# Patient Record
Sex: MALE | Race: WHITE | Employment: OTHER | URBAN - METROPOLITAN AREA
[De-identification: names, ages, dates, MRNs, and addresses within clinical notes are randomized per-mention and may not be internally consistent; named-entity substitution may affect disease eponyms.]

---

## 2017-07-31 ENCOUNTER — HOSPITAL ENCOUNTER (OUTPATIENT)
Dept: ULTRASOUND IMAGING | Age: 47
Discharge: HOME | End: 2017-07-31
Attending: NURSE PRACTITIONER | Admitting: NURSE PRACTITIONER

## 2017-07-31 DIAGNOSIS — R74.01 NONSPECIFIC ELEVATION OF LEVELS OF TRANSAMINASE AND LACTIC ACID DEHYDROGENASE (LDH): ICD-10-CM

## 2017-07-31 DIAGNOSIS — R74.02 NONSPECIFIC ELEVATION OF LEVELS OF TRANSAMINASE AND LACTIC ACID DEHYDROGENASE (LDH): ICD-10-CM

## 2017-07-31 DIAGNOSIS — R74.01 ELEVATED ALT MEASUREMENT: ICD-10-CM

## 2018-03-20 PROBLEM — R07.9 CHEST PAIN: Status: ACTIVE | Noted: 2018-03-20

## 2018-03-23 RX ORDER — CLOPIDOGREL BISULFATE 75 MG/1
75 TABLET ORAL DAILY
Qty: 30 TABLET | Refills: 3 | Status: ON HOLD | OUTPATIENT
Start: 2018-03-23 | End: 2018-08-15 | Stop reason: HOSPADM

## 2018-03-23 RX ORDER — ATORVASTATIN CALCIUM 80 MG/1
80 TABLET, FILM COATED ORAL NIGHTLY
Qty: 30 TABLET | Refills: 3 | Status: SHIPPED | OUTPATIENT
Start: 2018-03-23 | End: 2018-07-18 | Stop reason: SDUPTHER

## 2018-03-23 RX ORDER — ASPIRIN 81 MG/1
81 TABLET ORAL DAILY
Qty: 30 TABLET | Refills: 3 | Status: ON HOLD | OUTPATIENT
Start: 2018-03-23 | End: 2018-10-08

## 2018-03-23 RX ORDER — NITROGLYCERIN 0.4 MG/1
TABLET SUBLINGUAL
Qty: 25 TABLET | Refills: 3 | Status: ON HOLD | OUTPATIENT
Start: 2018-03-23 | End: 2018-08-24 | Stop reason: HOSPADM

## 2018-03-23 RX ORDER — METOPROLOL SUCCINATE 25 MG/1
12.5 TABLET, EXTENDED RELEASE ORAL DAILY
Qty: 30 TABLET | Refills: 3 | Status: SHIPPED | OUTPATIENT
Start: 2018-03-23 | End: 2018-06-18

## 2018-04-02 ENCOUNTER — TELEPHONE (OUTPATIENT)
Dept: CARDIOLOGY CLINIC | Age: 48
End: 2018-04-02

## 2018-04-13 ENCOUNTER — OFFICE VISIT (OUTPATIENT)
Dept: CARDIOLOGY CLINIC | Age: 48
End: 2018-04-13

## 2018-04-13 VITALS
WEIGHT: 224 LBS | OXYGEN SATURATION: 96 % | HEART RATE: 72 BPM | BODY MASS INDEX: 30.38 KG/M2 | DIASTOLIC BLOOD PRESSURE: 76 MMHG | SYSTOLIC BLOOD PRESSURE: 120 MMHG

## 2018-04-13 DIAGNOSIS — I10 ESSENTIAL HYPERTENSION: ICD-10-CM

## 2018-04-13 DIAGNOSIS — I25.118 CORONARY ARTERY DISEASE OF NATIVE ARTERY OF NATIVE HEART WITH STABLE ANGINA PECTORIS (HCC): ICD-10-CM

## 2018-04-13 DIAGNOSIS — E78.2 MIXED HYPERLIPIDEMIA: ICD-10-CM

## 2018-04-13 DIAGNOSIS — I25.10 CHRONIC TOTAL OCCLUSION OF NATIVE CORONARY ARTERY: ICD-10-CM

## 2018-04-13 DIAGNOSIS — I25.82 CHRONIC TOTAL OCCLUSION OF NATIVE CORONARY ARTERY: ICD-10-CM

## 2018-04-13 DIAGNOSIS — R93.1 ABNORMAL NUCLEAR CARDIAC IMAGING TEST: ICD-10-CM

## 2018-04-13 PROCEDURE — G8417 CALC BMI ABV UP PARAM F/U: HCPCS | Performed by: INTERNAL MEDICINE

## 2018-04-13 PROCEDURE — G8427 DOCREV CUR MEDS BY ELIG CLIN: HCPCS | Performed by: INTERNAL MEDICINE

## 2018-04-13 PROCEDURE — 99215 OFFICE O/P EST HI 40 MIN: CPT | Performed by: INTERNAL MEDICINE

## 2018-04-13 RX ORDER — ISOSORBIDE MONONITRATE 30 MG/1
30 TABLET, EXTENDED RELEASE ORAL DAILY
Qty: 30 TABLET | Refills: 3 | Status: ON HOLD | OUTPATIENT
Start: 2018-04-13 | End: 2018-06-11 | Stop reason: HOSPADM

## 2018-04-16 ENCOUNTER — TELEPHONE (OUTPATIENT)
Dept: CARDIOLOGY CLINIC | Age: 48
End: 2018-04-16

## 2018-04-26 ENCOUNTER — TELEPHONE (OUTPATIENT)
Dept: CARDIOLOGY CLINIC | Age: 48
End: 2018-04-26

## 2018-05-02 ENCOUNTER — TELEPHONE (OUTPATIENT)
Dept: CARDIOLOGY CLINIC | Age: 48
End: 2018-05-02

## 2018-05-30 ENCOUNTER — TELEPHONE (OUTPATIENT)
Dept: CARDIOLOGY CLINIC | Age: 48
End: 2018-05-30

## 2018-06-28 ENCOUNTER — TELEPHONE (OUTPATIENT)
Dept: CARDIOLOGY CLINIC | Age: 48
End: 2018-06-28

## 2018-06-28 ENCOUNTER — OFFICE VISIT (OUTPATIENT)
Dept: CARDIOLOGY CLINIC | Age: 48
End: 2018-06-28

## 2018-06-28 ENCOUNTER — HOSPITAL ENCOUNTER (OUTPATIENT)
Dept: OTHER | Age: 48
Discharge: HOME | End: 2018-06-28
Attending: INTERNAL MEDICINE | Admitting: INTERNAL MEDICINE

## 2018-06-28 VITALS
HEIGHT: 72 IN | HEART RATE: 84 BPM | SYSTOLIC BLOOD PRESSURE: 108 MMHG | DIASTOLIC BLOOD PRESSURE: 60 MMHG | OXYGEN SATURATION: 96 % | WEIGHT: 206 LBS | BODY MASS INDEX: 27.9 KG/M2

## 2018-06-28 DIAGNOSIS — I25.82 CHRONIC TOTAL OCCLUSION OF NATIVE CORONARY ARTERY: ICD-10-CM

## 2018-06-28 DIAGNOSIS — R06.09 DOE (DYSPNEA ON EXERTION): ICD-10-CM

## 2018-06-28 DIAGNOSIS — E78.2 MIXED HYPERLIPIDEMIA: ICD-10-CM

## 2018-06-28 DIAGNOSIS — I10 ESSENTIAL HYPERTENSION: ICD-10-CM

## 2018-06-28 DIAGNOSIS — I25.10 CHRONIC TOTAL OCCLUSION OF NATIVE CORONARY ARTERY: ICD-10-CM

## 2018-06-28 DIAGNOSIS — I20.9 ANGINA, CLASS IV (HCC): Primary | ICD-10-CM

## 2018-06-28 DIAGNOSIS — I25.118 CORONARY ARTERY DISEASE OF NATIVE ARTERY OF NATIVE HEART WITH STABLE ANGINA PECTORIS (HCC): ICD-10-CM

## 2018-06-28 LAB — PRO-BNP: 52 PG/ML (ref 0–124)

## 2018-06-28 PROCEDURE — 4004F PT TOBACCO SCREEN RCVD TLK: CPT | Performed by: INTERNAL MEDICINE

## 2018-06-28 PROCEDURE — G8598 ASA/ANTIPLAT THER USED: HCPCS | Performed by: INTERNAL MEDICINE

## 2018-06-28 PROCEDURE — G8417 CALC BMI ABV UP PARAM F/U: HCPCS | Performed by: INTERNAL MEDICINE

## 2018-06-28 PROCEDURE — G8427 DOCREV CUR MEDS BY ELIG CLIN: HCPCS | Performed by: INTERNAL MEDICINE

## 2018-06-28 PROCEDURE — 1111F DSCHRG MED/CURRENT MED MERGE: CPT | Performed by: INTERNAL MEDICINE

## 2018-06-28 PROCEDURE — 99214 OFFICE O/P EST MOD 30 MIN: CPT | Performed by: INTERNAL MEDICINE

## 2018-06-28 RX ORDER — RANOLAZINE 1000 MG/1
1000 TABLET, EXTENDED RELEASE ORAL 2 TIMES DAILY
Qty: 60 TABLET | Refills: 6 | Status: ON HOLD | OUTPATIENT
Start: 2018-06-28 | End: 2018-08-24 | Stop reason: HOSPADM

## 2018-07-03 ENCOUNTER — TELEPHONE (OUTPATIENT)
Dept: CARDIOLOGY CLINIC | Age: 48
End: 2018-07-03

## 2018-07-18 RX ORDER — ATORVASTATIN CALCIUM 80 MG/1
80 TABLET, FILM COATED ORAL NIGHTLY
Qty: 90 TABLET | Refills: 3 | Status: ON HOLD | OUTPATIENT
Start: 2018-07-18 | End: 2018-08-24 | Stop reason: HOSPADM

## 2018-07-23 ENCOUNTER — APPOINTMENT (OUTPATIENT)
Dept: CT IMAGING | Age: 48
DRG: 682 | End: 2018-07-23
Payer: MEDICARE

## 2018-07-23 ENCOUNTER — APPOINTMENT (OUTPATIENT)
Dept: GENERAL RADIOLOGY | Age: 48
DRG: 682 | End: 2018-07-23
Payer: MEDICARE

## 2018-07-23 ENCOUNTER — HOSPITAL ENCOUNTER (INPATIENT)
Age: 48
LOS: 5 days | Discharge: NURSING, REHAB OR LONG-TERM CARE | DRG: 682 | End: 2018-07-28
Attending: EMERGENCY MEDICINE | Admitting: INTERNAL MEDICINE
Payer: MEDICARE

## 2018-07-23 DIAGNOSIS — R11.2 NAUSEA AND VOMITING, INTRACTABILITY OF VOMITING NOT SPECIFIED, UNSPECIFIED VOMITING TYPE: ICD-10-CM

## 2018-07-23 DIAGNOSIS — N17.9 ACUTE RENAL FAILURE, UNSPECIFIED ACUTE RENAL FAILURE TYPE (HCC): Primary | ICD-10-CM

## 2018-07-23 DIAGNOSIS — R80.9 PROTEINURIA, UNSPECIFIED TYPE: ICD-10-CM

## 2018-07-23 DIAGNOSIS — D72.829 LEUKOCYTOSIS, UNSPECIFIED TYPE: ICD-10-CM

## 2018-07-23 PROBLEM — E44.0 MODERATE PROTEIN-CALORIE MALNUTRITION (HCC): Status: ACTIVE | Noted: 2018-07-23

## 2018-07-23 LAB
A/G RATIO: 1.4 (ref 1.1–2.2)
A/G RATIO: 1.6 (ref 1.1–2.2)
ACETAMINOPHEN LEVEL: <5 UG/ML (ref 10–30)
ALBUMIN SERPL-MCNC: 3.6 G/DL (ref 3.4–5)
ALBUMIN SERPL-MCNC: 3.6 G/DL (ref 3.4–5)
ALBUMIN SERPL-MCNC: 3.7 G/DL (ref 3.4–5)
ALBUMIN SERPL-MCNC: 4.4 G/DL (ref 3.4–5)
ALP BLD-CCNC: 82 U/L (ref 40–129)
ALP BLD-CCNC: 90 U/L (ref 40–129)
ALT SERPL-CCNC: 13 U/L (ref 10–40)
ALT SERPL-CCNC: 15 U/L (ref 10–40)
AMORPHOUS: ABNORMAL /HPF
ANION GAP SERPL CALCULATED.3IONS-SCNC: 11 MMOL/L (ref 3–16)
ANION GAP SERPL CALCULATED.3IONS-SCNC: 13 MMOL/L (ref 3–16)
ANION GAP SERPL CALCULATED.3IONS-SCNC: 13 MMOL/L (ref 3–16)
ANION GAP SERPL CALCULATED.3IONS-SCNC: 14 MMOL/L (ref 3–16)
AST SERPL-CCNC: 21 U/L (ref 15–37)
AST SERPL-CCNC: 24 U/L (ref 15–37)
BACTERIA: ABNORMAL /HPF
BASOPHILS ABSOLUTE: 0 K/UL (ref 0–0.2)
BASOPHILS ABSOLUTE: 0 K/UL (ref 0–0.2)
BASOPHILS RELATIVE PERCENT: 0.2 %
BASOPHILS RELATIVE PERCENT: 0.3 %
BILIRUB SERPL-MCNC: 0.6 MG/DL (ref 0–1)
BILIRUB SERPL-MCNC: 0.7 MG/DL (ref 0–1)
BILIRUBIN URINE: ABNORMAL
BLOOD, URINE: NEGATIVE
BUN BLDV-MCNC: 13 MG/DL (ref 7–20)
BUN BLDV-MCNC: 50 MG/DL (ref 7–20)
BUN BLDV-MCNC: 51 MG/DL (ref 7–20)
BUN BLDV-MCNC: 52 MG/DL (ref 7–20)
CALCIUM SERPL-MCNC: 10.6 MG/DL (ref 8.3–10.6)
CALCIUM SERPL-MCNC: 8.3 MG/DL (ref 8.3–10.6)
CALCIUM SERPL-MCNC: 9.2 MG/DL (ref 8.3–10.6)
CALCIUM SERPL-MCNC: 9.9 MG/DL (ref 8.3–10.6)
CASTS: ABNORMAL /LPF
CHLORIDE BLD-SCNC: 101 MMOL/L (ref 99–110)
CHLORIDE BLD-SCNC: 104 MMOL/L (ref 99–110)
CHLORIDE BLD-SCNC: 97 MMOL/L (ref 99–110)
CHLORIDE BLD-SCNC: 99 MMOL/L (ref 99–110)
CLARITY: CLEAR
CO2: 19 MMOL/L (ref 21–32)
CO2: 21 MMOL/L (ref 21–32)
CO2: 22 MMOL/L (ref 21–32)
CO2: 23 MMOL/L (ref 21–32)
COLOR: ABNORMAL
CREAT SERPL-MCNC: 1.2 MG/DL (ref 0.9–1.3)
CREAT SERPL-MCNC: 3.8 MG/DL (ref 0.9–1.3)
CREAT SERPL-MCNC: 3.8 MG/DL (ref 0.9–1.3)
CREAT SERPL-MCNC: 3.9 MG/DL (ref 0.9–1.3)
EKG ATRIAL RATE: 62 BPM
EKG ATRIAL RATE: 70 BPM
EKG DIAGNOSIS: NORMAL
EKG DIAGNOSIS: NORMAL
EKG P AXIS: 58 DEGREES
EKG P AXIS: 61 DEGREES
EKG P-R INTERVAL: 164 MS
EKG P-R INTERVAL: 170 MS
EKG Q-T INTERVAL: 434 MS
EKG Q-T INTERVAL: 448 MS
EKG QRS DURATION: 108 MS
EKG QRS DURATION: 112 MS
EKG QTC CALCULATION (BAZETT): 440 MS
EKG QTC CALCULATION (BAZETT): 483 MS
EKG R AXIS: 44 DEGREES
EKG R AXIS: 58 DEGREES
EKG T AXIS: 62 DEGREES
EKG T AXIS: 67 DEGREES
EKG VENTRICULAR RATE: 62 BPM
EKG VENTRICULAR RATE: 70 BPM
EOSINOPHILS ABSOLUTE: 0.3 K/UL (ref 0–0.6)
EOSINOPHILS ABSOLUTE: 0.3 K/UL (ref 0–0.6)
EOSINOPHILS RELATIVE PERCENT: 1.9 %
EOSINOPHILS RELATIVE PERCENT: 2.1 %
EPITHELIAL CELLS, UA: ABNORMAL /HPF
ESTIMATED AVERAGE GLUCOSE: 99.7 MG/DL
GFR AFRICAN AMERICAN: 20
GFR AFRICAN AMERICAN: 21
GFR AFRICAN AMERICAN: 21
GFR AFRICAN AMERICAN: >60
GFR NON-AFRICAN AMERICAN: 17
GFR NON-AFRICAN AMERICAN: >60
GLOBULIN: 2.6 G/DL
GLOBULIN: 2.8 G/DL
GLUCOSE BLD-MCNC: 101 MG/DL (ref 70–99)
GLUCOSE BLD-MCNC: 103 MG/DL (ref 70–99)
GLUCOSE BLD-MCNC: 106 MG/DL (ref 70–99)
GLUCOSE BLD-MCNC: 75 MG/DL (ref 70–99)
GLUCOSE BLD-MCNC: 82 MG/DL (ref 70–99)
GLUCOSE BLD-MCNC: 84 MG/DL (ref 70–99)
GLUCOSE BLD-MCNC: 88 MG/DL (ref 70–99)
GLUCOSE BLD-MCNC: 93 MG/DL (ref 70–99)
GLUCOSE BLD-MCNC: 93 MG/DL (ref 70–99)
GLUCOSE BLD-MCNC: 95 MG/DL (ref 70–99)
GLUCOSE URINE: NEGATIVE MG/DL
HBA1C MFR BLD: 5.1 %
HBV SURFACE AB TITR SER: <3.5 MIU/ML
HCT VFR BLD CALC: 36.9 % (ref 40.5–52.5)
HCT VFR BLD CALC: 39.9 % (ref 40.5–52.5)
HEMOGLOBIN: 12.6 G/DL (ref 13.5–17.5)
HEMOGLOBIN: 13.7 G/DL (ref 13.5–17.5)
HEPATITIS B SURFACE ANTIGEN INTERPRETATION: NORMAL
INR BLD: 1.16 (ref 0.86–1.14)
KETONES, URINE: NEGATIVE MG/DL
LEUKOCYTE ESTERASE, URINE: NEGATIVE
LIPASE: 65 U/L (ref 13–60)
LITHIUM DOSE AMOUNT: ABNORMAL
LITHIUM DOSE AMOUNT: NORMAL
LITHIUM DOSE AMOUNT: NORMAL
LITHIUM LEVEL: 0.9 MMOL/L (ref 0.6–1.2)
LITHIUM LEVEL: 1.1 MMOL/L (ref 0.6–1.2)
LITHIUM LEVEL: 2.4 MMOL/L (ref 0.6–1.2)
LITHIUM LEVEL: 2.5 MMOL/L (ref 0.6–1.2)
LITHIUM LEVEL: 2.8 MMOL/L (ref 0.6–1.2)
LITHIUM LEVEL: 3 MMOL/L (ref 0.6–1.2)
LYMPHOCYTES ABSOLUTE: 2 K/UL (ref 1–5.1)
LYMPHOCYTES ABSOLUTE: 2.2 K/UL (ref 1–5.1)
LYMPHOCYTES RELATIVE PERCENT: 14.2 %
LYMPHOCYTES RELATIVE PERCENT: 15.8 %
MCH RBC QN AUTO: 28.9 PG (ref 26–34)
MCH RBC QN AUTO: 29.2 PG (ref 26–34)
MCHC RBC AUTO-ENTMCNC: 34.1 G/DL (ref 31–36)
MCHC RBC AUTO-ENTMCNC: 34.4 G/DL (ref 31–36)
MCV RBC AUTO: 84.9 FL (ref 80–100)
MCV RBC AUTO: 85 FL (ref 80–100)
MICROSCOPIC EXAMINATION: YES
MONOCYTES ABSOLUTE: 0.9 K/UL (ref 0–1.3)
MONOCYTES ABSOLUTE: 1 K/UL (ref 0–1.3)
MONOCYTES RELATIVE PERCENT: 6.4 %
MONOCYTES RELATIVE PERCENT: 6.6 %
NEUTROPHILS ABSOLUTE: 11.8 K/UL (ref 1.7–7.7)
NEUTROPHILS ABSOLUTE: 9.7 K/UL (ref 1.7–7.7)
NEUTROPHILS RELATIVE PERCENT: 75.2 %
NEUTROPHILS RELATIVE PERCENT: 77.3 %
NITRITE, URINE: NEGATIVE
PDW BLD-RTO: 13.4 % (ref 12.4–15.4)
PDW BLD-RTO: 13.7 % (ref 12.4–15.4)
PERFORMED ON: ABNORMAL
PERFORMED ON: NORMAL
PH UA: 5.5
PHOSPHORUS: 2 MG/DL (ref 2.5–4.9)
PHOSPHORUS: 4.7 MG/DL (ref 2.5–4.9)
PLATELET # BLD: 226 K/UL (ref 135–450)
PLATELET # BLD: 251 K/UL (ref 135–450)
PMV BLD AUTO: 8.9 FL (ref 5–10.5)
PMV BLD AUTO: 9.6 FL (ref 5–10.5)
POTASSIUM REFLEX MAGNESIUM: 4.9 MMOL/L (ref 3.5–5.1)
POTASSIUM REFLEX MAGNESIUM: 5 MMOL/L (ref 3.5–5.1)
POTASSIUM SERPL-SCNC: 3.6 MMOL/L (ref 3.5–5.1)
POTASSIUM SERPL-SCNC: 4.4 MMOL/L (ref 3.5–5.1)
PROTEIN UA: 30 MG/DL
PROTHROMBIN TIME: 13.2 SEC (ref 9.8–13)
RBC # BLD: 4.35 M/UL (ref 4.2–5.9)
RBC # BLD: 4.7 M/UL (ref 4.2–5.9)
RBC UA: ABNORMAL /HPF (ref 0–2)
SALICYLATE, SERUM: 0.9 MG/DL (ref 15–30)
SODIUM BLD-SCNC: 131 MMOL/L (ref 136–145)
SODIUM BLD-SCNC: 134 MMOL/L (ref 136–145)
SODIUM BLD-SCNC: 135 MMOL/L (ref 136–145)
SODIUM BLD-SCNC: 137 MMOL/L (ref 136–145)
SPECIFIC GRAVITY UA: >=1.03
TOTAL PROTEIN: 6.3 G/DL (ref 6.4–8.2)
TOTAL PROTEIN: 7.2 G/DL (ref 6.4–8.2)
TROPONIN: <0.01 NG/ML
TSH REFLEX: 0.33 UIU/ML (ref 0.27–4.2)
URINE TYPE: ABNORMAL
UROBILINOGEN, URINE: 0.2 E.U./DL
WBC # BLD: 12.9 K/UL (ref 4–11)
WBC # BLD: 15.3 K/UL (ref 4–11)
WBC UA: ABNORMAL /HPF (ref 0–5)

## 2018-07-23 PROCEDURE — S0028 INJECTION, FAMOTIDINE, 20 MG: HCPCS | Performed by: EMERGENCY MEDICINE

## 2018-07-23 PROCEDURE — 85610 PROTHROMBIN TIME: CPT

## 2018-07-23 PROCEDURE — 86704 HEP B CORE ANTIBODY TOTAL: CPT

## 2018-07-23 PROCEDURE — 2580000003 HC RX 258: Performed by: INTERNAL MEDICINE

## 2018-07-23 PROCEDURE — 6370000000 HC RX 637 (ALT 250 FOR IP): Performed by: INTERNAL MEDICINE

## 2018-07-23 PROCEDURE — 2500000003 HC RX 250 WO HCPCS: Performed by: EMERGENCY MEDICINE

## 2018-07-23 PROCEDURE — 93010 ELECTROCARDIOGRAM REPORT: CPT | Performed by: INTERNAL MEDICINE

## 2018-07-23 PROCEDURE — 51702 INSERT TEMP BLADDER CATH: CPT

## 2018-07-23 PROCEDURE — 36415 COLL VENOUS BLD VENIPUNCTURE: CPT

## 2018-07-23 PROCEDURE — 2580000003 HC RX 258: Performed by: EMERGENCY MEDICINE

## 2018-07-23 PROCEDURE — 51798 US URINE CAPACITY MEASURE: CPT

## 2018-07-23 PROCEDURE — 2000000000 HC ICU R&B

## 2018-07-23 PROCEDURE — 36556 INSERT NON-TUNNEL CV CATH: CPT

## 2018-07-23 PROCEDURE — 05HM33Z INSERTION OF INFUSION DEVICE INTO RIGHT INTERNAL JUGULAR VEIN, PERCUTANEOUS APPROACH: ICD-10-PCS | Performed by: RADIOLOGY

## 2018-07-23 PROCEDURE — 99285 EMERGENCY DEPT VISIT HI MDM: CPT

## 2018-07-23 PROCEDURE — G0480 DRUG TEST DEF 1-7 CLASSES: HCPCS

## 2018-07-23 PROCEDURE — 80053 COMPREHEN METABOLIC PANEL: CPT

## 2018-07-23 PROCEDURE — 93005 ELECTROCARDIOGRAM TRACING: CPT | Performed by: EMERGENCY MEDICINE

## 2018-07-23 PROCEDURE — 76937 US GUIDE VASCULAR ACCESS: CPT

## 2018-07-23 PROCEDURE — 96374 THER/PROPH/DIAG INJ IV PUSH: CPT

## 2018-07-23 PROCEDURE — 80178 ASSAY OF LITHIUM: CPT

## 2018-07-23 PROCEDURE — C9113 INJ PANTOPRAZOLE SODIUM, VIA: HCPCS | Performed by: INTERNAL MEDICINE

## 2018-07-23 PROCEDURE — 5A1D70Z PERFORMANCE OF URINARY FILTRATION, INTERMITTENT, LESS THAN 6 HOURS PER DAY: ICD-10-PCS | Performed by: INTERNAL MEDICINE

## 2018-07-23 PROCEDURE — 71045 X-RAY EXAM CHEST 1 VIEW: CPT

## 2018-07-23 PROCEDURE — 99222 1ST HOSP IP/OBS MODERATE 55: CPT | Performed by: INTERNAL MEDICINE

## 2018-07-23 PROCEDURE — C1752 CATH,HEMODIALYSIS,SHORT-TERM: HCPCS

## 2018-07-23 PROCEDURE — 96361 HYDRATE IV INFUSION ADD-ON: CPT

## 2018-07-23 PROCEDURE — 87340 HEPATITIS B SURFACE AG IA: CPT

## 2018-07-23 PROCEDURE — 85025 COMPLETE CBC W/AUTO DIFF WBC: CPT

## 2018-07-23 PROCEDURE — 83690 ASSAY OF LIPASE: CPT

## 2018-07-23 PROCEDURE — 2500000003 HC RX 250 WO HCPCS: Performed by: INTERNAL MEDICINE

## 2018-07-23 PROCEDURE — 6360000002 HC RX W HCPCS: Performed by: INTERNAL MEDICINE

## 2018-07-23 PROCEDURE — 81001 URINALYSIS AUTO W/SCOPE: CPT

## 2018-07-23 PROCEDURE — 74176 CT ABD & PELVIS W/O CONTRAST: CPT

## 2018-07-23 PROCEDURE — 86706 HEP B SURFACE ANTIBODY: CPT

## 2018-07-23 PROCEDURE — 83036 HEMOGLOBIN GLYCOSYLATED A1C: CPT

## 2018-07-23 PROCEDURE — 77001 FLUOROGUIDE FOR VEIN DEVICE: CPT

## 2018-07-23 PROCEDURE — 84443 ASSAY THYROID STIM HORMONE: CPT

## 2018-07-23 PROCEDURE — 96375 TX/PRO/DX INJ NEW DRUG ADDON: CPT

## 2018-07-23 PROCEDURE — 6370000000 HC RX 637 (ALT 250 FOR IP): Performed by: NURSE PRACTITIONER

## 2018-07-23 PROCEDURE — 99223 1ST HOSP IP/OBS HIGH 75: CPT | Performed by: NURSE PRACTITIONER

## 2018-07-23 PROCEDURE — 84484 ASSAY OF TROPONIN QUANT: CPT

## 2018-07-23 PROCEDURE — 90937 HEMODIALYSIS REPEATED EVAL: CPT

## 2018-07-23 PROCEDURE — 93005 ELECTROCARDIOGRAM TRACING: CPT | Performed by: INTERNAL MEDICINE

## 2018-07-23 PROCEDURE — 6360000002 HC RX W HCPCS: Performed by: EMERGENCY MEDICINE

## 2018-07-23 RX ORDER — HEPARIN SODIUM 5000 [USP'U]/ML
5000 INJECTION, SOLUTION INTRAVENOUS; SUBCUTANEOUS EVERY 8 HOURS SCHEDULED
Status: DISCONTINUED | OUTPATIENT
Start: 2018-07-23 | End: 2018-07-24

## 2018-07-23 RX ORDER — NITROGLYCERIN 0.4 MG/1
0.4 TABLET SUBLINGUAL EVERY 5 MIN PRN
Status: DISCONTINUED | OUTPATIENT
Start: 2018-07-23 | End: 2018-07-28 | Stop reason: HOSPADM

## 2018-07-23 RX ORDER — NICOTINE POLACRILEX 4 MG
15 LOZENGE BUCCAL PRN
Status: DISCONTINUED | OUTPATIENT
Start: 2018-07-23 | End: 2018-07-28 | Stop reason: HOSPADM

## 2018-07-23 RX ORDER — CLOPIDOGREL BISULFATE 75 MG/1
75 TABLET ORAL DAILY
Status: DISCONTINUED | OUTPATIENT
Start: 2018-07-23 | End: 2018-07-24

## 2018-07-23 RX ORDER — ONDANSETRON 2 MG/ML
4 INJECTION INTRAMUSCULAR; INTRAVENOUS EVERY 6 HOURS PRN
Status: DISCONTINUED | OUTPATIENT
Start: 2018-07-23 | End: 2018-07-23

## 2018-07-23 RX ORDER — LEVOTHYROXINE SODIUM ANHYDROUS 100 UG/5ML
75 INJECTION, POWDER, LYOPHILIZED, FOR SOLUTION INTRAVENOUS
Status: COMPLETED | OUTPATIENT
Start: 2018-07-23 | End: 2018-07-23

## 2018-07-23 RX ORDER — ONDANSETRON 2 MG/ML
4 INJECTION INTRAMUSCULAR; INTRAVENOUS EVERY 30 MIN PRN
Status: DISCONTINUED | OUTPATIENT
Start: 2018-07-23 | End: 2018-07-23

## 2018-07-23 RX ORDER — SODIUM CHLORIDE 0.9 % (FLUSH) 0.9 %
10 SYRINGE (ML) INJECTION EVERY 12 HOURS SCHEDULED
Status: DISCONTINUED | OUTPATIENT
Start: 2018-07-23 | End: 2018-07-28 | Stop reason: HOSPADM

## 2018-07-23 RX ORDER — HYDRALAZINE HYDROCHLORIDE 20 MG/ML
10 INJECTION INTRAMUSCULAR; INTRAVENOUS EVERY 6 HOURS PRN
Status: DISCONTINUED | OUTPATIENT
Start: 2018-07-23 | End: 2018-07-28 | Stop reason: HOSPADM

## 2018-07-23 RX ORDER — VITAMIN B COMPLEX
1 CAPSULE ORAL 2 TIMES DAILY
COMMUNITY

## 2018-07-23 RX ORDER — METOPROLOL SUCCINATE 25 MG/1
25 TABLET, EXTENDED RELEASE ORAL DAILY
Status: DISCONTINUED | OUTPATIENT
Start: 2018-07-23 | End: 2018-07-28 | Stop reason: HOSPADM

## 2018-07-23 RX ORDER — LEVOTHYROXINE SODIUM 0.15 MG/1
150 TABLET ORAL DAILY
Status: DISCONTINUED | OUTPATIENT
Start: 2018-07-24 | End: 2018-07-28 | Stop reason: HOSPADM

## 2018-07-23 RX ORDER — PANTOPRAZOLE SODIUM 40 MG/10ML
40 INJECTION, POWDER, LYOPHILIZED, FOR SOLUTION INTRAVENOUS DAILY
Status: DISCONTINUED | OUTPATIENT
Start: 2018-07-23 | End: 2018-07-28 | Stop reason: HOSPADM

## 2018-07-23 RX ORDER — GLUCAGON 1 MG/ML
1 KIT INJECTION PRN
Status: DISCONTINUED | OUTPATIENT
Start: 2018-07-23 | End: 2018-07-28 | Stop reason: HOSPADM

## 2018-07-23 RX ORDER — ALOGLIPTIN AND METFORMIN HYDROCHLORIDE 12.5; 1 MG/1; MG/1
1 TABLET, FILM COATED ORAL 2 TIMES DAILY
COMMUNITY

## 2018-07-23 RX ORDER — 0.9 % SODIUM CHLORIDE 0.9 %
500 INTRAVENOUS SOLUTION INTRAVENOUS ONCE
Status: COMPLETED | OUTPATIENT
Start: 2018-07-23 | End: 2018-07-23

## 2018-07-23 RX ORDER — SODIUM CHLORIDE 0.9 % (FLUSH) 0.9 %
10 SYRINGE (ML) INJECTION PRN
Status: DISCONTINUED | OUTPATIENT
Start: 2018-07-23 | End: 2018-07-28 | Stop reason: HOSPADM

## 2018-07-23 RX ORDER — LAMOTRIGINE 25 MG/1
25 TABLET ORAL DAILY
Status: DISCONTINUED | OUTPATIENT
Start: 2018-07-23 | End: 2018-07-28 | Stop reason: HOSPADM

## 2018-07-23 RX ORDER — ATORVASTATIN CALCIUM 40 MG/1
80 TABLET, FILM COATED ORAL NIGHTLY
Status: DISCONTINUED | OUTPATIENT
Start: 2018-07-23 | End: 2018-07-28 | Stop reason: HOSPADM

## 2018-07-23 RX ORDER — DEXTROSE MONOHYDRATE 50 MG/ML
100 INJECTION, SOLUTION INTRAVENOUS PRN
Status: DISCONTINUED | OUTPATIENT
Start: 2018-07-23 | End: 2018-07-28 | Stop reason: HOSPADM

## 2018-07-23 RX ORDER — ASPIRIN 81 MG/1
81 TABLET ORAL DAILY
Status: DISCONTINUED | OUTPATIENT
Start: 2018-07-23 | End: 2018-07-24

## 2018-07-23 RX ORDER — LEVOTHYROXINE SODIUM 0.15 MG/1
150 TABLET ORAL NIGHTLY
COMMUNITY

## 2018-07-23 RX ORDER — INSULIN GLARGINE 100 [IU]/ML
14 INJECTION, SOLUTION SUBCUTANEOUS NIGHTLY
Status: DISCONTINUED | OUTPATIENT
Start: 2018-07-23 | End: 2018-07-27

## 2018-07-23 RX ORDER — FOLIC ACID 1 MG/1
1 TABLET ORAL DAILY
Status: DISCONTINUED | OUTPATIENT
Start: 2018-07-23 | End: 2018-07-28 | Stop reason: HOSPADM

## 2018-07-23 RX ORDER — HEPARIN SODIUM 1000 [USP'U]/ML
2600 INJECTION, SOLUTION INTRAVENOUS; SUBCUTANEOUS PRN
Status: DISCONTINUED | OUTPATIENT
Start: 2018-07-23 | End: 2018-07-28 | Stop reason: HOSPADM

## 2018-07-23 RX ORDER — 0.9 % SODIUM CHLORIDE 0.9 %
1000 INTRAVENOUS SOLUTION INTRAVENOUS ONCE
Status: COMPLETED | OUTPATIENT
Start: 2018-07-23 | End: 2018-07-23

## 2018-07-23 RX ORDER — DEXTROSE MONOHYDRATE 25 G/50ML
12.5 INJECTION, SOLUTION INTRAVENOUS PRN
Status: DISCONTINUED | OUTPATIENT
Start: 2018-07-23 | End: 2018-07-28 | Stop reason: HOSPADM

## 2018-07-23 RX ORDER — SODIUM CHLORIDE 9 MG/ML
INJECTION, SOLUTION INTRAVENOUS CONTINUOUS
Status: DISCONTINUED | OUTPATIENT
Start: 2018-07-23 | End: 2018-07-26

## 2018-07-23 RX ORDER — RANOLAZINE 500 MG/1
1000 TABLET, EXTENDED RELEASE ORAL 2 TIMES DAILY
Status: DISCONTINUED | OUTPATIENT
Start: 2018-07-23 | End: 2018-07-23

## 2018-07-23 RX ADMIN — FOLIC ACID 1 MG: 1 TABLET ORAL at 09:20

## 2018-07-23 RX ADMIN — HEPARIN SODIUM 2600 UNITS: 1000 INJECTION, SOLUTION INTRAVENOUS; SUBCUTANEOUS at 21:45

## 2018-07-23 RX ADMIN — FAMOTIDINE 20 MG: 10 INJECTION INTRAVENOUS at 01:20

## 2018-07-23 RX ADMIN — CLOPIDOGREL BISULFATE 75 MG: 75 TABLET ORAL at 09:20

## 2018-07-23 RX ADMIN — LEVOTHYROXINE SODIUM ANHYDROUS 75 MCG: 100 INJECTION, POWDER, LYOPHILIZED, FOR SOLUTION INTRAVENOUS at 06:15

## 2018-07-23 RX ADMIN — SODIUM CHLORIDE: 9 INJECTION, SOLUTION INTRAVENOUS at 14:53

## 2018-07-23 RX ADMIN — SODIUM CHLORIDE 1000 ML: 9 INJECTION, SOLUTION INTRAVENOUS at 05:08

## 2018-07-23 RX ADMIN — MUPIROCIN: 20 OINTMENT TOPICAL at 09:20

## 2018-07-23 RX ADMIN — SODIUM CHLORIDE: 9 INJECTION, SOLUTION INTRAVENOUS at 22:22

## 2018-07-23 RX ADMIN — SODIUM CHLORIDE 500 ML: 9 INJECTION, SOLUTION INTRAVENOUS at 01:20

## 2018-07-23 RX ADMIN — MUPIROCIN: 20 OINTMENT TOPICAL at 22:13

## 2018-07-23 RX ADMIN — SODIUM CHLORIDE: 9 INJECTION, SOLUTION INTRAVENOUS at 01:21

## 2018-07-23 RX ADMIN — SODIUM CHLORIDE: 9 INJECTION, SOLUTION INTRAVENOUS at 06:15

## 2018-07-23 RX ADMIN — ATORVASTATIN CALCIUM 80 MG: 40 TABLET, FILM COATED ORAL at 22:13

## 2018-07-23 RX ADMIN — METOPROLOL SUCCINATE 25 MG: 25 TABLET, FILM COATED, EXTENDED RELEASE ORAL at 09:20

## 2018-07-23 RX ADMIN — ONDANSETRON 4 MG: 2 INJECTION INTRAMUSCULAR; INTRAVENOUS at 01:20

## 2018-07-23 RX ADMIN — Medication 10 ML: at 09:20

## 2018-07-23 RX ADMIN — ASPIRIN 81 MG: 81 TABLET, COATED ORAL at 09:20

## 2018-07-23 RX ADMIN — LAMOTRIGINE 25 MG: 25 TABLET ORAL at 15:51

## 2018-07-23 RX ADMIN — PANTOPRAZOLE SODIUM 40 MG: 40 INJECTION, POWDER, FOR SOLUTION INTRAVENOUS at 09:20

## 2018-07-23 RX ADMIN — Medication 10 ML: at 22:13

## 2018-07-23 RX ADMIN — SODIUM CHLORIDE 500 ML: 9 INJECTION, SOLUTION INTRAVENOUS at 02:20

## 2018-07-23 ASSESSMENT — PAIN SCALES - GENERAL
PAINLEVEL_OUTOF10: 0

## 2018-07-23 NOTE — PROGRESS NOTES
Consent for non tunneled vascath placement obtained from pt girlfriend Chelsey Montenegro. Pt and pt girlfriend agreeable to line placement.

## 2018-07-23 NOTE — CONSULTS
[facility]    Consult Note           Reason for Consult: VOLODYMYR  Requesting Physician:  Dr. Ford Alba    Chief Complaint:  n/v    History of Present Illness:    53 y.o. yo male with PMH of CAD, HTN Bipolar disorder, Hashimoto thyroiditis who is admitted for volodymyr and lithium toxicity  Pt had li level of 3 which decreased to 2.8. He is drowsy and can tell me the name and his  but cannot tell me today;s date or current president  His qtc is prolonged. His uop seems to have picked up but cr is sluggish to improve; no seizure     Past Medical History:        Diagnosis Date    B[facility] affective [facility] [address]     CAD (coronary artery disease) 2018     of mLAD    Depression     Diabetes mellitus ([facility] [address]     Hashimoto disease     Hyperlipidemia     Hypertension     Schizophrenia ([facility] [address]     Sleep apnea        Past Surgical History:        Procedure Laterality Date    CARDIAC CATHETERIZATION  2018    100% occlusion of the mLAD    CORONARY ANGIOPLASTY  2018    POBA of LAD       Home Medications:    No current facility-administered medications on file prior to encounter.       Current Outpatient Prescriptions on File Prior to Encounter   Medication Sig Dispense Refill    atorvastatin (LIPITOR) 80 MG tablet Take 1 tablet by mouth nightly 90 tablet 3    ranolazine (RANEXA) 1000 MG extended release tablet Take 1 tablet by mouth 2 times daily 60 tablet 6    omeprazole (PRILOSEC) 20 MG delayed release capsule Take 1 capsule by mouth daily 30 capsule 0    famotidine (PEPCID) 20 MG tablet Take 1 tablet by mouth 2 times daily 60 tablet 0    metoclopramide (REGLAN) 10 MG tablet Take 1 tablet by mouth 4 times daily for 10 days 40 tablet 0    Cinnamon 500 MG CAPS Take 1 capsule by mouth daily      Omega-3 Fatty Acids (FISH OIL) 1360 MG CAPS Take 1 capsule by mouth daily      insulin glargine (BASAGLAR KWIKPEN) 100 UNIT/ML injection pen Inject 10 Units into the skin every morning       metoprolol succinate (TOPROL XL) 25 MG extended release tablet Take 1 tablet by mouth daily 30 tablet 3    isosorbide mononitrate (IMDUR) 60 MG extended release tablet Take 1 tablet by mouth daily 30 tablet 3    aspirin 81 MG EC tablet Take 1 tablet by mouth daily 30 tablet 3    clopidogrel (PLAVIX) 75 MG tablet Take 1 tablet by mouth daily 30 tablet 3    lithium (ESKALITH) 450 MG extended release tablet Take 450 mg by mouth 2 times daily      folic acid (FOLVITE) 1 MG tablet Take 1 mg by mouth daily      lisinopril (PRINIVIL;ZESTRIL) 20 MG tablet Take 10 mg by mouth daily       nitroGLYCERIN (NITROSTAT) 0.4 MG SL tablet up to max of 3 total doses. If no relief after 1 dose, call 911. 25 tablet 3       Allergies:  Dye [iodides]    Social History:    Social History     Social History    Marital status: Single     Spouse name: N/A    Number of children: N/A    Years of education: N/A     Occupational History    Not on file. Social History Main Topics    Smoking status: Current Some Day Smoker     Types: Cigars    Smokeless tobacco: Never Used      Comment: once a month    Alcohol use No    Drug use: No    Sexual activity: Yes     Partners: Female     Other Topics Concern    Not on file     Social History Narrative    No narrative on file       Family History:   History reviewed. No pertinent family history.     Review of Systems:   UTO     Physical exam:   Constitutional:  VITALS:  /73   Pulse 66   Temp 97.4 °F (36.3 °C) (Oral)   Resp 15   Ht 6' (1.829 m)   Wt 193 lb 9 oz (87.8 kg)   SpO2 100%   BMI 26.25 kg/m²   Gen: alert, awake, nad  Skin: no rash, turgor wnl  Heent:  eomi, mmm  Neck: no bruits or jvd noted, thyroid normal  Cardiovascular:  S1, S2 without m/r/g  Respiratory: CTA B without w/r/r; respiratory effort normal  Abdomen:  +bs, soft, nt, nd, no hepatosplenomegaly  Ext: no lower extremity edema  Neuro/Psy: AAoriented times 1; moves all 4 ext    Data/  Recent Labs

## 2018-07-23 NOTE — CONSULTS
allergic to dye [iodides]. REVIEW OF SYSTEMS:  Constitutional: Negative for fever  HENT: Negative for sore throat  Eyes: Negative for redness   Respiratory: Negative for dyspnea, cough  Cardiovascular: Negative for chest pain  Gastrointestinal: + for vomiting, diarrhea   Genitourinary: Negative for hematuria   Musculoskeletal: Negative for arthralgias   Skin: Negative for rash  Neurological: Negative for syncope  Hematological: Negative for adenopathy  Psychiatric/Behavorial: + depression    PHYSICAL EXAM:  Blood pressure (!) 142/69, pulse 69, temperature 97.8 °F (36.6 °C), temperature source Oral, resp. rate 14, height 6' (1.829 m), weight 193 lb 9 oz (87.8 kg), SpO2 99 %.' on RA  General: ill appearing. Eyes: PERRL. No sclera icterus. No conjunctival injection. ENT: No discharge. Pharynx clear. Neck: Trachea midline. Normal thyroid. Resp: No accessory muscle use. No crackles. No wheezing. No rhonchi. No dullness on percussion. CV: Regular rate. Regular rhythm. No mumur or rub. No edema. Peripheral pulses are 2+. Capillary refill is less than 3 seconds. GI:  tender. Non-distended. No masses. No organomegaly. Normal bowel sounds. No hernia. Skin: Warm and dry. No nodule on exposed extremities. No rash on exposed extremities. Lymph: No cervical LAD. No supraclavicular LAD. M/S: No cyanosis. No joint deformity. No clubbing. Neuro: oriented to person and place but mentation is slowed. Patellar reflexes are symmetric. Psych: No agitation, no anxiety, affect diminished.      LABS:  CBC:   Recent Labs      07/23/18 0022  07/23/18   0503   WBC  15.3*  12.9*   HGB  13.7  12.6*   HCT  39.9*  36.9*   MCV  84.9  85.0   PLT  251  226     BMP:   Recent Labs      07/23/18 0022 07/23/18   0503   NA  134*  131*   K  5.0  4.9   CL  97*  99   CO2  23  19*   BUN  50*  51*   CREATININE  3.8*  3.9*     LIVER PROFILE:   Recent Labs      07/23/18 0022 07/23/18   0503   AST  24  21   ALT  15  13   LIPASE  65.0*

## 2018-07-23 NOTE — PROGRESS NOTES
RIJ vas cath placed per Dr. Mauricio Kern. Primary RN updated. Pt tolerated procedure well and left the unit in stable condition.

## 2018-07-23 NOTE — ED PROVIDER NOTES
Emergency [facility] ED    Patient: Eloise Villagran  MRN: [id number]  : 1970  Date of Evaluation: 2018  ED Provider: Patti Herrera MD    Chief Complaint       Chief Complaint   Patient presents with    Emesis     Per pt's partner pt has had nausea since mon. Was prescribed reglan from PCP but has been vomiting since fri and unable to keep anything down since. Janeece Primrose is a 53 y.o. male who presents to the emergency department With reported he's had nausea since Tuesday. He started vomiting Friday and has been vomiting since that time. The patient's primary care provider prescribed him metoclopramide 10 mg 4 times a day Friday with diarrhea. He is not having abdominal pain. History of Hashimoto's disease, coronary artery disease, diabetes, hypertension, high cholesterol, bipolar schizoaffective disorder. The patient denies shortness of breath. He reports immediate sternal chest pain. There's no diaphoresis or dizziness. ROS:     At least 10 systems reviewed and otherwise acutely negative except as in the [address]     Past History     Past Medical History:   Diagnosis Date    Bipolar affective ([address]     CAD (coronary artery disease) 2018     of mLAD    Depression     Diabetes mellitus ([facility] [address]     Hashimoto disease     Hyperlipidemia     Hypertension     Schizophrenia ([facility] [address]     Sleep apnea      Past Surgical History:   Procedure Laterality Date    CARDIAC CATHETERIZATION  2018    100% occlusion of the mLAD    CORONARY ANGIOPLASTY  2018    POBA of LAD     Social History     Social History    Marital status: Single     Spouse name: N/A    Number of children: N/A    Years of education: N/A     Social History Main Topics    Smoking status: Current Some Day Smoker     Types: Cigars    Smokeless tobacco: Never Used      Comment: once a month    Alcohol use No    Drug use: No    Sexual activity: Yes

## 2018-07-23 NOTE — PROGRESS NOTES
4 Eyes Skin Assessment     The patient is being assess for   Admission    I agree that 2 RN's have performed a thorough Head to Toe Skin Assessment on the patient. ALL assessment sites listed below have been assessed. Areas assessed by both nurses:   [x]   Head, Face, and Ears   [x]   Shoulders, Back, and Chest, Abdomen  [x]   Arms, Elbows, and Hands   [x]   Coccyx, Sacrum, and Ischium  [x]   Legs, Feet, and Heels        Show no breakdown. **SHARE this note so that the co-signing nurse is able to place an eSignature**    Co-signer eSignature: Electronically signed by Guicho Dasilva RN on 7/23/18 at 7:29 AM    Does the Patient have Skin Breakdown?   No          Marty Prevention initiated:  No   Wound Care Orders initiated:  No      [facility] nurse consulted for Pressure Injury (Stage 3,4, Unstageable, DTI, NWPT, Complex wounds)and New or Established Ostomies:  No      Primary Nurse eSignature: Electronically signed by Caro Rodriguez RN on 7/23/18 at 7:29 AM

## 2018-07-23 NOTE — FLOWSHEET NOTE
07/23/18 0815   Vital Signs   Pulse 75   Resp 15   BP (!) 140/69   MAP (mmHg) 87   Oxygen Therapy   SpO2 100 %    NSR 76  100%  Ns @ 125 ML/HR  Pt resting in bed, vitals per doc flow. Assessment complete see doc flow. NSR 76 on ICU bedside monitor. SPO2 100% on RA with CSPO2 monitoring. Pt oriented x3 disoriented to time. Pt in bed with eyes closed will respond to questions, flat affect noted. PIV x2 WDL MS @ 125 ml/hr. Vela draining clear surekha urine. Pt repositioned for comfort. Pt significant other and grandmother @ bedside. ICU monitoring in place. Will continue to closely monitor.

## 2018-07-23 NOTE — CONSULTS
Psychiatric Consult Evaluation      Saran Kapoor  [id number]      Active Problem List:  Active Hospital Problems    Diagnosis Date Noted    Severe nausea and vomiting [R11.2] 07/23/2018    Moderate protein-calorie malnutrition ([facility] [address] [E44.0] 07/23/2018    Acute renal failure ([facility] [address] [N17.9]     Accidental lithium poisoning [T56.891A]        Reason for Consult:  Consult ordered by Dr. Teresa Trejo, * for evaluation of medications in the context of lithium toxicity. Elizabeth Celestin is a 54 yo male who was hospitalized for VOLODYMYR. His initial Lithium Level was 3.0 on 7/23/18 0022 and Significant other, Alyssa Mendesk, reports his last dose was 7/19/18 in the evening. She reports that she manages his medications and dispenses them. She is actually quite knowledgeable about his medications and came prepared with a folder and lists of his medications. Luke Mitchell is a poor historian given confusion and level of sedation. She reports good symptom control on Lithium for over 7 years and is also on [place] (q 3 month LAIA). She reports more depression than any other symptom and recently some confusion but she feels this is 2/2 his medical issues. He is currently being treated by [facility] with Loretta Astudillo CNP. Alyssa Fan reports following medication changes but these would not significantly affect Lithium level - added 3 months ago Plavix, Pepcid, Imdur, 6 weeks ago Reglan, Prevacid, 1 month ago Renxa      Initial Presentation:  Taken from H&P   The patient is a 53 y.o. male with PMH below, presents with N/V/D, fatigue and generalized weakness. He reports that he has been nauseated since Tues and he began having emesis on Fri. He reports that he has been unable to keep anything down. He was placed on Reglan by his PCP. This has not helped much. He was also to have an VOLODYMYR. He has had some chest pain and upper abd pain w/ emesis and dry heaves.   He has hx of chronic CP as needed for High Blood Sugar  levothyroxine (SYNTHROID) 150 MCG tablet, Take 150 mcg by mouth nightly  b complex vitamins capsule, Take 1 capsule by mouth 2 times daily  atorvastatin (LIPITOR) 80 MG tablet, Take 1 tablet by mouth nightly  ranolazine (RANEXA) 1000 MG extended release tablet, Take 1 tablet by mouth 2 times daily  omeprazole (PRILOSEC) 20 MG delayed release capsule, Take 1 capsule by mouth daily  famotidine (PEPCID) 20 MG tablet, Take 1 tablet by mouth 2 times daily  metoclopramide (REGLAN) 10 MG tablet, Take 1 tablet by mouth 4 times daily for 10 days  Cinnamon 500 MG CAPS, Take 1 capsule by mouth daily  Omega-3 Fatty Acids (FISH OIL) 1360 MG CAPS, Take 1 capsule by mouth daily  insulin glargine (BASAGLAR KWIKPEN) 100 UNIT/ML injection pen, Inject 10 Units into the skin every morning   metoprolol succinate (TOPROL XL) 25 MG extended release tablet, Take 1 tablet by mouth daily  isosorbide mononitrate (IMDUR) 60 MG extended release tablet, Take 1 tablet by mouth daily  aspirin 81 MG EC tablet, Take 1 tablet by mouth daily  clopidogrel (PLAVIX) 75 MG tablet, Take 1 tablet by mouth daily  lithium (ESKALITH) 450 MG extended release tablet, Take 450 mg by mouth 2 times daily  folic acid (FOLVITE) 1 MG tablet, Take 1 mg by mouth daily  lisinopril (PRINIVIL;ZESTRIL) 20 MG tablet, Take 10 mg by mouth daily   [DISCONTINUED] Chromium 1 MG CAPS, Take 1 mg by mouth  nitroGLYCERIN (NITROSTAT) 0.4 MG SL tablet, up to max of 3 total doses. If no relief after 1 dose, call 911.   Allergies   Allergen Reactions    Dye [Iodides]         Review of Systems  Review of Systems   Unable to perform ROS: Medical condition       Scheduled Meds   aspirin  81 mg Oral Daily    atorvastatin  80 mg Oral Nightly    clopidogrel  75 mg Oral Daily    folic acid  1 mg Oral Daily    insulin glargine  14 Units Subcutaneous Nightly    metoprolol succinate  25 mg Oral Daily    pantoprazole  40 mg Intravenous Daily    sodium Albumin/Globulin Ratio 1.6 1.1 - 2.2    Total Bilirubin 0.7 0.0 - 1.0 mg/dL    Alkaline Phosphatase 90 40 - 129 U/L    ALT 15 10 - 40 U/L    AST 24 15 - 37 U/L    Globulin 2.8 g/dL   Lipase    Collection Time: 07/23/18 12:22 AM   Result Value Ref Range    Lipase 65.0 (H) 13.0 - 60.0 U/L   Troponin    Collection Time: 07/23/18 12:22 AM   Result Value Ref Range    Troponin <0.01 <0.01 ng/mL   Lithium level    Collection Time: 07/23/18 12:22 AM   Result Value Ref Range    Lithium Lvl 3.0 (HH) 0.6 - 1.2 mmol/L    Lithium Dose Amount Unknown    Hemoglobin A1c    Collection Time: 07/23/18 12:22 AM   Result Value Ref Range    Hemoglobin A1C 5.1 See comment %    eAG 99.7 mg/dL   TSH with Reflex    Collection Time: 07/23/18 12:22 AM   Result Value Ref Range    TSH 0.33 0.27 - 4.20 uIU/mL   Acetaminophen level    Collection Time: 07/23/18 12:22 AM   Result Value Ref Range    Acetaminophen Level <5 (L) 10 - 30 ug/mL   EKG 12 Lead    Collection Time: 07/23/18 12:34 AM   Result Value Ref Range    Ventricular Rate 62 BPM    Atrial Rate 62 BPM    P-R Interval 164 ms    QRS Duration 112 ms    Q-T Interval 434 ms    QTc Calculation (Bazett) 440 ms    P Axis 58 degrees    R Axis 44 degrees    T Axis 62 degrees    Diagnosis       Normal sinus rhythmNonspecific ST abnormalityNon-specific intra-ventricular conduction delayAbnormal ECGWhen compared with ECG of 04-FEB-2017 11:19, (unconfirmed)No significant change was foundConfirmed by VITO Osorio MD ([id number]) on 7/23/2018 8:00:05 AM   POCT Glucose    Collection Time: 07/23/18  1:15 AM   Result Value Ref Range    POC Glucose 95 70 - 99 mg/dl    Performed on ACCU-CHEK    Urinalysis    Collection Time: 07/23/18  2:45 AM   Result Value Ref Range    Color, UA Straw Straw/Yellow    Clarity, UA Clear Clear    Glucose, Ur Negative Negative mg/dL    Bilirubin Urine SMALL (A) Negative    Ketones, Urine Negative Negative mg/dL    Specific Gravity, UA >=1.030 1.005 - 1.030    Blood, Urine Negative Negative    pH, UA 5.5 5.0 - 8.0    Protein, UA 30 (A) Negative mg/dL    Urobilinogen, Urine 0.2 <2.0 E.U./dL    Nitrite, Urine Negative Negative    Leukocyte Esterase, Urine Negative Negative    Microscopic Examination YES     Urine Type Not Specified    Microscopic Urinalysis    Collection Time: 07/23/18  2:45 AM   Result Value Ref Range    Casts 0-1 Hyaline (A) /LPF    WBC, UA 3-5 0 - 5 /HPF    RBC, UA None seen 0 - 2 /HPF    Epi Cells 20-50 /HPF    Bacteria, UA Rare (A) /HPF    Amorphous, UA Rare (A) /HPF   POCT Glucose    Collection Time: 07/23/18  4:21 AM   Result Value Ref Range    POC Glucose 82 70 - 99 mg/dl    Performed on ACCU-CHEK    CBC auto differential    Collection Time: 07/23/18  5:03 AM   Result Value Ref Range    WBC 12.9 (H) 4.0 - 11.0 K/uL    RBC 4.35 4.20 - 5.90 M/uL    Hemoglobin 12.6 (L) 13.5 - 17.5 g/dL    Hematocrit 36.9 (L) 40.5 - 52.5 %    MCV 85.0 80.0 - 100.0 fL    MCH 28.9 26.0 - 34.0 pg    MCHC 34.1 31.0 - 36.0 g/dL    RDW 13.4 12.4 - 15.4 %    Platelets 722 869 - 052 K/uL    MPV 8.9 5.0 - 10.5 fL    Neutrophils % 75.2 %    Lymphocytes % 15.8 %    Monocytes % 6.6 %    Eosinophils % 2.1 %    Basophils % 0.3 %    Neutrophils # 9.7 (H) 1.7 - 7.7 K/uL    Lymphocytes # 2.0 1.0 - 5.1 K/uL    Monocytes # 0.9 0.0 - 1.3 K/uL    Eosinophils # 0.3 0.0 - 0.6 K/uL    Basophils # 0.0 0.0 - 0.2 K/uL   Comprehensive Metabolic Panel w/ Reflex to MG    Collection Time: 07/23/18  5:03 AM   Result Value Ref Range    Sodium 131 (L) 136 - 145 mmol/L    Potassium reflex Magnesium 4.9 3.5 - 5.1 mmol/L    Chloride 99 99 - 110 mmol/L    CO2 19 (L) 21 - 32 mmol/L    Anion Gap 13 3 - 16    Glucose 75 70 - 99 mg/dL    BUN 51 (H) 7 - 20 mg/dL    CREATININE 3.9 (H) 0.9 - 1.3 mg/dL    GFR Non-African American 17 (A) >60    GFR  20 (A) >60    Calcium 9.9 8.3 - 10.6 mg/dL    Total Protein 6.3 (L) 6.4 - 8.2 g/dL    Alb 3.7 3.4 - 5.0 g/dL    Albumin/Globulin Ratio 1.4 1.1 - 2.2    Total Bilirubin 0.6 0.0 4.9 mg/dL    Alb 3.6 3.4 - 5.0 g/dL   Protime-INR    Collection Time: 07/23/18 11:17 AM   Result Value Ref Range    Protime 13.2 (H) 9.8 - 13.0 sec    INR 1.16 (H) 0.86 - 1.14   Lithium level    Collection Time: 07/23/18 12:08 PM   Result Value Ref Range    Lithium Dose Amount Unknown        Physical Exam: Per Dr. Yanci Franco MD 7/23/2018    PSYCHIATRIC ASSESSMENT   PSYCHIATRIC EXAMINATION / MENTAL STATUS EXAM:   CONSTITUTIONAL:    General appearance:  [x]  appears age, []  appears older than stated age,     []  adequately dressed and groomed, [x] disheveled,                [] Thin  [] Obese        PSYCHIATRIC:    Relatedness:  [] cooperative, [] guarded, [] indifferent, [] hostile,      [x] sedated  Speech:  [] normal prosody, [] pressured, [x] decreased volume,    [] increased volume [] slurred [] slowed, [x] delayed     [] echolalia, [] incoherent, [] stuttering   Mood:   [] stable, [] depressed, [] anxious, [] irritable,     [] labile  [x] decreased range   Affect:   [] normal range, [x] flat, [] labile, [] anxious,  [] intense     [] mood incongruent, [] blunted    Thought Process: [] logical and coherent, [] circumstantial, [] tangential, [] RANJIT,     [] simplistic, [] disorganized  [] FOI  [] concrete  [x] minimal responses    Thought Content: [] future oriented [] delusions  [] self-harm, [] guilt,     [] hopelessness  [] obsessive  [] superficial [x] lacks content  Hallucinations Reported: [x] None [] auditory,  [] visual,  [] olfactory, [] tactile  Observed RTIS:         [x] None [] auditory  [] visual [] tactile  Delusions  [x] none [] grandiose [] paranoid  [] persecutory  [] somatic     [] bizarre  [] Rastafarian/spiritual    Suicidal ideation  [x] denies, [] endorses  Homicidal ideation [x] denies, [] endorses  Insight:   [] good, [] fair, [x] poor  [] none  Judgment:  [] good, [] fair, [x] poor  [] impulsive   Attention and concentration:     [] intact [] limited [x] impaired  Orientation:  [] person, place,

## 2018-07-23 NOTE — FLOWSHEET NOTE
07/23/18 1104   Vital Signs   Temp 97.4 °F (36.3 °C)   Temp Source Oral   Pulse 66   Resp 15   /73   MAP (mmHg) 85   Level of Consciousness 1   MEWS Score 2   Oxygen Therapy   SpO2 100 %     Pt reassessed see doc flow. NSR 64,  on ICU bedside monitor. SPO2 100% on RA with CSPO2 monitoring. Pt oriented x3 disoriented to year states \"1917\". Pt in bed with eyes closed will respond to questions, flat affect noted. PIV x2 WDL MS @ 125 ml/hr. Vela draining clear surekha urine. Pt repositioned for comfort. Pt significant other and grandmother @ bedside. ICU monitoring in place.  Will continue to closely monitor

## 2018-07-23 NOTE — PLAN OF CARE
Problem: Tissue Perfusion - Renal, Altered:  Goal: Electrolytes within specified parameters  Electrolytes within specified parameters   Outcome: Ongoing      Problem: Risk for Impaired Skin Integrity  Goal: Tissue integrity - skin and mucous membranes  Structural intactness and normal physiological function of skin and  mucous membranes.    Outcome: Ongoing

## 2018-07-23 NOTE — PROGRESS NOTES
Pt received into ICU at 5:20 am. Pt given Chlorhexidine bath tolerated well. Family in room. Dr Fidencio Stallings in to see pt. 2nd liter of fluids infusing. Dr Fidencio Stallings spoke with Nephology. 2nd IV inserted. Bladder scan showed over 300 cc of urine. Pt unable to void. Vela catheter inserted clear surekha urine returned. Pt tolerated well. Securing device attached to leg. Urine specimen sent to lab.

## 2018-07-23 NOTE — PROGRESS NOTES
Brief note    Patient seen by Dr. Vini Spence early this morning. Please see his H&P  44-year-old male admitted with lithium toxicity, acute renal failure. Intensivist and nephrology consulted. Plan Vas-Cath placement and start dialysis.

## 2018-07-23 NOTE — H&P
Hospital Medicine History & Physical      PCP: Funmilayo Lucas, APRN - CNP    Date of Service: Pt seen/examined on 7/23/18 and admitted on 7/23/18 to Inpatient    Chief Complaint   Patient presents with    Emesis     Per pt's partner pt has had nausea since mon. Was prescribed reglan from PCP but has been vomiting since fri and unable to keep anything down since. History Of Present Illness: The patient is a 53 y.o. male with PMH below, presents with N/V/D, fatigue and generalized weakness. He reports that he has been nauseated since Tues and he began having emesis on Fri. He reports that he has been unable to keep anything down. He was placed on Reglan by his PCP. This has not helped much. He was also to have an VOLODYMYR. He has had some chest pain and upper abd pain w/ emesis and dry heaves. He has hx of chronic CP and has known hx of chronic occlusion of his LAD. He denies abd pain. Of note, he has hx of bipolar and schizophrenia and is on lithium. He also has hx of hashimoto's disease. Past Medical History:        Diagnosis Date    Bipolar affective ([facility] [address]     CAD (coronary artery disease) 04/2018     of mLAD    Depression     Diabetes mellitus ([facility] [address]     Hashimoto disease     Hyperlipidemia     Hypertension     Schizophrenia ([facility] [address]     Sleep apnea        Past Surgical History:        Procedure Laterality Date    CARDIAC CATHETERIZATION  03/22/2018    100% occlusion of the mLAD    CORONARY ANGIOPLASTY  05/29/2018    POBA of LAD       Medications Prior to Admission:    Prior to Admission medications    Medication Sig Start Date End Date Taking?  Authorizing Provider   atorvastatin (LIPITOR) 80 MG tablet Take 1 tablet by mouth nightly 7/18/18  Yes Pablo Min MD   ranolazine (RANEXA) 1000 MG extended release tablet Take 1 tablet by mouth 2 times daily 6/28/18  Yes Shantel Melchor MD   omeprazole (PRILOSEC) 20 MG delayed release capsule Take 1 capsule by mouth daily 6/22/18 7/23/18 Yes Elba Montero MD   famotidine (PEPCID) 20 MG tablet Take 1 tablet by mouth 2 times daily 6/22/18  Yes Elba Montreo MD   metoclopramide (REGLAN) 10 MG tablet Take 1 tablet by mouth 4 times daily for 10 days 6/22/18 7/23/18 Yes Elba Montero MD   Cinnamon 500 MG CAPS Take 1 capsule by mouth daily   Yes Historical Provider, MD   Omega-3 Fatty Acids (FISH OIL) 1360 MG CAPS Take 1 capsule by mouth daily   Yes Historical Provider, MD   insulin glargine (BASAGLAR KWIKPEN) 100 UNIT/ML injection pen Inject 28 Units into the skin nightly   Yes Historical Provider, MD   metoprolol succinate (TOPROL XL) 25 MG extended release tablet Take 1 tablet by mouth daily 6/18/18  Yes Deborah Sullivan MD   isosorbide mononitrate (IMDUR) 60 MG extended release tablet Take 1 tablet by mouth daily 6/12/18  Yes Pop Downey MD   aspirin 81 MG EC tablet Take 1 tablet by mouth daily 3/23/18  Yes Kevin Rahman MD   clopidogrel (PLAVIX) 75 MG tablet Take 1 tablet by mouth daily 3/23/18  Yes Kevin Rahman MD   nitroGLYCERIN (NITROSTAT) 0.4 MG SL tablet up to max of 3 total doses. If no relief after 1 dose, call 911. 3/23/18  Yes Kevin Rahman MD   lithium (ESKALITH) 450 MG extended release tablet Take 450 mg by mouth 2 times daily   Yes Historical Provider, MD   folic acid (FOLVITE) 1 MG tablet Take 1 mg by mouth daily   Yes Historical Provider, MD   lisinopril (PRINIVIL;ZESTRIL) 20 MG tablet Take 10 mg by mouth daily    Yes Historical Provider, MD       Allergies:  Dye [iodides]    Social History:    TOBACCO:   reports that he has been smoking Cigars. He has never used smokeless tobacco.  ETOH:   reports that he does not drink alcohol. Family History:  Reviewed in detail and negative for DM, Early CAD, Cancer (except as below). Positive as follows:    History reviewed. No pertinent family history.     REVIEW OF SYSTEMS:   Pertinent positives/negatives as follows: N/V/D, fatigue, generalized weakness, and as discussed in HPI, otherwise a complete ROS performed and all other systems are negative  PHYSICAL EXAM PERFORMED:    /74   Pulse 71   Temp 97.9 °F (36.6 °C) (Oral)   Resp 16   Ht 6' (1.829 m)   Wt 206 lb (93.4 kg)   SpO2 99%   BMI 27.94 kg/m²     GEN:  A&Ox3, NAD. HEENT:  NC/AT,EOMI, dry MM, no erythema/exudates or visible masses. CVS:  Normal S1,S2. RRR. Without M/G/R.   LUNG:   CTA-B. no wheezes, rales or rhonchi  ABD:  Soft, ND/NT, BS+ x4 but mildly diminished. Without G/R.  EXT: 2+ pulses, no c/c/e. Brisk cap refill. PSY:  Thought process grossly intact but mildly slowed, affect mildly flattened. AUSTYN:  CN III-XII intact, moves all 4 spontaneously, sensory grossly intact. SKIN: No rash or lesions on visible skin. Chart review shows recent radiographs:  Xr Chest Portable    Result Date: 7/23/2018  EXAMINATION: SINGLE XRAY VIEW OF THE CHEST 7/23/2018 1:13 am COMPARISON: 06/22/2018 HISTORY: ORDERING SYSTEM PROVIDED HISTORY: chest pain TECHNOLOGIST PROVIDED HISTORY: Reason for exam:->chest pain Ordering Physician Provided Reason for Exam: emesis Acuity: Acute Type of Exam: Unknown Additional signs and symptoms: pt has been vomiting for several days, unable to answer hx questions FINDINGS: The lungs are clear. The costophrenic angles are sharp. The cardiomediastinal silhouette is within normal limits. There is no discernible pneumothorax.      Negative portable chest.     EKG 12 Lead [[id number]] Collected: 07/23/18 0034   Updated: 07/23/18 0542     Ventricular Rate 62 BPM    Atrial Rate 62 BPM    P-R Interval 164 ms    QRS Duration 112 ms    Q-T Interval 434 ms    QTc Calculation (Bazett) 440 ms    P Axis 58 degrees    R Axis 44 degrees    T Axis 62 degrees    Diagnosis Normal sinus rhythmNonspecific T wave abnormalityAbnormal ECGWhen compared with ECG of 04-FEB-2017 11:19, (unconfirmed)Questionable change in QRS durationMinimal criteria for Septal infarct are no longer Present   EKG 12 Lead [[id number]] Collected: 07/23/18 0535   Updated: 07/23/18 0542     Ventricular Rate 70 BPM    Atrial Rate 70 BPM    P-R Interval 170 ms    QRS Duration 108 ms    Q-T Interval 448 ms    QTc Calculation (Bazett) 483 ms    P Axis 61 degrees    R Axis 58 degrees    T Axis 67 degrees    Diagnosis Normal sinus rhythmNonspecific T wave abnormalityProlonged QTAbnormal ECGWhen compared with ECG of 22-JUN-2018 19:00,No significant change was found         CBC   Recent Labs      07/23/18   0022   WBC  15.3*   HGB  13.7   HCT  39.9*   PLT  251      RENAL  Recent Labs      07/23/18   0022   NA  134*   K  5.0   CL  97*   CO2  23   BUN  50*   CREATININE  3.8*     LFT'S  Recent Labs      07/23/18   0022   AST  24   ALT  15   BILITOT  0.7   ALKPHOS  90     CARDIAC ENZYMES  Recent Labs      07/23/18   0022   TROPONINI  <0.01     Lab Results   Component Value Date    PROBNP 52 06/28/2018    PROBNP 21 06/10/2018     U/A:    Lab Results   Component Value Date    LEUKOCYTESUR Negative 07/23/2018    BACTERIA Rare 07/23/2018    WBCUA 3-5 07/23/2018    COLORU Straw 07/23/2018    RBCUA None seen 07/23/2018    MUCUS 2+ 02/26/2010    CLARITYU Clear 07/23/2018    SPECGRAV >=1.030 07/23/2018    BLOODU Negative 07/23/2018    GLUCOSEU Negative 07/23/2018    GLUCOSEU Negative 02/26/2010    AMORPHOUS Rare 07/23/2018     Urine Tox Screen:  Recent Labs      07/23/18   0245   PHUR  5.5     ABG:   Lab Results   Component Value Date    PHART 7.399 01/13/2012    UBA0KOC 33.3 01/13/2012    PO2ART 78.7 01/13/2012    DNX9SFL 20.1 01/13/2012    F2VIOBZO 95.8 01/13/2012    THGBART 15.4 01/13/2012    JVX4SGV 21.1 01/13/2012    BEART -3.7 01/13/2012     VBG:  Lab Results   Component Value Date    FXE8LCO 20.1 11/21/2016    S7NVBJIN 86 11/21/2016    PHVEN 7.37 11/21/2016    KXI3RYY 35.2 11/21/2016    PO2VEN 53.1 11/21/2016        PHYSICIAN CERTIFICATION    I certify that Clarisa Cervantes is expected to be hospitalized for 2 midnights based on the following assessment and plan:    ASSESSMENT/PLAN:  1. VOLODYMYR, Cr 3.8, baseline ~ 1.0, nephro c/s, IVF, chk lithium level. CT A/P w/ contrast pending. 2. Nausea and vomiting, intractable, PRN Zofran and compazine. IVF. 3. Generalized weakness/fatigue, fall precautions. 4. HTN, poorly controlled, cont home BB  5. Schizophrenia/Bipolar, hold home regimen for now. 6. Hashimoto's thyroiditis, cont home levothyroxine at IV adjusted dose. Chk TSH. 7. GERD, cont H2B and PPI, converted both to IV. 8. Acute on chronic CP, cont Ranexa, BB, ASA and Plavix. Current description associated with N/V and dry heaves. Has known hx of chronic occluded LAD. Tele. Addendum 0445: Lithium level came back at 3.0 (added on to labs from 0022), CT a/p was negative for acute. D/w Dr. John Palmer, intensivist on call. Also, spoke with Dr. Rodriguez Salazar (on call nephro), he requested ASAP HD cath placement by IR for emergent HD, if next lithium level is the same or higher. Transfer to ICU. Next value will be drawn shortly. Li level q6h for now. Intensivist and nephro c/s. Pt likely represents lithium poisoning. QTC is noted to be lengthening. 440 ms on previous EKG and 483 ms on repeat. QTc monitoring. Zofran canceled. Place Vela for close I/O monitoring. DVT Prophylaxis: Lovenox  Diet: Clears  Code Status: Full Code   PT/OT Eval Status: Will order if needed and as patient condition allows  Dispo - Admit to inpatient    Due to the immediate potential for life-threatening deterioration due to lithium toxicity/poisoning, I spent 33 minutes providing critical care. This time is excluding time spent performing procedures. Polly Noland MD    Thank you ANA Santos - SHASHI for the opportunity to be involved in this patient's care. If you have any questions or concerns please feel free to contact me via the [employer] Answering Service at [phone].       This chart was generated using the

## 2018-07-24 ENCOUNTER — TELEPHONE (OUTPATIENT)
Dept: PULMONOLOGY | Age: 48
End: 2018-07-24

## 2018-07-24 LAB
A/G RATIO: 1.5 (ref 1.1–2.2)
ALBUMIN SERPL-MCNC: 3.4 G/DL (ref 3.4–5)
ALBUMIN SERPL-MCNC: 3.4 G/DL (ref 3.4–5)
ALP BLD-CCNC: 86 U/L (ref 40–129)
ALT SERPL-CCNC: 11 U/L (ref 10–40)
ANION GAP SERPL CALCULATED.3IONS-SCNC: 12 MMOL/L (ref 3–16)
ANION GAP SERPL CALCULATED.3IONS-SCNC: 13 MMOL/L (ref 3–16)
APTT: 31.1 SEC (ref 26–36)
AST SERPL-CCNC: 19 U/L (ref 15–37)
BILIRUB SERPL-MCNC: 0.8 MG/DL (ref 0–1)
BUN BLDV-MCNC: 13 MG/DL (ref 7–20)
BUN BLDV-MCNC: 13 MG/DL (ref 7–20)
CALCIUM SERPL-MCNC: 8.3 MG/DL (ref 8.3–10.6)
CALCIUM SERPL-MCNC: 8.4 MG/DL (ref 8.3–10.6)
CHLORIDE BLD-SCNC: 102 MMOL/L (ref 99–110)
CHLORIDE BLD-SCNC: 104 MMOL/L (ref 99–110)
CO2: 21 MMOL/L (ref 21–32)
CO2: 21 MMOL/L (ref 21–32)
CREAT SERPL-MCNC: 1.2 MG/DL (ref 0.9–1.3)
CREAT SERPL-MCNC: 1.3 MG/DL (ref 0.9–1.3)
GFR AFRICAN AMERICAN: >60
GFR AFRICAN AMERICAN: >60
GFR NON-AFRICAN AMERICAN: 59
GFR NON-AFRICAN AMERICAN: >60
GLOBULIN: 2.2 G/DL
GLUCOSE BLD-MCNC: 101 MG/DL (ref 70–99)
GLUCOSE BLD-MCNC: 102 MG/DL (ref 70–99)
GLUCOSE BLD-MCNC: 117 MG/DL (ref 70–99)
GLUCOSE BLD-MCNC: 120 MG/DL (ref 70–99)
GLUCOSE BLD-MCNC: 94 MG/DL (ref 70–99)
GLUCOSE BLD-MCNC: 95 MG/DL (ref 70–99)
HCT VFR BLD CALC: 33.3 % (ref 40.5–52.5)
HCT VFR BLD CALC: 34.5 % (ref 40.5–52.5)
HEMOGLOBIN: 11.6 G/DL (ref 13.5–17.5)
HEMOGLOBIN: 12 G/DL (ref 13.5–17.5)
INR BLD: 1.17 (ref 0.86–1.14)
LITHIUM DOSE AMOUNT: NORMAL
LITHIUM DOSE AMOUNT: NORMAL
LITHIUM LEVEL: 1 MMOL/L (ref 0.6–1.2)
LITHIUM LEVEL: 1.2 MMOL/L (ref 0.6–1.2)
MCH RBC QN AUTO: 28.8 PG (ref 26–34)
MCH RBC QN AUTO: 29.1 PG (ref 26–34)
MCHC RBC AUTO-ENTMCNC: 34.7 G/DL (ref 31–36)
MCHC RBC AUTO-ENTMCNC: 34.7 G/DL (ref 31–36)
MCV RBC AUTO: 83 FL (ref 80–100)
MCV RBC AUTO: 83.9 FL (ref 80–100)
PDW BLD-RTO: 13.2 % (ref 12.4–15.4)
PDW BLD-RTO: 13.4 % (ref 12.4–15.4)
PERFORMED ON: ABNORMAL
PERFORMED ON: ABNORMAL
PERFORMED ON: NORMAL
PERFORMED ON: NORMAL
PHOSPHORUS: 2 MG/DL (ref 2.5–4.9)
PHOSPHORUS: 2.1 MG/DL (ref 2.5–4.9)
PLATELET # BLD: 197 K/UL (ref 135–450)
PLATELET # BLD: 199 K/UL (ref 135–450)
PMV BLD AUTO: 8.7 FL (ref 5–10.5)
PMV BLD AUTO: 9 FL (ref 5–10.5)
POTASSIUM REFLEX MAGNESIUM: 4.1 MMOL/L (ref 3.5–5.1)
POTASSIUM SERPL-SCNC: 3.9 MMOL/L (ref 3.5–5.1)
POTASSIUM SERPL-SCNC: 4.1 MMOL/L (ref 3.5–5.1)
PROTHROMBIN TIME: 13.3 SEC (ref 9.8–13)
RBC # BLD: 4.02 M/UL (ref 4.2–5.9)
RBC # BLD: 4.11 M/UL (ref 4.2–5.9)
SODIUM BLD-SCNC: 135 MMOL/L (ref 136–145)
SODIUM BLD-SCNC: 138 MMOL/L (ref 136–145)
TOTAL PROTEIN: 5.6 G/DL (ref 6.4–8.2)
WBC # BLD: 10.7 K/UL (ref 4–11)
WBC # BLD: 10.9 K/UL (ref 4–11)

## 2018-07-24 PROCEDURE — 2580000003 HC RX 258: Performed by: EMERGENCY MEDICINE

## 2018-07-24 PROCEDURE — 97530 THERAPEUTIC ACTIVITIES: CPT

## 2018-07-24 PROCEDURE — 97535 SELF CARE MNGMENT TRAINING: CPT

## 2018-07-24 PROCEDURE — 36415 COLL VENOUS BLD VENIPUNCTURE: CPT

## 2018-07-24 PROCEDURE — 97166 OT EVAL MOD COMPLEX 45 MIN: CPT

## 2018-07-24 PROCEDURE — 6360000002 HC RX W HCPCS: Performed by: INTERNAL MEDICINE

## 2018-07-24 PROCEDURE — 6370000000 HC RX 637 (ALT 250 FOR IP): Performed by: INTERNAL MEDICINE

## 2018-07-24 PROCEDURE — 80053 COMPREHEN METABOLIC PANEL: CPT

## 2018-07-24 PROCEDURE — 97162 PT EVAL MOD COMPLEX 30 MIN: CPT

## 2018-07-24 PROCEDURE — 6370000000 HC RX 637 (ALT 250 FOR IP): Performed by: NURSE PRACTITIONER

## 2018-07-24 PROCEDURE — 85610 PROTHROMBIN TIME: CPT

## 2018-07-24 PROCEDURE — 99233 SBSQ HOSP IP/OBS HIGH 50: CPT | Performed by: INTERNAL MEDICINE

## 2018-07-24 PROCEDURE — G8979 MOBILITY GOAL STATUS: HCPCS

## 2018-07-24 PROCEDURE — 2000000000 HC ICU R&B

## 2018-07-24 PROCEDURE — 80178 ASSAY OF LITHIUM: CPT

## 2018-07-24 PROCEDURE — 2580000003 HC RX 258: Performed by: INTERNAL MEDICINE

## 2018-07-24 PROCEDURE — C9113 INJ PANTOPRAZOLE SODIUM, VIA: HCPCS | Performed by: INTERNAL MEDICINE

## 2018-07-24 PROCEDURE — 85027 COMPLETE CBC AUTOMATED: CPT

## 2018-07-24 PROCEDURE — 85730 THROMBOPLASTIN TIME PARTIAL: CPT

## 2018-07-24 PROCEDURE — G8988 SELF CARE GOAL STATUS: HCPCS

## 2018-07-24 PROCEDURE — G8987 SELF CARE CURRENT STATUS: HCPCS

## 2018-07-24 PROCEDURE — G8978 MOBILITY CURRENT STATUS: HCPCS

## 2018-07-24 RX ADMIN — ATORVASTATIN CALCIUM 80 MG: 40 TABLET, FILM COATED ORAL at 20:57

## 2018-07-24 RX ADMIN — METOPROLOL SUCCINATE 25 MG: 25 TABLET, FILM COATED, EXTENDED RELEASE ORAL at 08:36

## 2018-07-24 RX ADMIN — Medication 10 ML: at 08:36

## 2018-07-24 RX ADMIN — LAMOTRIGINE 25 MG: 25 TABLET ORAL at 08:39

## 2018-07-24 RX ADMIN — Medication 10 ML: at 20:58

## 2018-07-24 RX ADMIN — MUPIROCIN: 20 OINTMENT TOPICAL at 08:39

## 2018-07-24 RX ADMIN — PANTOPRAZOLE SODIUM 40 MG: 40 INJECTION, POWDER, FOR SOLUTION INTRAVENOUS at 08:36

## 2018-07-24 RX ADMIN — MUPIROCIN: 20 OINTMENT TOPICAL at 20:58

## 2018-07-24 RX ADMIN — SODIUM CHLORIDE: 9 INJECTION, SOLUTION INTRAVENOUS at 20:58

## 2018-07-24 RX ADMIN — LEVOTHYROXINE SODIUM 150 MCG: 150 TABLET ORAL at 05:35

## 2018-07-24 RX ADMIN — SODIUM CHLORIDE: 9 INJECTION, SOLUTION INTRAVENOUS at 05:38

## 2018-07-24 RX ADMIN — FOLIC ACID 1 MG: 1 TABLET ORAL at 08:36

## 2018-07-24 RX ADMIN — SODIUM CHLORIDE: 9 INJECTION, SOLUTION INTRAVENOUS at 14:19

## 2018-07-24 RX ADMIN — PROCHLORPERAZINE EDISYLATE 10 MG: 5 INJECTION INTRAMUSCULAR; INTRAVENOUS at 06:34

## 2018-07-24 RX ADMIN — PROCHLORPERAZINE EDISYLATE 10 MG: 5 INJECTION INTRAMUSCULAR; INTRAVENOUS at 20:58

## 2018-07-24 ASSESSMENT — PAIN SCALES - GENERAL
PAINLEVEL_OUTOF10: 0
PAINLEVEL_OUTOF10: 0

## 2018-07-24 NOTE — PROGRESS NOTES
ranexa    LAYO  - previously on CPAP    DVT Prophylaxis: SCD  Diet: Dietary Nutrition Supplements: Clear Liquid Oral Supplement  DIET CLEAR LIQUID;  Code Status: Full Code    Hospital course and plan of care discussed with Dr. Marlon Ponce PA-C  7/24/2018 5:00 PM

## 2018-07-24 NOTE — FLOWSHEET NOTE
07/24/18 0800   Vital Signs   Temp 97.9 °F (36.6 °C)   Temp Source Oral   Pulse 71   Resp 23   /79   MAP (mmHg) 94   Oxygen Therapy   SpO2 96 %    NSR 68    96%  Pt resting in bed, vitals per doc flow. Assessment complete see doc flow. NSR 68  on ICU bedside monitor. SPO2 96%on RA with CSPO2 monitoring. Pt resting in bed w/ eyes closed will open when asked, oriented x3 disoriented to year. PIV x 2 WDL NS @ 125 ml/hr. RIJ vascath noted, old blood noted to dressing. Vela draining clear yellow urine. Pt repositioned for comfort. Will continue to closely monitor.

## 2018-07-24 NOTE — PROGRESS NOTES
Inpatient Occupational Therapy  Evaluation and Treatment    Unit: ICU    Date:  7/24/2018  Patient Name:    Eloise Villagran  Admitting diagnosis:  Severe nausea and vomiting [R11.2]  Admit Date:  7/23/2018  Precautions/Restrictions/WB Status/ Lines/ Wounds/ Oxygen:ICU monitoring, IV, R vas cath, IV, fall risk, bed/chair alarm  Treatment Time:  8999-6556  Treatment Number: 1    Patient Goals for Therapy:  \" not stated \"  02 Spo2 no lower than 94% during eval   Discharge Recommendations: SNF- pending progress  AM-PAC Score:  13    DME needs for discharge: continue to assess      Preadmission Environment:    Pt. Lives with SO  Home environment:   two story home- however lives on the first floor  Steps to enter first floor:   1  Bathroom: Walk-in shower, reports seat in shower  Equipment owned: ashlee Garcia w/sheila     Preadmission Status   Pt. Able to drive - occasionally, S.O. Able to provide transportation  Pt. Had assistance from significant other for dressing, bathing, cooking, laundry, cleaning   Pt. Fully independent for transfers and gait and walked with no AD  Hobbies: Computer and [employer]  History of falls: denies    Pain:  No pain complaints. Cognition:    A&Ox4  Follows 1 step and 2 step commands. Upper Extremity ROM:    WFL, pt able to perform all bed mobility, transfers, and gait without ROM limitation. Upper Extremity Strength:    WFL, pt able to perform all bed mobility, transfers, and gait without strength limitation. Upper Extremity Sensation:    No apparent deficits. Upper Extremity Proprioception:    No apparent deficits.     Coordination and Tone:    fair  Balance:    Static Sitting:WFL  Dynamic Sitting:impaired  Static Standing:impaired  Dynamic Standing:impaired    Bed mobility:    Supine to sit:min assist   Sit to supine:NT  Scooting to head of bed:  Scooting in sitting:CGA    Transfers:    Sit to stand:CGA  Stand to sit:min assist   Bed to Chair:min assist   Bed to

## 2018-07-24 NOTE — PROGRESS NOTES
[facility]    Follow up Note           Reason for Consult: VOLODYMYR  Requesting Physician:  Dr. Corbin Travis    Chief Complaint:  N/v    Sub/interval history  Pt eating breakfast , pt is still drowsy but seems to be bit more oriented  HD on  for 4h  Lithium level up to 1.2 after drop to 0.9  Bleeding around the HD cath site overnight needing pressure application and multiple dressing changes     Last 24 h uop 2.1l    ROS: No chest pain/shortness of breath/fever/nausea/vomiting  PSFH: + visitor    Scheduled Meds:   aspirin  81 mg Oral Daily    atorvastatin  80 mg Oral Nightly    clopidogrel  75 mg Oral Daily    folic acid  1 mg Oral Daily    insulin glargine  14 Units Subcutaneous Nightly    metoprolol succinate  25 mg Oral Daily    pantoprazole  40 mg Intravenous Daily    sodium chloride flush  10 mL Intravenous 2 times per day    insulin lispro  0-12 Units Subcutaneous TID WC    insulin lispro  0-6 Units Subcutaneous Nightly    mupirocin   Nasal BID    levothyroxine  150 mcg Oral Daily    lamoTRIgine  25 mg Oral Daily     Continuous Infusions:   sodium chloride 125 mL/hr at 18 0538    dextrose       PRN Meds:.nitroGLYCERIN, sodium chloride flush, glucose, dextrose, glucagon (rDNA), dextrose, prochlorperazine, hydrALAZINE, heparin (porcine)      History of Present Illness:    53 y.o. yo male with PMH of CAD, HTN Bipolar disorder, Hashimoto thyroiditis who is admitted for volodymyr and lithium toxicity  Pt had li level of 3 which decreased to 2.8. He is drowsy and can tell me the name and his  but cannot tell me today;s date or current president  His qtc is prolonged.  His uop seems to have picked up but cr is sluggish to improve; no seizure     Physical exam:   Constitutional:  VITALS:  /79   Pulse 68   Temp 97.9 °F (36.6 °C) (Oral)   Resp 16   Ht 6' (1.829 m)   Wt 194 lb 7.1 oz (88.2 kg)   SpO2 98%   BMI 26.37 kg/m²   Gen: alert, awake, nad  Skin: no rash, turgor

## 2018-07-24 NOTE — SIGNIFICANT EVENT
Was notified earlier in the night that pt was bleeding from vascath site in R neck. Requested pressure dressing which the RN staff had provided. Notified at this time that pt is still bleeding significantly. Removed dressing and large clot noted to be adhered to pt's hair and line. This was cleaned away w/ 4x4's. Small but steady bleeding was noted to be coming from the cephalic and anterior side of the actual wound of the vascath site. No bleeding was noted outside of the puncture site. Firm direct pressure for ~ 10 min was provided by me. Bleeding continued. Several silver nitrate pencils were sequentially applied and held to the inner and superficial rim of the wound site. Next the previous Biospot was removed and a Stat Seal Disc (sz large) was applied around vascath base, abutting the wound. 15 min of direct pressure requested. Pt still had some bleeding for next hour or so but it was significantly less than prior to intervention. Recehcked on pt ~ 310 am after was informed there still may be bleeding. Dressing removed and left to open air for ~5 min. Small clot formation noted around Stat Seal Disc but no active bleeding noted. Added labs to am.      Due to the immediate potential for life-threatening deterioration due to active bleeding from central line site, I spent 32 minutes providing critical care. This time is excluding time spent performing procedures.

## 2018-07-24 NOTE — PROGRESS NOTES
5x/week while in acute care setting for therapeutic exercises, bed mobility, transfers, progressive gait training, balance training, and family/patient education.      Timed Code Treatment Minutes:  19 minutes  Total Treatment Time:   29 minutes    Martina Vargas PT, DPT #[id number]

## 2018-07-25 LAB
ALBUMIN SERPL-MCNC: 3.3 G/DL (ref 3.4–5)
ANION GAP SERPL CALCULATED.3IONS-SCNC: 10 MMOL/L (ref 3–16)
BUN BLDV-MCNC: 9 MG/DL (ref 7–20)
CALCIUM SERPL-MCNC: 8.7 MG/DL (ref 8.3–10.6)
CHLORIDE BLD-SCNC: 108 MMOL/L (ref 99–110)
CO2: 21 MMOL/L (ref 21–32)
CREAT SERPL-MCNC: 0.9 MG/DL (ref 0.9–1.3)
GFR AFRICAN AMERICAN: >60
GFR NON-AFRICAN AMERICAN: >60
GLUCOSE BLD-MCNC: 114 MG/DL (ref 70–99)
GLUCOSE BLD-MCNC: 115 MG/DL (ref 70–99)
GLUCOSE BLD-MCNC: 116 MG/DL (ref 70–99)
GLUCOSE BLD-MCNC: 132 MG/DL (ref 70–99)
GLUCOSE BLD-MCNC: 203 MG/DL (ref 70–99)
HCT VFR BLD CALC: 33 % (ref 40.5–52.5)
HEMOGLOBIN: 11.5 G/DL (ref 13.5–17.5)
HEPATITIS B CORE TOTAL ANTIBODY: NEGATIVE
LITHIUM DOSE AMOUNT: NORMAL
LITHIUM LEVEL: 0.8 MMOL/L (ref 0.6–1.2)
MCH RBC QN AUTO: 29 PG (ref 26–34)
MCHC RBC AUTO-ENTMCNC: 34.8 G/DL (ref 31–36)
MCV RBC AUTO: 83.3 FL (ref 80–100)
PDW BLD-RTO: 13.8 % (ref 12.4–15.4)
PERFORMED ON: ABNORMAL
PHOSPHORUS: 1.5 MG/DL (ref 2.5–4.9)
PLATELET # BLD: 198 K/UL (ref 135–450)
PMV BLD AUTO: 8.7 FL (ref 5–10.5)
POTASSIUM SERPL-SCNC: 4.3 MMOL/L (ref 3.5–5.1)
RBC # BLD: 3.96 M/UL (ref 4.2–5.9)
SODIUM BLD-SCNC: 139 MMOL/L (ref 136–145)
WBC # BLD: 8.6 K/UL (ref 4–11)

## 2018-07-25 PROCEDURE — 6370000000 HC RX 637 (ALT 250 FOR IP): Performed by: INTERNAL MEDICINE

## 2018-07-25 PROCEDURE — 2580000003 HC RX 258: Performed by: INTERNAL MEDICINE

## 2018-07-25 PROCEDURE — 2580000003 HC RX 258: Performed by: EMERGENCY MEDICINE

## 2018-07-25 PROCEDURE — C9113 INJ PANTOPRAZOLE SODIUM, VIA: HCPCS | Performed by: INTERNAL MEDICINE

## 2018-07-25 PROCEDURE — 6360000002 HC RX W HCPCS: Performed by: INTERNAL MEDICINE

## 2018-07-25 PROCEDURE — 2500000003 HC RX 250 WO HCPCS: Performed by: INTERNAL MEDICINE

## 2018-07-25 PROCEDURE — 36592 COLLECT BLOOD FROM PICC: CPT

## 2018-07-25 PROCEDURE — 6370000000 HC RX 637 (ALT 250 FOR IP): Performed by: NURSE PRACTITIONER

## 2018-07-25 PROCEDURE — 99232 SBSQ HOSP IP/OBS MODERATE 35: CPT | Performed by: INTERNAL MEDICINE

## 2018-07-25 PROCEDURE — 97110 THERAPEUTIC EXERCISES: CPT

## 2018-07-25 PROCEDURE — 97535 SELF CARE MNGMENT TRAINING: CPT

## 2018-07-25 PROCEDURE — 99233 SBSQ HOSP IP/OBS HIGH 50: CPT | Performed by: INTERNAL MEDICINE

## 2018-07-25 PROCEDURE — 1200000000 HC SEMI PRIVATE

## 2018-07-25 PROCEDURE — 80178 ASSAY OF LITHIUM: CPT

## 2018-07-25 PROCEDURE — 2060000000 HC ICU INTERMEDIATE R&B

## 2018-07-25 PROCEDURE — 80069 RENAL FUNCTION PANEL: CPT

## 2018-07-25 PROCEDURE — 85027 COMPLETE CBC AUTOMATED: CPT

## 2018-07-25 RX ORDER — RANOLAZINE 500 MG/1
1000 TABLET, EXTENDED RELEASE ORAL 2 TIMES DAILY
Status: DISCONTINUED | OUTPATIENT
Start: 2018-07-25 | End: 2018-07-28 | Stop reason: HOSPADM

## 2018-07-25 RX ORDER — AMLODIPINE BESYLATE 5 MG/1
10 TABLET ORAL DAILY
Status: DISCONTINUED | OUTPATIENT
Start: 2018-07-25 | End: 2018-07-28 | Stop reason: HOSPADM

## 2018-07-25 RX ADMIN — LAMOTRIGINE 25 MG: 25 TABLET ORAL at 08:24

## 2018-07-25 RX ADMIN — RANOLAZINE 1000 MG: 500 TABLET, FILM COATED, EXTENDED RELEASE ORAL at 09:32

## 2018-07-25 RX ADMIN — ATORVASTATIN CALCIUM 80 MG: 40 TABLET, FILM COATED ORAL at 22:14

## 2018-07-25 RX ADMIN — RANOLAZINE 1000 MG: 500 TABLET, FILM COATED, EXTENDED RELEASE ORAL at 22:14

## 2018-07-25 RX ADMIN — INSULIN LISPRO 4 UNITS: 100 INJECTION, SOLUTION INTRAVENOUS; SUBCUTANEOUS at 11:28

## 2018-07-25 RX ADMIN — METOPROLOL SUCCINATE 25 MG: 25 TABLET, FILM COATED, EXTENDED RELEASE ORAL at 08:21

## 2018-07-25 RX ADMIN — LEVOTHYROXINE SODIUM 150 MCG: 150 TABLET ORAL at 06:43

## 2018-07-25 RX ADMIN — SODIUM PHOSPHATE, MONOBASIC, MONOHYDRATE 30 MMOL: 276; 142 INJECTION, SOLUTION INTRAVENOUS at 09:32

## 2018-07-25 RX ADMIN — FOLIC ACID 1 MG: 1 TABLET ORAL at 08:21

## 2018-07-25 RX ADMIN — AMLODIPINE BESYLATE 10 MG: 5 TABLET ORAL at 09:32

## 2018-07-25 RX ADMIN — Medication 10 ML: at 08:21

## 2018-07-25 RX ADMIN — SODIUM CHLORIDE: 9 INJECTION, SOLUTION INTRAVENOUS at 22:14

## 2018-07-25 RX ADMIN — INSULIN GLARGINE 14 UNITS: 100 INJECTION, SOLUTION SUBCUTANEOUS at 22:21

## 2018-07-25 RX ADMIN — SODIUM CHLORIDE: 9 INJECTION, SOLUTION INTRAVENOUS at 06:43

## 2018-07-25 RX ADMIN — PANTOPRAZOLE SODIUM 40 MG: 40 INJECTION, POWDER, FOR SOLUTION INTRAVENOUS at 08:21

## 2018-07-25 RX ADMIN — MUPIROCIN: 20 OINTMENT TOPICAL at 08:23

## 2018-07-25 NOTE — PROGRESS NOTES
07/25/18 0829    dextrose         PRN Meds:  nitroGLYCERIN, sodium chloride flush, glucose, dextrose, glucagon (rDNA), dextrose, prochlorperazine, hydrALAZINE, heparin (porcine)      Data:  CBC:   Recent Labs      07/24/18   0245  07/24/18   0753  07/25/18   0511   WBC  10.9  10.7  8.6   HGB  12.0*  11.6*  11.5*   HCT  34.5*  33.3*  33.0*   MCV  83.9  83.0  83.3   PLT  197  199  198     BMP:   Recent Labs      07/24/18   0245  07/24/18   0753  07/25/18   0511   NA  138  135*  139   K  4.1  4.1  3.9  4.3   CL  104  102  108   CO2  21  21  21   PHOS  2.1*  2.0*  1.5*   BUN  13  13  9   CREATININE  1.3  1.2  0.9     LIVER PROFILE:   Recent Labs      07/23/18   0022  07/23/18   0503  07/24/18   0245   AST  24  21  19   ALT  15  13  11   LIPASE  65.0*   --    --    BILITOT  0.7  0.6  0.8   ALKPHOS  90  82  86     PT/INR:   Recent Labs      07/23/18   1117  07/24/18   0245   PROTIME  13.2*  13.3*   INR  1.16*  1.17*          RADIOLOGY    IR NONTUNNELED VASCULAR CATHETER > 5 YEARS   Final Result   Successful ultrasound and fluoroscopy guided non-tunneled Trialysis catheter   placement. CT ABDOMEN PELVIS WO CONTRAST   Final Result   Diverticulosis without scan evidence for diverticulitis or other acute   process. XR CHEST PORTABLE   Final Result   Negative portable chest.               Assessment/Plan:    Acute renal failure  Acute lithium toxicity  - admitted to ICU. Intensivist and nephro consulted   - vas cath placed and underwent HD on 7/23  - Renal function monitored- VOLODYMYR resolved  - lithium level monitored- down to 0.8 at last check  - remains on IVF  - bleeding from vas cath 7/24- now resolved   - Acute renal failure resolved, lithium toxicity resolved-hemodialysis catheter has been removed today      Hashimoto's thyroiditis  - PO synthroid continued     Bipolar DO  - appreciate psychiatry recs: holding lithium. lamictal added.   PRN risperdal     Hypophosphatemia   - replete    IDDM  - cont lantus and SSI    CAD with chronic chest pain  HTN  - ASA and plavix on hold with vas cath bleeding.   Can resume in a.m.   - Cont toprol xl, statin   - holding  ranexa    LAYO  - previously on CPAP    DVT Prophylaxis: SCD  Diet: Dietary Nutrition Supplements: Clear Liquid Oral Supplement  DIET CARB CONTROL;  Code Status: Full Code     stable, transfer out of ICU      Jaxon Garcia MD  7/25/2018 3:28 PM

## 2018-07-25 NOTE — PROGRESS NOTES
Shift change, bedside report given to  [facility]. Pt exhibits no s/s of distress. Call light in reach. Care has been transferred at this time.

## 2018-07-25 NOTE — CARE COORDINATION
INTERDISCIPLINARY PLAN OF CARE CONFERENCE    Date/Time: 7/25/2018 9:22 AM  Completed by: Rica Ling Case Management      Patient Name:  Geovanna Ardon  YOB: 1970  Admitting Diagnosis: Severe nausea and vomiting [R11.2]     Admit Date/Time:  7/23/2018 12:05 AM    Chart reviewed. Interdisciplinary team met to discuss patient progress and discharge plans. Disciplines included Case Management, Nursing, and Dietitian. Current Status:ongoing,monitor vital signs,labs    Anticipated Discharge Date: TBD  Expected D/C Disposition:  Home vrs SNF  Confirmed plan with patient and/or family Yes  Discharge Plan Comments: Reviewed chart. Pt in ICU. Pt from home with S.O. Plan home vrs SNF. SNF list provided to S.O. at bedside,+eCOC. Following.            Home O2 in place on admit: No  Pt informed of need to bring portable home O2 tank on day of discharge for nursing to connect prior to leaving:  Not Indicated  Verbalized agreement/Understanding:  Not Indicated

## 2018-07-25 NOTE — PROGRESS NOTES
Inpatient Physical Therapy Daily Treatment Note    Unit: ICU  Date:  7/25/2018  Patient Name:    Irina Bedolla  Admitting diagnosis:  Severe nausea and vomiting [R11.2]  Admit Date:  7/23/2018  Precautions/Restrictions:  ICU monitoring, IV, R vas cath, IV, fall risk, bed/chair alarm, castellanos catheter    Discharge Recommendations: SNF  AM-PAC Score: 15  DME needs at discharge: Defer to facility     Treatment Time: 9756-1088  Treatment Number:  2    Subjective    Pt. Supine in bed with S.O. present. Pt. agreeable to therapy treatment this AM.    Pain    Denies    Bed Mobility   Supine to Sit: Mod A   Sit to Supine: DNT, pt in chair  Rolling: DNT  Scooting: Min A to EOB    Transfer Training   Sit to stand: Min A  Stand to sit: Min A   Bed to Chair: Min A with unilateral HHA    Gait Training Deferred  Patient ambulated with     Therapeutic Exercise   Ankle Pumps: x 20 bilaterally  Heel Slides: x 10 bilaterally  LAQ: x 10 bilaterally    Cervical extension: x 5   Cervical combined SB/rotation to R: x 5  Scapular retraction: x 10    Balance     Sitting: Fair at EOB, significant forward flexed posture and L lateral lean  Standing: Fair with unilateral HHA    Patient Education       Role of acute care PT, POC, safe sequencing with transfer    Positioning Needs       Pt sitting upright in chair with alarm activated and needs/call light within reach. Pillow positioned on L side to support midline posturing. Activity Tolerance     Vitals stable throughout treatment  HR: 90s   SpO2: >/= 93% on RA    Assessment   Patient with poor sitting posture with kyphosis, L lateral lean, L cervical SB. Pt able to demonstrate slight improvement with posture with seated exercises. Pt continuing to require HHA for transfer, trial RW next visit. Recommend SNF at discharge to safely progress pt's IND with functional mobility. Plan   Continue with plan of care.     At end of treatment, patient in  chair  with call light and needs

## 2018-07-25 NOTE — PROGRESS NOTES
Pt is slowly awakening and being more active in bed. Pt removed PIV from right hand, states he didn't realize it was in his hand.

## 2018-07-25 NOTE — PROGRESS NOTES
Shift assessment complete. Medications administered at this time. No s/s of distress, pt still a little drowsy. Pt expresses no further needs at this time. Call light in reach.

## 2018-07-25 NOTE — PROGRESS NOTES
Pulmonary & Critical Care Medicine ICU Progress Note    CC: confusion, lithium intoxication    Events of Last 24 hours:    no new issues  [place] is 12    Invasive Lines: IV: RIJ HD Cath    IV:   sodium chloride 125 mL/hr at 18    dextrose         Vitals:  Blood pressure (!) 167/86, pulse 74, temperature 98.2 °F (36.8 °C), temperature source Oral, resp. rate 15, height 6' (1.829 m), weight 195 lb 15.8 oz (88.9 kg), SpO2 99 %. on RA  Temp  Av.2 °F (36.8 °C)  Min: 97.9 °F (36.6 °C)  Max: 98.5 °F (36.9 °C)    Intake/Output Summary (Last 24 hours) at 18 0527  Last data filed at 18 2349   Gross per 24 hour   Intake             3116 ml   Output             3400 ml   Net             -284 ml     EXAM:  General: NAD     Eyes: PERRL. No sclera icterus. No conjunctival injection. ENT: No discharge. Pharynx clear. Neck: Trachea midline. Normal thyroid. mallampati is 4. Circumference ca 18 inches  Resp: No accessory muscle use. No crackles. No wheezing. No rhonchi. No dullness on percussion. CV: Regular rate. Regular rhythm. No mumur or rub. + edema. Peripheral pulses are 2+. Capillary refill is less than 3 seconds. GI: Non-tender. Non-distended. No masses. No organomegaly. Normal bowel sounds. No hernia. Skin: Warm and dry. No nodule on exposed extremities. No rash on exposed extremities. Lymph: No cervical LAD. No supraclavicular LAD. M/S: No cyanosis. No joint deformity. No clubbing. Neuro: A&O to person, hospital, & year.  Patellar reflexes are symmetric  Psych: No agitation, no anxiety, affect is full     Scheduled Meds:   atorvastatin  80 mg Oral Nightly    folic acid  1 mg Oral Daily    insulin glargine  14 Units Subcutaneous Nightly    metoprolol succinate  25 mg Oral Daily    pantoprazole  40 mg Intravenous Daily    sodium chloride flush  10 mL Intravenous 2 times per day    insulin lispro  0-12 Units Subcutaneous TID WC    insulin lispro  0-6 Units Subcutaneous

## 2018-07-26 ENCOUNTER — TELEPHONE (OUTPATIENT)
Dept: PULMONOLOGY | Age: 48
End: 2018-07-26

## 2018-07-26 LAB
ALBUMIN SERPL-MCNC: 3.5 G/DL (ref 3.4–5)
ANION GAP SERPL CALCULATED.3IONS-SCNC: 11 MMOL/L (ref 3–16)
BUN BLDV-MCNC: 9 MG/DL (ref 7–20)
CALCIUM SERPL-MCNC: 9.1 MG/DL (ref 8.3–10.6)
CHLORIDE BLD-SCNC: 108 MMOL/L (ref 99–110)
CO2: 21 MMOL/L (ref 21–32)
CREAT SERPL-MCNC: 0.9 MG/DL (ref 0.9–1.3)
GFR AFRICAN AMERICAN: >60
GFR NON-AFRICAN AMERICAN: >60
GLUCOSE BLD-MCNC: 116 MG/DL (ref 70–99)
GLUCOSE BLD-MCNC: 165 MG/DL (ref 70–99)
GLUCOSE BLD-MCNC: 176 MG/DL (ref 70–99)
GLUCOSE BLD-MCNC: 197 MG/DL (ref 70–99)
GLUCOSE BLD-MCNC: 96 MG/DL (ref 70–99)
HCT VFR BLD CALC: 36.2 % (ref 40.5–52.5)
HEMOGLOBIN: 12.7 G/DL (ref 13.5–17.5)
MCH RBC QN AUTO: 29 PG (ref 26–34)
MCHC RBC AUTO-ENTMCNC: 35 G/DL (ref 31–36)
MCV RBC AUTO: 83 FL (ref 80–100)
PDW BLD-RTO: 13.9 % (ref 12.4–15.4)
PERFORMED ON: ABNORMAL
PERFORMED ON: NORMAL
PHOSPHORUS: 1.9 MG/DL (ref 2.5–4.9)
PLATELET # BLD: 220 K/UL (ref 135–450)
PMV BLD AUTO: 9.1 FL (ref 5–10.5)
POTASSIUM SERPL-SCNC: 4.3 MMOL/L (ref 3.5–5.1)
RBC # BLD: 4.37 M/UL (ref 4.2–5.9)
SODIUM BLD-SCNC: 140 MMOL/L (ref 136–145)
WBC # BLD: 9.1 K/UL (ref 4–11)

## 2018-07-26 PROCEDURE — 97110 THERAPEUTIC EXERCISES: CPT

## 2018-07-26 PROCEDURE — 2580000003 HC RX 258: Performed by: INTERNAL MEDICINE

## 2018-07-26 PROCEDURE — 6370000000 HC RX 637 (ALT 250 FOR IP): Performed by: INTERNAL MEDICINE

## 2018-07-26 PROCEDURE — 6370000000 HC RX 637 (ALT 250 FOR IP): Performed by: NURSE PRACTITIONER

## 2018-07-26 PROCEDURE — 99232 SBSQ HOSP IP/OBS MODERATE 35: CPT | Performed by: INTERNAL MEDICINE

## 2018-07-26 PROCEDURE — 80069 RENAL FUNCTION PANEL: CPT

## 2018-07-26 PROCEDURE — 6360000002 HC RX W HCPCS: Performed by: INTERNAL MEDICINE

## 2018-07-26 PROCEDURE — 97530 THERAPEUTIC ACTIVITIES: CPT

## 2018-07-26 PROCEDURE — 36415 COLL VENOUS BLD VENIPUNCTURE: CPT

## 2018-07-26 PROCEDURE — 1200000000 HC SEMI PRIVATE

## 2018-07-26 PROCEDURE — 97535 SELF CARE MNGMENT TRAINING: CPT

## 2018-07-26 PROCEDURE — 97116 GAIT TRAINING THERAPY: CPT

## 2018-07-26 PROCEDURE — 2500000003 HC RX 250 WO HCPCS: Performed by: INTERNAL MEDICINE

## 2018-07-26 PROCEDURE — 85027 COMPLETE CBC AUTOMATED: CPT

## 2018-07-26 PROCEDURE — C9113 INJ PANTOPRAZOLE SODIUM, VIA: HCPCS | Performed by: INTERNAL MEDICINE

## 2018-07-26 RX ORDER — CLOPIDOGREL BISULFATE 75 MG/1
75 TABLET ORAL DAILY
Status: DISCONTINUED | OUTPATIENT
Start: 2018-07-27 | End: 2018-07-28 | Stop reason: HOSPADM

## 2018-07-26 RX ORDER — ASPIRIN 81 MG/1
81 TABLET ORAL DAILY
Status: DISCONTINUED | OUTPATIENT
Start: 2018-07-27 | End: 2018-07-28 | Stop reason: HOSPADM

## 2018-07-26 RX ADMIN — INSULIN LISPRO 2 UNITS: 100 INJECTION, SOLUTION INTRAVENOUS; SUBCUTANEOUS at 16:44

## 2018-07-26 RX ADMIN — AMLODIPINE BESYLATE 10 MG: 5 TABLET ORAL at 09:04

## 2018-07-26 RX ADMIN — INSULIN LISPRO 2 UNITS: 100 INJECTION, SOLUTION INTRAVENOUS; SUBCUTANEOUS at 12:13

## 2018-07-26 RX ADMIN — INSULIN LISPRO 1 UNITS: 100 INJECTION, SOLUTION INTRAVENOUS; SUBCUTANEOUS at 21:43

## 2018-07-26 RX ADMIN — METOPROLOL SUCCINATE 25 MG: 25 TABLET, FILM COATED, EXTENDED RELEASE ORAL at 09:05

## 2018-07-26 RX ADMIN — PANTOPRAZOLE SODIUM 40 MG: 40 INJECTION, POWDER, FOR SOLUTION INTRAVENOUS at 09:05

## 2018-07-26 RX ADMIN — ATORVASTATIN CALCIUM 80 MG: 40 TABLET, FILM COATED ORAL at 21:43

## 2018-07-26 RX ADMIN — SODIUM PHOSPHATE, MONOBASIC, MONOHYDRATE 30 MMOL: 276; 142 INJECTION, SOLUTION INTRAVENOUS at 12:13

## 2018-07-26 RX ADMIN — MUPIROCIN: 20 OINTMENT TOPICAL at 09:16

## 2018-07-26 RX ADMIN — RANOLAZINE 1000 MG: 500 TABLET, FILM COATED, EXTENDED RELEASE ORAL at 09:05

## 2018-07-26 RX ADMIN — INSULIN GLARGINE 14 UNITS: 100 INJECTION, SOLUTION SUBCUTANEOUS at 21:50

## 2018-07-26 RX ADMIN — Medication 10 ML: at 21:43

## 2018-07-26 RX ADMIN — RANOLAZINE 1000 MG: 500 TABLET, FILM COATED, EXTENDED RELEASE ORAL at 21:43

## 2018-07-26 RX ADMIN — LAMOTRIGINE 25 MG: 25 TABLET ORAL at 09:04

## 2018-07-26 RX ADMIN — LEVOTHYROXINE SODIUM 150 MCG: 150 TABLET ORAL at 06:17

## 2018-07-26 RX ADMIN — FOLIC ACID 1 MG: 1 TABLET ORAL at 09:05

## 2018-07-26 RX ADMIN — Medication 10 ML: at 09:08

## 2018-07-26 ASSESSMENT — PAIN SCALES - GENERAL
PAINLEVEL_OUTOF10: 0
PAINLEVEL_OUTOF10: 0

## 2018-07-26 NOTE — PROGRESS NOTES
Inpatient Physical Therapy Daily Treatment Note    Unit: PCU   Date:  7/26/2018  Patient Name:    Navjot Mcfarlane  Admitting diagnosis:  Severe nausea and vomiting [R11.2]  Admit Date:  7/23/2018  Precautions/Restrictions:   Telemetry monitoring, IV, fall risk, bed/chair alarm, castellanos catheter    Discharge Recommendations: SNF  AM-PAC Score: 17  DME needs at discharge: Defer to facility     Treatment Time: 1885-5511  Treatment Number:  3    Subjective    Pt. Supine in bed with S.O. present. Pt. agreeable to therapy treatment this AM.  Multiple cues throughout for pt to maintain eyes open during treatment session. Responding to questions appropriately. Pt oriented to self, location, date    Pain    Denies    Bed Mobility   Supine to Sit: SBA  Sit to Supine: DNT, pt in chair  Rolling: DNT  Scooting: SBA to EOB    Transfer Training   Sit to stand: CGA  Stand to sit: CGA  Bed to Chair: DNT, pt ambulatory    Gait Training   Patient ambulated with RW and CGA x 110' demonstrating decreased alex, decreased step length bilaterally. Min improvement with VC to increase step length  Pt ambulated with no AD and CGA x 30' demonstrating increased unsteadiness, pt reaching for environmental supports within room. Recommend pt use RW at this time for ambulation. Therapeutic Exercise   Cervical flexion/extension: x 10  Scapular retraction: x 10    LAQ: x 10 bilaterally  Seated marches: x 10 bilaterally    VC for proper execution of exercises    Balance     Sitting: Good (-) at EOB, posterior lean noted requiring CGA  Standing: Good (-) with RW    Patient Education       Role of acute care PT, POC, encouraged sitting up in chair, safe sequencing with RW    Positioning Needs       Pt reclined in chair with alarm activated and needs/call light within reach.     Activity Tolerance     No issues with dizziness or shortness of breath  SpO2: >92% on RA throughout treatment    Other      Noted stat lock for castellanos catheter

## 2018-07-26 NOTE — PROGRESS NOTES
Shift report received from [facility]. Resting with eyes closed. respirs e/e, call light in reach.

## 2018-07-26 NOTE — PROGRESS NOTES
07/26/18   0424   WBC  10.7  8.6  9.1   HGB  11.6*  11.5*  12.7*   HCT  33.3*  33.0*  36.2*   MCV  83.0  83.3  83.0   PLT  199  198  220     BMP:   Recent Labs      07/24/18   0753  07/25/18   0511  07/26/18   0424   NA  135*  139  140   K  3.9  4.3  4.3   CL  102  108  108   CO2  21  21  21   PHOS  2.0*  1.5*  1.9*   BUN  13  9  9   CREATININE  1.2  0.9  0.9     LIVER PROFILE:   Recent Labs      07/24/18   0245   AST  19   ALT  11   BILITOT  0.8   ALKPHOS  86       Cultures:  one    Films:  CXR 7/23/18  The lungs are clear.      ASSESSMENT:  · Acute kidney failure  · Acute lithium intoxication with nausea and emesis  · Bipolar disorder  · Hashimoto's thyroiditis  · Chronic chest pain with known chronic occlusion of the LAD. · Obstructive Sleep Apnea on CPAP in past, AHI of 10  · Excessive daytime sleepiness      PLAN:  · Psychiatry recommendations noted: lamictal 25 TID, risperdal PRN, close f/u with Luc crespo  · CPAP titration after d/c, has h/o LAYO dx by HST with Dr. Ede Coreas.   My office is notified   · Okay with me to resume lovenox for DVT prophylaxis  · Call with questions

## 2018-07-27 LAB
ALBUMIN SERPL-MCNC: 3.5 G/DL (ref 3.4–5)
ANION GAP SERPL CALCULATED.3IONS-SCNC: 10 MMOL/L (ref 3–16)
BUN BLDV-MCNC: 8 MG/DL (ref 7–20)
CALCIUM SERPL-MCNC: 9.2 MG/DL (ref 8.3–10.6)
CHLORIDE BLD-SCNC: 110 MMOL/L (ref 99–110)
CO2: 21 MMOL/L (ref 21–32)
CREAT SERPL-MCNC: 0.8 MG/DL (ref 0.9–1.3)
GFR AFRICAN AMERICAN: >60
GFR NON-AFRICAN AMERICAN: >60
GLUCOSE BLD-MCNC: 100 MG/DL (ref 70–99)
GLUCOSE BLD-MCNC: 104 MG/DL (ref 70–99)
GLUCOSE BLD-MCNC: 112 MG/DL (ref 70–99)
GLUCOSE BLD-MCNC: 124 MG/DL (ref 70–99)
GLUCOSE BLD-MCNC: 260 MG/DL (ref 70–99)
GLUCOSE BLD-MCNC: 425 MG/DL (ref 70–99)
HCT VFR BLD CALC: 34.7 % (ref 40.5–52.5)
HEMOGLOBIN: 12.1 G/DL (ref 13.5–17.5)
MCH RBC QN AUTO: 29.1 PG (ref 26–34)
MCHC RBC AUTO-ENTMCNC: 34.9 G/DL (ref 31–36)
MCV RBC AUTO: 83.3 FL (ref 80–100)
PDW BLD-RTO: 13.9 % (ref 12.4–15.4)
PERFORMED ON: ABNORMAL
PHOSPHORUS: 2.4 MG/DL (ref 2.5–4.9)
PLATELET # BLD: 213 K/UL (ref 135–450)
PMV BLD AUTO: 8.7 FL (ref 5–10.5)
POTASSIUM SERPL-SCNC: 4.2 MMOL/L (ref 3.5–5.1)
RBC # BLD: 4.16 M/UL (ref 4.2–5.9)
SODIUM BLD-SCNC: 141 MMOL/L (ref 136–145)
WBC # BLD: 7.9 K/UL (ref 4–11)

## 2018-07-27 PROCEDURE — 6370000000 HC RX 637 (ALT 250 FOR IP): Performed by: INTERNAL MEDICINE

## 2018-07-27 PROCEDURE — 97110 THERAPEUTIC EXERCISES: CPT

## 2018-07-27 PROCEDURE — C9113 INJ PANTOPRAZOLE SODIUM, VIA: HCPCS | Performed by: INTERNAL MEDICINE

## 2018-07-27 PROCEDURE — 2580000003 HC RX 258: Performed by: INTERNAL MEDICINE

## 2018-07-27 PROCEDURE — 99232 SBSQ HOSP IP/OBS MODERATE 35: CPT | Performed by: INTERNAL MEDICINE

## 2018-07-27 PROCEDURE — 6370000000 HC RX 637 (ALT 250 FOR IP): Performed by: NURSE PRACTITIONER

## 2018-07-27 PROCEDURE — 1200000000 HC SEMI PRIVATE

## 2018-07-27 PROCEDURE — 6360000002 HC RX W HCPCS: Performed by: INTERNAL MEDICINE

## 2018-07-27 PROCEDURE — 97116 GAIT TRAINING THERAPY: CPT

## 2018-07-27 PROCEDURE — 85027 COMPLETE CBC AUTOMATED: CPT

## 2018-07-27 PROCEDURE — 80069 RENAL FUNCTION PANEL: CPT

## 2018-07-27 PROCEDURE — 36415 COLL VENOUS BLD VENIPUNCTURE: CPT

## 2018-07-27 RX ORDER — INSULIN GLARGINE 100 [IU]/ML
14 INJECTION, SOLUTION SUBCUTANEOUS NIGHTLY
Status: DISCONTINUED | OUTPATIENT
Start: 2018-07-27 | End: 2018-07-28 | Stop reason: HOSPADM

## 2018-07-27 RX ADMIN — INSULIN GLARGINE 14 UNITS: 100 INJECTION, SOLUTION SUBCUTANEOUS at 21:33

## 2018-07-27 RX ADMIN — PANTOPRAZOLE SODIUM 40 MG: 40 INJECTION, POWDER, FOR SOLUTION INTRAVENOUS at 11:11

## 2018-07-27 RX ADMIN — LEVOTHYROXINE SODIUM 150 MCG: 150 TABLET ORAL at 06:44

## 2018-07-27 RX ADMIN — ATORVASTATIN CALCIUM 80 MG: 40 TABLET, FILM COATED ORAL at 20:02

## 2018-07-27 RX ADMIN — AMLODIPINE BESYLATE 10 MG: 5 TABLET ORAL at 11:11

## 2018-07-27 RX ADMIN — RANOLAZINE 1000 MG: 500 TABLET, FILM COATED, EXTENDED RELEASE ORAL at 11:12

## 2018-07-27 RX ADMIN — RANOLAZINE 1000 MG: 500 TABLET, FILM COATED, EXTENDED RELEASE ORAL at 20:02

## 2018-07-27 RX ADMIN — ENOXAPARIN SODIUM 40 MG: 100 INJECTION SUBCUTANEOUS at 11:12

## 2018-07-27 RX ADMIN — FOLIC ACID 1 MG: 1 TABLET ORAL at 11:21

## 2018-07-27 RX ADMIN — INSULIN LISPRO 6 UNITS: 100 INJECTION, SOLUTION INTRAVENOUS; SUBCUTANEOUS at 11:26

## 2018-07-27 RX ADMIN — METOPROLOL SUCCINATE 25 MG: 25 TABLET, FILM COATED, EXTENDED RELEASE ORAL at 11:12

## 2018-07-27 RX ADMIN — ASPIRIN 81 MG: 81 TABLET, COATED ORAL at 11:12

## 2018-07-27 RX ADMIN — CLOPIDOGREL BISULFATE 75 MG: 75 TABLET ORAL at 11:11

## 2018-07-27 RX ADMIN — LAMOTRIGINE 25 MG: 25 TABLET ORAL at 11:12

## 2018-07-27 RX ADMIN — INSULIN LISPRO 6 UNITS: 100 INJECTION, SOLUTION INTRAVENOUS; SUBCUTANEOUS at 21:34

## 2018-07-27 RX ADMIN — Medication 10 ML: at 11:13

## 2018-07-27 NOTE — CARE COORDINATION
INTERDISCIPLINARY PLAN OF CARE CONFERENCE    Date/Time: 7/27/2018 5:03 PM  Completed by: Roshan Love, Case Management      Patient Name:  Gene Castro  YOB: 1970  Admitting Diagnosis: Severe nausea and vomiting [R11.2]     Admit Date/Time:  7/23/2018 12:05 AM    Chart reviewed. Interdisciplinary team met to discuss patient progress and discharge plans. Disciplines included Case Management, Nursing, and Dietitian. Current Status: Stable    Anticipated Discharge Date: TBD  Expected D/C Disposition:  Rehab  Confirmed plan with patient and/or family Yes  Discharge Plan Comments: Reviewed chart and met with pt and wife who would like to go to [facility] [place]. Discussed facilities and pt chooses [facility]. Referral called and faxed to [address] [facility] [place] at [facility]. Await return call on acceptance. [place] to follow.       Home O2 in place on admit: No  Pt informed of need to bring portable home O2 tank on day of discharge for nursing to connect prior to leaving:  Not Indicated  Verbalized agreement/Understanding:  Not Indicated

## 2018-07-27 NOTE — PROGRESS NOTES
PM shift assessment completed at this time. Eyes open on command and states that he is keeping them closed because they are tired. Alert and oriented; denies pain. Moves very slowly, able to use urinal independently.

## 2018-07-27 NOTE — PROGRESS NOTES
PM shift assessment completed at this time. Call light in reach. Resting quietly with eyes closed. Answers questions appropriately. States that he has not voided since earlier today.

## 2018-07-27 NOTE — PROGRESS NOTES
[facility]    Follow up Note           Reason for Consult: VOLODYMYR  Requesting Physician:  Dr. Glo Mercado    Chief Complaint:  N/v    Sub/interval history    Pt is more alert and awake this am     HD on  for 4h    Last 24 h uop 2.5 l    ROS: No chest pain/shortness of breath/fever/nausea/vomiting  PSFH: + visitor    Scheduled Meds:   insulin glargine  14 Units Subcutaneous Nightly    enoxaparin  40 mg Subcutaneous Daily    aspirin  81 mg Oral Daily    clopidogrel  75 mg Oral Daily    amLODIPine  10 mg Oral Daily    ranolazine  1,000 mg Oral BID    atorvastatin  80 mg Oral Nightly    folic acid  1 mg Oral Daily    metoprolol succinate  25 mg Oral Daily    pantoprazole  40 mg Intravenous Daily    sodium chloride flush  10 mL Intravenous 2 times per day    insulin lispro  0-12 Units Subcutaneous TID WC    insulin lispro  0-6 Units Subcutaneous Nightly    mupirocin   Nasal BID    levothyroxine  150 mcg Oral Daily    lamoTRIgine  25 mg Oral Daily     Continuous Infusions:   dextrose       PRN Meds:.nitroGLYCERIN, sodium chloride flush, glucose, dextrose, glucagon (rDNA), dextrose, prochlorperazine, hydrALAZINE, heparin (porcine)      History of Present Illness:    53 y.o. yo male with PMH of CAD, HTN Bipolar disorder, Hashimoto thyroiditis who is admitted for volodymyr and lithium toxicity  Pt had li level of 3 which decreased to 2.8. He is drowsy and can tell me the name and his  but cannot tell me today;s date or current president  His qtc is prolonged.  His uop seems to have picked up but cr is sluggish to improve; no seizure     Physical exam:   Constitutional:  VITALS:  /89   Pulse 67   Temp 98.9 °F (37.2 °C) (Oral)   Resp 16   Ht 6' (1.829 m)   Wt 199 lb 3.2 oz (90.4 kg)   SpO2 98%   BMI 27.02 kg/m²   Gen: alert, awake, nad  Skin: no rash, turgor wnl  Heent:  eomi, mmm  Neck: no bruits or jvd noted, thyroid normal  Cardiovascular:  S1, S2 without m/r/g  Respiratory:

## 2018-07-27 NOTE — PROGRESS NOTES
Hourly rounding completed. Patient verbalized no wants or needs at this time. Will continue to monitor.

## 2018-07-27 NOTE — PROGRESS NOTES
risperdal     Hypophosphatemia   - repleted    IDDM  - cont lantus and SSI    CAD with chronic chest pain  HTN  - ASA and plavix on hold with vas cath bleeding.   Can resume in a.m.   - Cont toprol xl, statin, ranexa    LAYO  - previously on CPAP    DVT Prophylaxis: SCD  Diet: Dietary Nutrition Supplements: Clear Liquid Oral Supplement  DIET CARB CONTROL;  Code Status: Full Code         Leticia Hook MD  7/27/2018 2:47 PM

## 2018-07-27 NOTE — PROGRESS NOTES
Nutrition Assessment    Type and Reason for Visit: Reassess    Malnutrition Assessment:  · Malnutrition Status: Meets the criteria for moderate malnutrition    Nutrition Diagnosis:   · Problem: Moderate malnutrition  · Etiology: Alteration in GI function, Nausea, Vomiting, Insufficient energy/nutrient consumption, Psychological cause/life stress, Renal dysfunction    Signs and symptoms: Patient report of, Diet history of poor intake, Intake 50-75%, Weight loss greater than or equal to 5% in 1 month, Lab values, GI abnormality, Nausea, Vomiting    Nutrition Assessment:  · Subjective Assessment: pt currently on PCU with a Carb Control diet in place and his intakes of meals have increased to > 75% . Attempted to visit but the pt and his guest were both sound asleep in the room. Nutrition Risk Level   Risk Level: Low    Nutrition Intervention  Food and/or Delivery: Continue current diet, Discontinue ONS  Nutrition Education/Counseling/Coordination of Care:  Continued Inpatient Monitoring, Coordination of Care, Coordination of Community Care    Patient assessed for nutrition risk. Deemed to be at low risk at this time. Will continue to follow patient.       Electronically signed by Gail Mata RD, DYLON on 7/27/18 at 3:38 PM    Contact Number: [phone]

## 2018-07-27 NOTE — PROGRESS NOTES
Patient moves very slowly as he is taking po medications; alert and oriented x 4. No problems swallowing pills. Denies pain. Will open eyes when asked; otherwise he is keeping them closed.   Voiding without diff per urinal.

## 2018-07-28 VITALS
DIASTOLIC BLOOD PRESSURE: 75 MMHG | HEIGHT: 72 IN | RESPIRATION RATE: 16 BRPM | SYSTOLIC BLOOD PRESSURE: 125 MMHG | BODY MASS INDEX: 26.98 KG/M2 | OXYGEN SATURATION: 96 % | WEIGHT: 199.2 LBS | HEART RATE: 68 BPM | TEMPERATURE: 99 F

## 2018-07-28 LAB
ALBUMIN SERPL-MCNC: 3.5 G/DL (ref 3.4–5)
ANION GAP SERPL CALCULATED.3IONS-SCNC: 10 MMOL/L (ref 3–16)
BUN BLDV-MCNC: 9 MG/DL (ref 7–20)
CALCIUM SERPL-MCNC: 9.4 MG/DL (ref 8.3–10.6)
CHLORIDE BLD-SCNC: 110 MMOL/L (ref 99–110)
CO2: 22 MMOL/L (ref 21–32)
CREAT SERPL-MCNC: 0.9 MG/DL (ref 0.9–1.3)
GFR AFRICAN AMERICAN: >60
GFR NON-AFRICAN AMERICAN: >60
GLUCOSE BLD-MCNC: 134 MG/DL (ref 70–99)
GLUCOSE BLD-MCNC: 136 MG/DL (ref 70–99)
GLUCOSE BLD-MCNC: 141 MG/DL (ref 70–99)
GLUCOSE BLD-MCNC: 143 MG/DL (ref 70–99)
GLUCOSE BLD-MCNC: 233 MG/DL (ref 70–99)
HCT VFR BLD CALC: 33.1 % (ref 40.5–52.5)
HEMOGLOBIN: 11.7 G/DL (ref 13.5–17.5)
MCH RBC QN AUTO: 29.2 PG (ref 26–34)
MCHC RBC AUTO-ENTMCNC: 35.2 G/DL (ref 31–36)
MCV RBC AUTO: 83.1 FL (ref 80–100)
PDW BLD-RTO: 13.8 % (ref 12.4–15.4)
PERFORMED ON: ABNORMAL
PHOSPHORUS: 2.6 MG/DL (ref 2.5–4.9)
PLATELET # BLD: 236 K/UL (ref 135–450)
PMV BLD AUTO: 9 FL (ref 5–10.5)
POTASSIUM SERPL-SCNC: 3.9 MMOL/L (ref 3.5–5.1)
RBC # BLD: 3.99 M/UL (ref 4.2–5.9)
SODIUM BLD-SCNC: 142 MMOL/L (ref 136–145)
WBC # BLD: 8.5 K/UL (ref 4–11)

## 2018-07-28 PROCEDURE — 6370000000 HC RX 637 (ALT 250 FOR IP): Performed by: INTERNAL MEDICINE

## 2018-07-28 PROCEDURE — 97535 SELF CARE MNGMENT TRAINING: CPT

## 2018-07-28 PROCEDURE — 2580000003 HC RX 258: Performed by: INTERNAL MEDICINE

## 2018-07-28 PROCEDURE — C9113 INJ PANTOPRAZOLE SODIUM, VIA: HCPCS | Performed by: INTERNAL MEDICINE

## 2018-07-28 PROCEDURE — 6360000002 HC RX W HCPCS: Performed by: INTERNAL MEDICINE

## 2018-07-28 PROCEDURE — 6370000000 HC RX 637 (ALT 250 FOR IP): Performed by: NURSE PRACTITIONER

## 2018-07-28 PROCEDURE — 36415 COLL VENOUS BLD VENIPUNCTURE: CPT

## 2018-07-28 PROCEDURE — 97530 THERAPEUTIC ACTIVITIES: CPT

## 2018-07-28 PROCEDURE — 80069 RENAL FUNCTION PANEL: CPT

## 2018-07-28 PROCEDURE — 85027 COMPLETE CBC AUTOMATED: CPT

## 2018-07-28 RX ORDER — AMLODIPINE BESYLATE 10 MG/1
10 TABLET ORAL DAILY
Qty: 30 TABLET | Refills: 3 | Status: ON HOLD | OUTPATIENT
Start: 2018-07-29 | End: 2018-08-24 | Stop reason: HOSPADM

## 2018-07-28 RX ORDER — LAMOTRIGINE 25 MG/1
TABLET ORAL
Qty: 60 TABLET | Refills: 3 | Status: ON HOLD | OUTPATIENT
Start: 2018-07-28 | End: 2018-08-24

## 2018-07-28 RX ADMIN — INSULIN LISPRO 2 UNITS: 100 INJECTION, SOLUTION INTRAVENOUS; SUBCUTANEOUS at 17:14

## 2018-07-28 RX ADMIN — LAMOTRIGINE 25 MG: 25 TABLET ORAL at 09:29

## 2018-07-28 RX ADMIN — ENOXAPARIN SODIUM 40 MG: 100 INJECTION SUBCUTANEOUS at 09:29

## 2018-07-28 RX ADMIN — LEVOTHYROXINE SODIUM 150 MCG: 150 TABLET ORAL at 06:09

## 2018-07-28 RX ADMIN — AMLODIPINE BESYLATE 10 MG: 5 TABLET ORAL at 09:29

## 2018-07-28 RX ADMIN — RANOLAZINE 1000 MG: 500 TABLET, FILM COATED, EXTENDED RELEASE ORAL at 09:28

## 2018-07-28 RX ADMIN — Medication 10 ML: at 09:29

## 2018-07-28 RX ADMIN — CLOPIDOGREL BISULFATE 75 MG: 75 TABLET ORAL at 09:29

## 2018-07-28 RX ADMIN — ASPIRIN 81 MG: 81 TABLET, COATED ORAL at 09:29

## 2018-07-28 RX ADMIN — INSULIN LISPRO 4 UNITS: 100 INJECTION, SOLUTION INTRAVENOUS; SUBCUTANEOUS at 11:58

## 2018-07-28 RX ADMIN — FOLIC ACID 1 MG: 1 TABLET ORAL at 09:29

## 2018-07-28 RX ADMIN — METOPROLOL SUCCINATE 25 MG: 25 TABLET, FILM COATED, EXTENDED RELEASE ORAL at 09:28

## 2018-07-28 RX ADMIN — PANTOPRAZOLE SODIUM 40 MG: 40 INJECTION, POWDER, FOR SOLUTION INTRAVENOUS at 09:29

## 2018-07-28 NOTE — PLAN OF CARE
Problem: Falls - Risk of:  Goal: Will remain free from falls  Will remain free from falls   Outcome: Ongoing    Goal: Absence of physical injury  Absence of physical injury   Outcome: Ongoing      Problem: Infection:  Goal: Will remain free from infection  Will remain free from infection   Outcome: Ongoing      Problem: Safety:  Goal: Free from accidental physical injury  Free from accidental physical injury   Outcome: Ongoing      Problem: Daily Care:  Goal: Daily care needs are met  Daily care needs are met   Outcome: Ongoing      Problem: Skin Integrity:  Goal: Skin integrity will stabilize  Skin integrity will stabilize   Outcome: Ongoing

## 2018-07-28 NOTE — CARE COORDINATION
INTERDISCIPLINARY PLAN OF CARE CONFERENCE    Date/Time: 2018 10:54 AM  Completed by: Aeljandro Paige Management      Patient Name:  Ignacio Pantoja  YOB: 1970  Admitting Diagnosis: Severe nausea and vomiting [R11.2]     Admit Date/Time:  2018 12:05 AM    Chart reviewed. Interdisciplinary team met to discuss patient progress and discharge plans. Disciplines included Case Management, Nursing, and Dietitian. Current Status:ongoing    Anticipated Discharge Date: tba  Expected D/C Disposition:  Skilled nursing facility  Confirmed plan with patient and/or family Yes  Discharge Plan Comments: Reviewed chart. Role of discharge planner explained and patient verbalized understanding. Pt plans to go to [facility] for SNF. A referral was called into [facility] last night (18) per CM notes. CM called and left a voicemail with [facility] at [phone] to inquire if able to accept. CM left a voicemail with Yvette Johnson with [employer] to inquire if this was the correct voicemail. CM called main number, and spoke with staff and they were unable to give my [facility]'s number. CM attempted to call main number to [facility] and they forwarded CM to  3 times and it just rang with no answer. CM also left a voicemail on the [employer] at [phone]. CM re-called the main number at [facility] ([phone]) and spoke witht he nursing supervisor, Mckayla Welch, and she stated that she would text Jacklyn Simms and also call her DON and would let CM know if able to accept pt. Awaiting call back from [facility] to let CM know if able to accept pt.     1200pm 18: Received call back from [facility] at [facility] and she states that they are able to accept pt today, +bed. DISCHARGE ORDER  Date/Time 2018 12:14 PM  Completed by: Alejandro Paige    Patient Name: Ignacio Pantoja    : 1970  Admitting Diagnosis: Severe nausea and vomiting [R11.2]  Admit Date/Time: 2018 12:05 AM    Noted discharge order. Confirmed discharge plan with patient / family (pt and his domestic partner, Bita Acosta, who is at bedside): Yes   Discharge Plan: Reviewed chart. Role of discharge planner explained and patient and Bita Acosta verbalized understanding. Discharge order is noted. Pt is being d/c'd to [facility] today at [phone] via [facility]. Pt's O2 sats are 97% on RA. Faxed PIPO/AVS/HENS to [phone]. Then per Eduard Mckenna with [facility] request, faxed HENS to [phone]. No further discharge needs needed or noted. Discharge orders and Continuity of Care faxed to facility: Yes  Hospital Exemption Notification System complete: Yes  Transportation arranged: Yes - [address] @ 1730. Patient / Family (pt and Bita Acosta) aware of  time: Yes   Nursing aware of  time: Yes, Narinder Monroy, RN  Receiving facility aware of  time: Yes, Annetta Cartagena  Pre-cert obtained?   No

## 2018-07-28 NOTE — PROGRESS NOTES
Occupational Therapy Daily Treatment Note  Unit: PCU    Date:  7/28/2018  Patient Name:    Guerda Garcia  Admitting diagnosis:  Severe nausea and vomiting [R11.2]  Admit Date:  7/23/2018  Precautions/Restrictions:  Fall risk, bed/chair alarm, IV, R vas cath, IV    Discharge Recommendations: SNF  AM-PAC Score: 15  DME needs at discharge:  Possible RW- the RW at home is the pts wifes walker    Treatment Time:  8803-4195  Treatment number: 4     Subjective:  Pt in the bed with family present. Pain:  No indications of pain    Bed Mobility:   Supine to Sit: SBA with Head of bed lowered  Sit to Supine: SBA  Scooting:SBA    Transfer Training:   Sit to stand:CGA  Stand to sit:CGA  Bed to Chair/BSC:CGA   Functional mobility to and from the bathroom with RW with CGA    ADL Training:   Pt able to provide bowel hygiene while seated. Pt CGA with functional mobility and transfers with RW. Pt stood at sink to wash hands with CGA. Therapeutic Exercise:   NT    Patient Education:   Role of OT, OOB, ADL     Positioning Needs: In the bed laying on the rt side with railing up and alarm on    Family Present:   Wife and family present    Assessment:   Pt performs at the above deficits. Pt continues to function below baseline. Pt requires increased time to complete task. Plan: cont with POC    At end of treatment, patient in bed with call light and needs within reach.   Timed Code Treatment Minutes:  23  minutes  Total Treatment Time: 23  minutes    Signature, License #    KIM Petit [id number]

## 2018-07-28 NOTE — PROGRESS NOTES
[facility]    Follow up Note           Reason for Consult: VOLODYMYR  Requesting Physician:  Dr. Raymond Lobato    Chief Complaint:  N/v    Sub/interval history    The patient was seen and examined; he was sleepy today with no acute events noted overnight. ROS: No fever or chills. Social: Family at bedside. Scheduled Meds:   insulin glargine  14 Units Subcutaneous Nightly    enoxaparin  40 mg Subcutaneous Daily    aspirin  81 mg Oral Daily    clopidogrel  75 mg Oral Daily    amLODIPine  10 mg Oral Daily    ranolazine  1,000 mg Oral BID    atorvastatin  80 mg Oral Nightly    folic acid  1 mg Oral Daily    metoprolol succinate  25 mg Oral Daily    pantoprazole  40 mg Intravenous Daily    sodium chloride flush  10 mL Intravenous 2 times per day    insulin lispro  0-12 Units Subcutaneous TID WC    insulin lispro  0-6 Units Subcutaneous Nightly    levothyroxine  150 mcg Oral Daily    lamoTRIgine  25 mg Oral Daily     Continuous Infusions:   dextrose       PRN Meds:.nitroGLYCERIN, sodium chloride flush, glucose, dextrose, glucagon (rDNA), dextrose, prochlorperazine, hydrALAZINE, heparin (porcine)      History of Present Illness:    53 y.o. yo male with PMH of CAD, HTN Bipolar disorder, Hashimoto thyroiditis who is admitted for volodymyr and lithium toxicity  Pt had li level of 3 which decreased to 2.8. He is drowsy and can tell me the name and his  but cannot tell me today;s date or current president  His qtc is prolonged.  His uop seems to have picked up but cr is sluggish to improve; no seizure     Physical exam:   Constitutional:  VITALS:  /75   Pulse 68   Temp 99 °F (37.2 °C) (Oral)   Resp 16   Ht 6' (1.829 m)   Wt 199 lb 3.2 oz (90.4 kg)   SpO2 96%   BMI 27.02 kg/m²      Gen: alert, awake, nad  Neck: no bruits or jvd noted, thyroid normal  Cardiovascular:  S1, S2 without m/r/g  Respiratory: CTA B without w/r/r; respiratory effort normal  Abdomen:  +bs, soft, nt, nd, no hepatosplenomegaly  Ext: no lower extremity edema    Data/  Recent Labs      07/26/18   0424  07/27/18   0526  07/28/18   0537   WBC  9.1  7.9  8.5   HGB  12.7*  12.1*  11.7*   HCT  36.2*  34.7*  33.1*   MCV  83.0  83.3  83.1   PLT  220  213  236     Recent Labs      07/26/18   0424  07/27/18   0526  07/28/18   0537   NA  140  141  142   K  4.3  4.2  3.9   CL  108  110  110   CO2  21  21  22   GLUCOSE  116*  112*  143*   PHOS  1.9*  2.4*  2.6   BUN  9  8  9   CREATININE  0.9  0.8*  0.9   LABGLOM  >60  >60  >60   GFRAA  >60  >60  >60     UA w 30 protein and 3-5 wbcs  Lithium level 0.8 on 7/25 ( peak was 3)    Assessment  -VOLODYMYR, ATN from lithium toxicity, resolved   -Lithium toxicity  -Acute metabolic encephalopathy  -HTN    Plan    We will sign off the case for now, please call us back with any questions.     Suhail Acevedo

## 2018-07-28 NOTE — FLOWSHEET NOTE
07/27/18 2002   Vital Signs   Temp 98.8 °F (37.1 °C)   Temp Source Oral   Pulse 82   Resp 16   BP (!) 145/86   BP Location Left upper arm   Level of Consciousness 0   MEWS Score 1   Oxygen Therapy   SpO2 99 %   O2 Device None (Room air)   Pt sleepy, woke up for PM oral medications.  Leeroy Chapin

## 2018-07-28 NOTE — FLOWSHEET NOTE
07/28/18 0352   Vital Signs   Temp 99.4 °F (37.4 °C)   Temp Source Oral   Pulse 67   Heart Rate Source Monitor   Resp 16   /85   BP Location Left upper arm   Patient Position Semi fowlers   Level of Consciousness 0   MEWS Score 1   Oxygen Therapy   SpO2 96 %   O2 Device None (Room air)   pt resting in bed, no acute distress.  González Arellano

## 2018-07-30 ENCOUNTER — TELEPHONE (OUTPATIENT)
Dept: CARDIOLOGY CLINIC | Age: 48
End: 2018-07-30

## 2018-07-30 NOTE — TELEPHONE ENCOUNTER
Dr. Trey Pedro, patient is scheduled for a second attempt at a  procedure on 8/14/18. He was recently discharged from the hospital and and is currently at [facility]. Would you like to reschedule procedure?

## 2018-07-31 NOTE — TELEPHONE ENCOUNTER
EBONY for Ted Flores informing her we will keep procedure on the schedule. Asked for her to return call if she had any questions.

## 2018-08-07 ENCOUNTER — TELEPHONE (OUTPATIENT)
Dept: PULMONOLOGY | Age: 48
End: 2018-08-07

## 2018-08-14 ENCOUNTER — HOSPITAL ENCOUNTER (OUTPATIENT)
Dept: CARDIAC CATH/INVASIVE PROCEDURES | Age: 48
Discharge: HOME | End: 2018-08-15
Attending: INTERNAL MEDICINE | Admitting: INTERNAL MEDICINE
Payer: MEDICARE

## 2018-08-14 LAB
ANION GAP SERPL CALCULATED.3IONS-SCNC: 10 MMOL/L (ref 3–16)
BUN BLDV-MCNC: 23 MG/DL (ref 7–20)
CALCIUM SERPL-MCNC: 9.7 MG/DL (ref 8.3–10.6)
CHLORIDE BLD-SCNC: 101 MMOL/L (ref 99–110)
CO2: 28 MMOL/L (ref 21–32)
CREAT SERPL-MCNC: 1 MG/DL (ref 0.9–1.3)
GFR AFRICAN AMERICAN: >60
GFR NON-AFRICAN AMERICAN: >60
GLUCOSE BLD-MCNC: 116 MG/DL (ref 70–99)
GLUCOSE BLD-MCNC: 97 MG/DL (ref 70–99)
HCT VFR BLD CALC: 35.1 % (ref 40.5–52.5)
HEMOGLOBIN: 12.1 G/DL (ref 13.5–17.5)
INR BLD: 1.05 (ref 0.86–1.14)
MCH RBC QN AUTO: 29.4 PG (ref 26–34)
MCHC RBC AUTO-ENTMCNC: 34.5 G/DL (ref 31–36)
MCV RBC AUTO: 85.3 FL (ref 80–100)
PDW BLD-RTO: 14.5 % (ref 12.4–15.4)
PERFORMED ON: NORMAL
PLATELET # BLD: 219 K/UL (ref 135–450)
PMV BLD AUTO: 8.1 FL (ref 5–10.5)
POC ACT LR: >400 SEC
POTASSIUM SERPL-SCNC: 4.7 MMOL/L (ref 3.5–5.1)
PROTHROMBIN TIME: 12 SEC (ref 9.8–13)
RBC # BLD: 4.11 M/UL (ref 4.2–5.9)
SODIUM BLD-SCNC: 139 MMOL/L (ref 136–145)
WBC # BLD: 7.6 K/UL (ref 4–11)

## 2018-08-14 PROCEDURE — 80048 BASIC METABOLIC PNL TOTAL CA: CPT

## 2018-08-14 PROCEDURE — 92943 PRQ TRLUML REVSC CH OCC ANT: CPT | Performed by: INTERNAL MEDICINE

## 2018-08-14 PROCEDURE — G0378 HOSPITAL OBSERVATION PER HR: HCPCS

## 2018-08-14 PROCEDURE — C1769 GUIDE WIRE: HCPCS

## 2018-08-14 PROCEDURE — C1760 CLOSURE DEV, VASC: HCPCS

## 2018-08-14 PROCEDURE — 2580000003 HC RX 258: Performed by: INTERNAL MEDICINE

## 2018-08-14 PROCEDURE — C1894 INTRO/SHEATH, NON-LASER: HCPCS

## 2018-08-14 PROCEDURE — 6360000002 HC RX W HCPCS

## 2018-08-14 PROCEDURE — 2709999900 HC NON-CHARGEABLE SUPPLY

## 2018-08-14 PROCEDURE — 6370000000 HC RX 637 (ALT 250 FOR IP): Performed by: INTERNAL MEDICINE

## 2018-08-14 PROCEDURE — 92943 PRQ TRLUML REVSC CH OCC ANT: CPT

## 2018-08-14 PROCEDURE — 85610 PROTHROMBIN TIME: CPT

## 2018-08-14 PROCEDURE — 2580000003 HC RX 258

## 2018-08-14 PROCEDURE — 6360000004 HC RX CONTRAST MEDICATION: Performed by: INTERNAL MEDICINE

## 2018-08-14 PROCEDURE — 85027 COMPLETE CBC AUTOMATED: CPT

## 2018-08-14 PROCEDURE — 93010 ELECTROCARDIOGRAM REPORT: CPT | Performed by: INTERNAL MEDICINE

## 2018-08-14 PROCEDURE — 99152 MOD SED SAME PHYS/QHP 5/>YRS: CPT

## 2018-08-14 PROCEDURE — C1887 CATHETER, GUIDING: HCPCS

## 2018-08-14 PROCEDURE — 99153 MOD SED SAME PHYS/QHP EA: CPT

## 2018-08-14 PROCEDURE — 93005 ELECTROCARDIOGRAM TRACING: CPT | Performed by: INTERNAL MEDICINE

## 2018-08-14 PROCEDURE — 85347 COAGULATION TIME ACTIVATED: CPT

## 2018-08-14 PROCEDURE — C1725 CATH, TRANSLUMIN NON-LASER: HCPCS

## 2018-08-14 PROCEDURE — 6370000000 HC RX 637 (ALT 250 FOR IP)

## 2018-08-14 RX ORDER — DEXTROSE MONOHYDRATE 25 G/50ML
12.5 INJECTION, SOLUTION INTRAVENOUS PRN
Status: DISCONTINUED | OUTPATIENT
Start: 2018-08-14 | End: 2018-08-15 | Stop reason: HOSPADM

## 2018-08-14 RX ORDER — DEXTROSE MONOHYDRATE 50 MG/ML
100 INJECTION, SOLUTION INTRAVENOUS PRN
Status: DISCONTINUED | OUTPATIENT
Start: 2018-08-14 | End: 2018-08-15 | Stop reason: HOSPADM

## 2018-08-14 RX ORDER — ALOGLIPTIN AND METFORMIN HYDROCHLORIDE 12.5; 1 MG/1; MG/1
1 TABLET, FILM COATED ORAL 2 TIMES DAILY
Status: DISCONTINUED | OUTPATIENT
Start: 2018-08-14 | End: 2018-08-14 | Stop reason: CLARIF

## 2018-08-14 RX ORDER — NICOTINE POLACRILEX 4 MG
15 LOZENGE BUCCAL PRN
Status: DISCONTINUED | OUTPATIENT
Start: 2018-08-14 | End: 2018-08-15 | Stop reason: HOSPADM

## 2018-08-14 RX ORDER — ACETAMINOPHEN 325 MG/1
650 TABLET ORAL EVERY 4 HOURS PRN
Status: DISCONTINUED | OUTPATIENT
Start: 2018-08-14 | End: 2018-08-15 | Stop reason: HOSPADM

## 2018-08-14 RX ORDER — ONDANSETRON 2 MG/ML
4 INJECTION INTRAMUSCULAR; INTRAVENOUS EVERY 6 HOURS PRN
Status: DISCONTINUED | OUTPATIENT
Start: 2018-08-14 | End: 2018-08-15 | Stop reason: HOSPADM

## 2018-08-14 RX ORDER — SODIUM CHLORIDE 0.9 % (FLUSH) 0.9 %
10 SYRINGE (ML) INJECTION PRN
Status: DISCONTINUED | OUTPATIENT
Start: 2018-08-14 | End: 2018-08-15 | Stop reason: HOSPADM

## 2018-08-14 RX ORDER — ASPIRIN 81 MG/1
81 TABLET ORAL DAILY
Status: DISCONTINUED | OUTPATIENT
Start: 2018-08-15 | End: 2018-08-15 | Stop reason: HOSPADM

## 2018-08-14 RX ORDER — LAMOTRIGINE 25 MG/1
25 TABLET ORAL DAILY
Status: DISCONTINUED | OUTPATIENT
Start: 2018-08-15 | End: 2018-08-15 | Stop reason: HOSPADM

## 2018-08-14 RX ORDER — SODIUM CHLORIDE 9 MG/ML
1000 INJECTION, SOLUTION INTRAVENOUS CONTINUOUS
Status: DISCONTINUED | OUTPATIENT
Start: 2018-08-14 | End: 2018-08-15 | Stop reason: HOSPADM

## 2018-08-14 RX ORDER — SODIUM CHLORIDE 0.9 % (FLUSH) 0.9 %
10 SYRINGE (ML) INJECTION EVERY 12 HOURS SCHEDULED
Status: DISCONTINUED | OUTPATIENT
Start: 2018-08-14 | End: 2018-08-15 | Stop reason: HOSPADM

## 2018-08-14 RX ORDER — ISOSORBIDE MONONITRATE 60 MG/1
60 TABLET, EXTENDED RELEASE ORAL DAILY
Status: DISCONTINUED | OUTPATIENT
Start: 2018-08-14 | End: 2018-08-15 | Stop reason: HOSPADM

## 2018-08-14 RX ORDER — METOPROLOL SUCCINATE 25 MG/1
25 TABLET, EXTENDED RELEASE ORAL DAILY
Status: DISCONTINUED | OUTPATIENT
Start: 2018-08-14 | End: 2018-08-15 | Stop reason: HOSPADM

## 2018-08-14 RX ORDER — AMLODIPINE BESYLATE 5 MG/1
10 TABLET ORAL DAILY
Status: DISCONTINUED | OUTPATIENT
Start: 2018-08-15 | End: 2018-08-15 | Stop reason: HOSPADM

## 2018-08-14 RX ORDER — ASPIRIN 81 MG/1
243 TABLET, CHEWABLE ORAL ONCE
Status: COMPLETED | OUTPATIENT
Start: 2018-08-14 | End: 2018-08-14

## 2018-08-14 RX ORDER — FOLIC ACID 1 MG/1
1 TABLET ORAL DAILY
Status: DISCONTINUED | OUTPATIENT
Start: 2018-08-14 | End: 2018-08-15 | Stop reason: HOSPADM

## 2018-08-14 RX ORDER — LEVOTHYROXINE SODIUM 0.15 MG/1
150 TABLET ORAL NIGHTLY
Status: DISCONTINUED | OUTPATIENT
Start: 2018-08-14 | End: 2018-08-15 | Stop reason: HOSPADM

## 2018-08-14 RX ORDER — FAMOTIDINE 20 MG/1
20 TABLET, FILM COATED ORAL 2 TIMES DAILY
Status: DISCONTINUED | OUTPATIENT
Start: 2018-08-14 | End: 2018-08-15 | Stop reason: HOSPADM

## 2018-08-14 RX ORDER — OMEGA-3-ACID ETHYL ESTERS 1 G/1
1 CAPSULE, LIQUID FILLED ORAL DAILY
Status: DISCONTINUED | OUTPATIENT
Start: 2018-08-15 | End: 2018-08-15 | Stop reason: HOSPADM

## 2018-08-14 RX ORDER — RANOLAZINE 500 MG/1
1000 TABLET, EXTENDED RELEASE ORAL 2 TIMES DAILY
Status: DISCONTINUED | OUTPATIENT
Start: 2018-08-14 | End: 2018-08-15 | Stop reason: HOSPADM

## 2018-08-14 RX ORDER — NITROGLYCERIN 0.4 MG/1
0.4 TABLET SUBLINGUAL EVERY 5 MIN PRN
Status: DISCONTINUED | OUTPATIENT
Start: 2018-08-14 | End: 2018-08-15 | Stop reason: HOSPADM

## 2018-08-14 RX ORDER — AMPICILLIN TRIHYDRATE 250 MG
1 CAPSULE ORAL DAILY
Status: DISCONTINUED | OUTPATIENT
Start: 2018-08-14 | End: 2018-08-14 | Stop reason: RX

## 2018-08-14 RX ORDER — LISINOPRIL 10 MG/1
10 TABLET ORAL DAILY
Status: DISCONTINUED | OUTPATIENT
Start: 2018-08-15 | End: 2018-08-15 | Stop reason: HOSPADM

## 2018-08-14 RX ORDER — LISINOPRIL 10 MG/1
10 TABLET ORAL DAILY
Status: ON HOLD | COMMUNITY
End: 2018-08-24 | Stop reason: HOSPADM

## 2018-08-14 RX ORDER — ATORVASTATIN CALCIUM 80 MG/1
80 TABLET, FILM COATED ORAL NIGHTLY
Status: DISCONTINUED | OUTPATIENT
Start: 2018-08-14 | End: 2018-08-15 | Stop reason: HOSPADM

## 2018-08-14 RX ADMIN — ASPIRIN 243 MG: 81 TABLET, CHEWABLE ORAL at 08:53

## 2018-08-14 RX ADMIN — IOVERSOL 325 ML: 741 INJECTION INTRA-ARTERIAL; INTRAVENOUS at 11:31

## 2018-08-14 RX ADMIN — SODIUM CHLORIDE 1000 ML: 9 INJECTION, SOLUTION INTRAVENOUS at 21:44

## 2018-08-14 RX ADMIN — SODIUM CHLORIDE, PRESERVATIVE FREE 10 ML: 5 INJECTION INTRAVENOUS at 21:44

## 2018-08-14 RX ADMIN — RANOLAZINE 1000 MG: 500 TABLET, FILM COATED, EXTENDED RELEASE ORAL at 21:42

## 2018-08-14 RX ADMIN — SODIUM CHLORIDE 1000 ML: 9 INJECTION, SOLUTION INTRAVENOUS at 07:40

## 2018-08-14 RX ADMIN — FOLIC ACID 1 MG: 1 TABLET ORAL at 21:42

## 2018-08-14 RX ADMIN — METOPROLOL SUCCINATE 25 MG: 25 TABLET, EXTENDED RELEASE ORAL at 21:42

## 2018-08-14 RX ADMIN — ATORVASTATIN CALCIUM 80 MG: 80 TABLET, FILM COATED ORAL at 21:42

## 2018-08-14 RX ADMIN — ISOSORBIDE MONONITRATE 60 MG: 60 TABLET, EXTENDED RELEASE ORAL at 21:42

## 2018-08-14 RX ADMIN — FAMOTIDINE 20 MG: 20 TABLET ORAL at 21:42

## 2018-08-14 RX ADMIN — LEVOTHYROXINE SODIUM 150 MCG: 150 TABLET ORAL at 21:42

## 2018-08-14 NOTE — PROCEDURES
disrupt the cap with a  2.5 mm compliant balloon. Different wires were then advanced including    200, Fielder XT and Fighter to use parallel-wire technique to get into the   true lumen. However, it did not work and we remained subintimal. At this   point due to no visualization of the distal LAD with both antegrade and   retrograde injections, we aborted the procedure. Both guides were removed. Femoral angiography revealed that arterial access suitable for closure and  we used Perclose without any complications. IMPRESSION:  Chronic total occlusion () of mid LAD. We were able to cross  the cap but remained subintimal. Distal LAD was not seen with both antegrade   and retrograde injections suggestive of another  of distal LAD. No  distal visualization of distal LAD did not help us in getting access into the   true lumen. RECOMMENDATION:  This was a second PCI attempt on this mid LAD . He also   has a distal LAD  and due to that the distal LAD was not seen   angiographically. Considering his disabling angina, will refer him for   single-vessel LIMA to LAD bypass graft. Vishnu Hernández MD      D: 08/14/2018 16:02:08       T: 08/14/2018 17:39:06     /[email or web address]  Job#: [id number]     Doc#: [id number]    CC:     Richa Saldana CNP

## 2018-08-14 NOTE — CONSULTS
Department of Cardiovascular & Thoracic Surgery  History and Physical          DIAGNOSIS:  Coronary Artery Disease    CHIEF COMPLAINT:  Angina, Class VI,  of native artery    History Obtained From:  patient, domestic partner    HISTORY OF PRESENT ILLNESS:      The patient is a 53 y.o. male with significant past medical history of CAD - chronic occlusion of LAD, Bipolar/schizophrenia (Lithium), T2DM, VOLODYMYR, Hashimoto's thyroiditis, LAYO (CPAP) and recent lithium intoxication following nausea/vomtining who presents for a scheduled C with Dr Maranda Singh. The patient underwent an coronary angioplasty (5/2018) and catheterization (3/2018) and an investment angioplasty was performed. A repeat attempt to revascularize the  today was unsuccessful and he was referred for surgical revascularization. He has been on Plavix - last dose was 8/14/18    Past Medical History:        Diagnosis Date    Bipolar affective ([address]     CAD (coronary artery disease) 04/2018     of mLAD    Depression     [facility] mellitus [facility]     Hashimoto disease     Hyperlipidemia     Hypertension     Schizophrenia ([facility] [address]     Sleep apnea      Past Surgical History:        Procedure Laterality Date    CARDIAC CATHETERIZATION  03/22/2018    100% occlusion of the mLAD    CORONARY ANGIOPLASTY  05/29/2018    POBA of LAD     Medications Prior to Admission:   Prescriptions Prior to Admission: lisinopril (PRINIVIL;ZESTRIL) 10 MG tablet, Take 10 mg by mouth daily  Chromium 200 MCG CAPS, Take 1 mg by mouth  lamoTRIgine (LAMICTAL) 25 MG tablet, Take 25 mgs PO Qdaily for 10 days, then take 50mgs PO Qdaily for next 14 days, then take 75 mgs PO Qdaily for next 14 days, then take 100mgs PO Qdaily for next 14 days, then take 125mgs PO Qdaily for next 14 days, then take 150mgs PO Qdaily.   alogliptin-metformin (KAZANO) 12.5-1000 MG TABS, Take 1 capsule by mouth 2 times daily  insulin aspart (NOVOLOG) 100 UNIT/ML injection vial, Inject 8 Units into the skin as

## 2018-08-15 VITALS
RESPIRATION RATE: 16 BRPM | SYSTOLIC BLOOD PRESSURE: 113 MMHG | DIASTOLIC BLOOD PRESSURE: 67 MMHG | HEART RATE: 55 BPM | OXYGEN SATURATION: 97 % | WEIGHT: 199.2 LBS | HEIGHT: 72 IN | BODY MASS INDEX: 26.98 KG/M2 | TEMPERATURE: 97.8 F

## 2018-08-15 PROBLEM — E44.0 MODERATE MALNUTRITION (HCC): Chronic | Status: ACTIVE | Noted: 2018-08-15

## 2018-08-15 LAB
ANION GAP SERPL CALCULATED.3IONS-SCNC: 7 MMOL/L (ref 3–16)
BILIRUBIN URINE: NEGATIVE
BLOOD, URINE: NEGATIVE
BUN BLDV-MCNC: 16 MG/DL (ref 7–20)
CALCIUM SERPL-MCNC: 8.9 MG/DL (ref 8.3–10.6)
CHLORIDE BLD-SCNC: 106 MMOL/L (ref 99–110)
CLARITY: CLEAR
CO2: 29 MMOL/L (ref 21–32)
COLOR: YELLOW
CREAT SERPL-MCNC: 1 MG/DL (ref 0.9–1.3)
GFR AFRICAN AMERICAN: >60
GFR NON-AFRICAN AMERICAN: >60
GLUCOSE BLD-MCNC: 113 MG/DL (ref 70–99)
GLUCOSE BLD-MCNC: 131 MG/DL (ref 70–99)
GLUCOSE BLD-MCNC: 160 MG/DL (ref 70–99)
GLUCOSE BLD-MCNC: 164 MG/DL (ref 70–99)
GLUCOSE BLD-MCNC: 165 MG/DL (ref 70–99)
GLUCOSE URINE: NEGATIVE MG/DL
KETONES, URINE: NEGATIVE MG/DL
LEUKOCYTE ESTERASE, URINE: NEGATIVE
MICROSCOPIC EXAMINATION: NORMAL
NITRITE, URINE: NEGATIVE
PERFORMED ON: ABNORMAL
PH UA: 6
POTASSIUM REFLEX MAGNESIUM: 4.5 MMOL/L (ref 3.5–5.1)
PROTEIN UA: NEGATIVE MG/DL
SODIUM BLD-SCNC: 142 MMOL/L (ref 136–145)
SPECIFIC GRAVITY UA: 1.01
URINE REFLEX TO CULTURE: NORMAL
URINE TYPE: NORMAL
UROBILINOGEN, URINE: 0.2 E.U./DL

## 2018-08-15 PROCEDURE — 93971 EXTREMITY STUDY: CPT

## 2018-08-15 PROCEDURE — 94729 DIFFUSING CAPACITY: CPT

## 2018-08-15 PROCEDURE — 99239 HOSP IP/OBS DSCHRG MGMT >30: CPT | Performed by: INTERNAL MEDICINE

## 2018-08-15 PROCEDURE — 80048 BASIC METABOLIC PNL TOTAL CA: CPT

## 2018-08-15 PROCEDURE — 94726 PLETHYSMOGRAPHY LUNG VOLUMES: CPT

## 2018-08-15 PROCEDURE — 94760 N-INVAS EAR/PLS OXIMETRY 1: CPT

## 2018-08-15 PROCEDURE — 93880 EXTRACRANIAL BILAT STUDY: CPT

## 2018-08-15 PROCEDURE — 36415 COLL VENOUS BLD VENIPUNCTURE: CPT

## 2018-08-15 PROCEDURE — 6370000000 HC RX 637 (ALT 250 FOR IP): Performed by: NURSE PRACTITIONER

## 2018-08-15 PROCEDURE — 81003 URINALYSIS AUTO W/O SCOPE: CPT

## 2018-08-15 PROCEDURE — 94060 EVALUATION OF WHEEZING: CPT

## 2018-08-15 PROCEDURE — 94664 DEMO&/EVAL PT USE INHALER: CPT

## 2018-08-15 PROCEDURE — 6370000000 HC RX 637 (ALT 250 FOR IP): Performed by: INTERNAL MEDICINE

## 2018-08-15 RX ORDER — ALBUTEROL SULFATE 90 UG/1
4 AEROSOL, METERED RESPIRATORY (INHALATION) ONCE
Status: COMPLETED | OUTPATIENT
Start: 2018-08-15 | End: 2018-08-15

## 2018-08-15 RX ORDER — CHOLECALCIFEROL (VITAMIN D3) 10 MCG
1 TABLET ORAL 2 TIMES DAILY
Status: DISCONTINUED | OUTPATIENT
Start: 2018-08-15 | End: 2018-08-15 | Stop reason: HOSPADM

## 2018-08-15 RX ORDER — CHLORHEXIDINE GLUCONATE 4 G/100ML
SOLUTION TOPICAL 2 TIMES DAILY
Status: DISCONTINUED | OUTPATIENT
Start: 2018-08-19 | End: 2018-08-15 | Stop reason: HOSPADM

## 2018-08-15 RX ORDER — CHLORHEXIDINE GLUCONATE 0.12 MG/ML
15 RINSE ORAL 2 TIMES DAILY
Status: DISCONTINUED | OUTPATIENT
Start: 2018-08-19 | End: 2018-08-15 | Stop reason: HOSPADM

## 2018-08-15 RX ADMIN — INSULIN LISPRO 2 UNITS: 100 INJECTION, SOLUTION INTRAVENOUS; SUBCUTANEOUS at 10:06

## 2018-08-15 RX ADMIN — OMEGA-3-ACID ETHYL ESTERS 1 CAPSULE: 1 CAPSULE, LIQUID FILLED ORAL at 09:46

## 2018-08-15 RX ADMIN — AMLODIPINE BESYLATE 10 MG: 5 TABLET ORAL at 09:47

## 2018-08-15 RX ADMIN — NEPHROCAP 1 MG: 1 CAP ORAL at 11:09

## 2018-08-15 RX ADMIN — Medication 4 PUFF: at 11:56

## 2018-08-15 RX ADMIN — FOLIC ACID 1 MG: 1 TABLET ORAL at 09:48

## 2018-08-15 RX ADMIN — RANOLAZINE 1000 MG: 500 TABLET, FILM COATED, EXTENDED RELEASE ORAL at 09:48

## 2018-08-15 RX ADMIN — FAMOTIDINE 20 MG: 20 TABLET ORAL at 09:47

## 2018-08-15 RX ADMIN — INSULIN LISPRO 2 UNITS: 100 INJECTION, SOLUTION INTRAVENOUS; SUBCUTANEOUS at 14:08

## 2018-08-15 RX ADMIN — LAMOTRIGINE 25 MG: 25 TABLET ORAL at 10:02

## 2018-08-15 RX ADMIN — LINAGLIPTIN 5 MG: 5 TABLET, FILM COATED ORAL at 09:46

## 2018-08-15 RX ADMIN — ASPIRIN 81 MG: 81 TABLET ORAL at 09:46

## 2018-08-15 RX ADMIN — ISOSORBIDE MONONITRATE 60 MG: 60 TABLET, EXTENDED RELEASE ORAL at 09:47

## 2018-08-15 RX ADMIN — METOPROLOL SUCCINATE 25 MG: 25 TABLET, EXTENDED RELEASE ORAL at 09:48

## 2018-08-15 RX ADMIN — LISINOPRIL 10 MG: 10 TABLET ORAL at 09:48

## 2018-08-15 NOTE — PROGRESS NOTES
8/15/18 @ 16:29 spoke directly with Dr. Kevon Alicea ([id number]) to advise patient going home today and hoped that he could see patient before leaving. Dr. Kevon Alicea will see patient in about an hour for Anes. Consult.  Richa Katz

## 2018-08-15 NOTE — PROGRESS NOTES
Nutrition Assessment    Type and Reason for Visit: Initial, Positive Nutrition Screen    Nutrition Recommendations:   · Continue current diet  · Start Ensure BID  · Monitor and encourage PO intake  · Monitor nutrition adequacy, weights, pertinent labs, BMs and clinical progress      Malnutrition Assessment:  · Malnutrition Status: Meets the criteria for moderate malnutrition  · Context: Acute illness or injury  · Findings of the 6 clinical characteristics of malnutrition (Minimum of 2 out of 6 clinical characteristics is required to make the diagnosis of moderate or severe Protein Calorie Malnutrition based on AND/ASPEN Guidelines):  1. Energy Intake-Less than or equal to 75%, greater than or equal to 1 month    2. Weight Loss-7.5% loss or greater (9.5%),  (2 months)  3. Fat Loss-No significant subcutaneous fat loss,    4. Muscle Loss-Moderate muscle mass loss, Temples (temporalis muscle), Interosseous  5. Fluid Accumulation-Unable to assess,    6.  Strength-Not measured    Nutrition Diagnosis:   · Problem: Inadequate oral intake  · Etiology: related to Insufficient energy/nutrient consumption     Signs and symptoms:  as evidenced by Diet history of poor intake, Nausea, Vomiting, Weight loss    Nutrition Assessment:  · Subjective Assessment: Positive screen for weight loss and poor po intake. Patient is a 53 y.o. male admitted for chronic total occlusion of native coronary artery. PMHx of CAD, HTN, T2DM, HLD, Hashimoto's Thyroiditis, bipolar disorder and VOLODYMYR. Patient s/p cardiac cath 8/14/18, unsuccessful revascularization of , referred for surgical intervention. Possible CABG on Monday, 8/ 20/18. CBW is 199 lb, weight from encounter in June 2018 was 200 lb. Patient seen in room this date, family at bedside. P reports nausea, vomiting and weight loss on and off since March 2018. Pt endorses 20 lb weight loss x 2 months d/t decreased appetite.  Currently, appetite improving and pt with 3 meal intakes of % since admission. Pt drinks Ensure at home, will order. Encouraged meals as able to promote adequate intake and preserve LBM. Pt and family with no nutrition questions at this time. · Nutrition-Focused Physical Findings: see malnutrition assessment  · Wound Type: None  · Current Nutrition Therapies:  · Oral Diet Orders: Cardiac   · Oral Diet intake: % (per patient)  · Oral Nutrition Supplement (ONS) Orders: None  · Anthropometric Measures:  · Ht: 6' (182.9 cm)   · Current Body Wt: 199 lb (90.3 kg)  · Usual Body Wt: 220 lb (99.8 kg) (June 2018)  · % Weight Change: 9.5% x 2 months,     · Ideal Body Wt: 178 lb (80.7 kg)   · BMI Classification: BMI 25.0 - 29.9 Overweight  · Comparative Standards (Estimated Nutrition Needs):  · Estimated Daily Total Kcal: 6663-3051  · Estimated Daily Protein (g): 81-97    Estimated Intake vs Estimated Needs: Intake Less Than Needs    Nutrition Risk Level: Moderate    Nutrition Interventions:   Continue current diet, Start ONS  Continued Inpatient Monitoring    Nutrition Evaluation:   · Evaluation: Goals set   · Goals: PO intakes of 50% or more this admission    · Monitoring: Meal Intake, Diet Tolerance, Pertinent Labs, Weight, Supplement Intake    See Adult Nutrition Doc Flowsheet for more detail.      Electronically signed by Syd Blackwood RD, DYLON on 8/15/18 at 10:10 AM    Contact Number: [phone]

## 2018-08-15 NOTE — DISCHARGE SUMMARY
Patient ID:  Letha Paniagua  [id number]  15 y.o.  1970    Admit date: 8/14/2018    Discharge date:  8/15/2018    Admitting Physician:  GAIL Glover MD, [facility]    Discharge Physician: Jj Proctor. Irma Glover MD, [facility]    Admission Diagnoses:   mid LAD    Discharge Diagnoses: SAME    Admission Condition: fair    Discharged Condition: good    Hospital Course:  Patient underwent cardiac cath yesterday for disabling angina. He has known chronic total occlusion () of mid LAD. We were able to cross the cap. However, we remained subintimal.  Antegrade dissection reentry and parallel-wiring technique was unsuccessful to gain access to true lumen. Distal LAD was not seen with both antegrade and retrograde injections suggestive of another  of distal LAD. Considering his disabling  angina, he was referred for single-vessel LIMA to LAD bypass graft. He did well overnight and had no complaints.     Discharge Exam:  /67   Pulse 55   Temp 97.8 °F (36.6 °C) (Oral)   Resp 16   Ht 6' (1.829 m)   Wt 199 lb 3.2 oz (90.4 kg)   SpO2 97%   BMI 27.02 kg/m²     General Appearance:    Alert, cooperative, no distress, appears stated age   Head:    Normocephalic, without obvious abnormality, atraumatic   Eyes:    PERRL, conjunctiva/corneas clear, EOM's intact, fundi     benign, both eyes        Ears:    Normal TM's and external ear canals, both ears   Nose:   Nares normal, septum midline, mucosa normal, no drainage    or sinus tenderness   Throat:   Lips, mucosa, and tongue normal; teeth and gums normal   Neck:   Supple, symmetrical, trachea midline, no adenopathy;        thyroid:  No enlargement/tenderness/nodules; no carotid    bruit or JVD   Back:     Symmetric, no curvature, ROM normal, no CVA tenderness   Lungs:     Clear to auscultation bilaterally, respirations unlabored   Chest wall:    No tenderness or deformity   Heart:    Regular rate and rhythm, S1 and S2 normal, no murmur, rub   or gallop Abdomen:     Soft, non-tender, bowel sounds active all four quadrants,     no masses, no organomegaly   Genitalia:    deferred   Rectal:    deferred    Extremities:   Extremities normal, atraumatic, no cyanosis or edema   Pulses:   2+ and symmetric all extremities   Skin:   Skin color, texture, turgor normal, no rashes or lesions   Lymph nodes:   Cervical, supraclavicular, and axillary nodes normal   Neurologic:   CNII-XII intact. Normal strength, sensation and reflexes       throughout      Cath site:   No pain or discomfort on palpation. Pulse is normal.             There is no evidence for a hematoma or vascular              injury. Disposition: home    Patient Instructions:   Current Discharge Medication List      CONTINUE these medications which have NOT CHANGED    Details   lisinopril (PRINIVIL;ZESTRIL) 10 MG tablet Take 10 mg by mouth daily      Chromium 200 MCG CAPS Take 1 mg by mouth      lamoTRIgine (LAMICTAL) 25 MG tablet Take 25 mgs PO Qdaily for 10 days, then take 50mgs PO Qdaily for next 14 days, then take 75 mgs PO Qdaily for next 14 days, then take 100mgs PO Qdaily for next 14 days, then take 125mgs PO Qdaily for next 14 days, then take 150mgs PO Qdaily.   Qty: 60 tablet, Refills: 3      alogliptin-metformin (KAZANO) 12.5-1000 MG TABS Take 1 capsule by mouth 2 times daily      insulin aspart (NOVOLOG) 100 UNIT/ML injection vial Inject 8 Units into the skin as needed for High Blood Sugar      levothyroxine (SYNTHROID) 150 MCG tablet Take 150 mcg by mouth nightly      b complex vitamins capsule Take 1 capsule by mouth 2 times daily      atorvastatin (LIPITOR) 80 MG tablet Take 1 tablet by mouth nightly  Qty: 90 tablet, Refills: 3      ranolazine (RANEXA) 1000 MG extended release tablet Take 1 tablet by mouth 2 times daily  Qty: 60 tablet, Refills: 6      famotidine (PEPCID) 20 MG tablet Take 1 tablet by mouth 2 times daily  Qty: 60 tablet, Refills: 0      Cinnamon 500 MG CAPS Take 1 capsule by mouth daily      Omega-3 Fatty Acids (FISH OIL) 1360 MG CAPS Take 1 capsule by mouth daily      insulin glargine (BASAGLAR KWIKPEN) 100 UNIT/ML injection pen Inject 10 Units into the skin every morning       metoprolol succinate (TOPROL XL) 25 MG extended release tablet Take 1 tablet by mouth daily  Qty: 30 tablet, Refills: 3      isosorbide mononitrate (IMDUR) 60 MG extended release tablet Take 1 tablet by mouth daily  Qty: 30 tablet, Refills: 3      aspirin 81 MG EC tablet Take 1 tablet by mouth daily  Qty: 30 tablet, Refills: 3      nitroGLYCERIN (NITROSTAT) 0.4 MG SL tablet up to max of 3 total doses. If no relief after 1 dose, call 911. Qty: 25 tablet, Refills: 3      folic acid (FOLVITE) 1 MG tablet Take 1 mg by mouth daily      amLODIPine (NORVASC) 10 MG tablet Take 1 tablet by mouth daily  Qty: 30 tablet, Refills: 3         STOP taking these medications       clopidogrel (PLAVIX) 75 MG tablet Comments:   Reason for Stopping:               Activity: no lifting, Driving, or Strenuous exercise for 2 weeks  Diet: cardiac diet  Wound Care: keep wound clean and dry. Please call the office of physician on call if problems with access site. Follow-up with 2 weeks with [facility]. Discharge time spent: > 30 min      M.  Maeve Jorgensen MD, [facility],  [facility]  [phone] [facility]  [phone] Main Office

## 2018-08-16 ENCOUNTER — ANESTHESIA EVENT (OUTPATIENT)
Dept: OPERATING ROOM | Age: 48
DRG: 236 | End: 2018-08-16
Payer: MEDICARE

## 2018-08-16 LAB
EKG ATRIAL RATE: 54 BPM
EKG DIAGNOSIS: NORMAL
EKG P AXIS: 49 DEGREES
EKG P-R INTERVAL: 148 MS
EKG Q-T INTERVAL: 476 MS
EKG QRS DURATION: 96 MS
EKG QTC CALCULATION (BAZETT): 451 MS
EKG R AXIS: 27 DEGREES
EKG T AXIS: 33 DEGREES
EKG VENTRICULAR RATE: 54 BPM

## 2018-08-17 ENCOUNTER — TELEPHONE (OUTPATIENT)
Dept: CARDIOTHORACIC SURGERY | Age: 48
End: 2018-08-17

## 2018-08-17 RX ORDER — MIDAZOLAM HYDROCHLORIDE 1 MG/ML
2 INJECTION INTRAMUSCULAR; INTRAVENOUS ONCE
Status: CANCELLED | OUTPATIENT
Start: 2018-08-20

## 2018-08-17 ASSESSMENT — ENCOUNTER SYMPTOMS: SHORTNESS OF BREATH: 1

## 2018-08-17 NOTE — ANESTHESIA PRE PROCEDURE
Historical Provider, MD   Omega-3 Fatty Acids (FISH OIL) 1360 MG CAPS Take 1 capsule by mouth daily   Yes Historical Provider, MD   insulin glargine (BASAGLAR KWIKPEN) 100 UNIT/ML injection pen Inject 10 Units into the skin every morning    Yes Historical Provider, MD   metoprolol succinate (TOPROL XL) 25 MG extended release tablet Take 1 tablet by mouth daily 6/18/18  Yes Donna Cyr MD   isosorbide mononitrate (IMDUR) 60 MG extended release tablet Take 1 tablet by mouth daily 6/12/18  Yes Ivory Ireland MD   aspirin 81 MG EC tablet Take 1 tablet by mouth daily 3/23/18  Yes Gail Oreilly MD   folic acid (FOLVITE) 1 MG tablet Take 1 mg by mouth daily   Yes Historical Provider, MD   nitroGLYCERIN (NITROSTAT) 0.4 MG SL tablet up to max of 3 total doses. If no relief after 1 dose, call 911. 3/23/18   Gail Oreilly MD       Current medications:    No current facility-administered medications for this encounter. Current Outpatient Prescriptions   Medication Sig Dispense Refill    lisinopril (PRINIVIL;ZESTRIL) 10 MG tablet Take 10 mg by mouth daily      Chromium 200 MCG CAPS Take 1 mg by mouth      lamoTRIgine (LAMICTAL) 25 MG tablet Take 25 mgs PO Qdaily for 10 days, then take 50mgs PO Qdaily for next 14 days, then take 75 mgs PO Qdaily for next 14 days, then take 100mgs PO Qdaily for next 14 days, then take 125mgs PO Qdaily for next 14 days, then take 150mgs PO Qdaily.  60 tablet 3    amLODIPine (NORVASC) 10 MG tablet Take 1 tablet by mouth daily 30 tablet 3    alogliptin-metformin (KAZANO) 12.5-1000 MG TABS Take 1 capsule by mouth 2 times daily      insulin aspart (NOVOLOG) 100 UNIT/ML injection vial Inject 8 Units into the skin as needed for High Blood Sugar      levothyroxine (SYNTHROID) 150 MCG tablet Take 150 mcg by mouth nightly      b complex vitamins capsule Take 1 capsule by mouth 2 times daily      atorvastatin (LIPITOR) 80 MG tablet Take 1 tablet by mouth nightly 90 tablet 3 PROTIME 12.0 08/14/2018    INR 1.05 08/14/2018    APTT 31.1 07/24/2018       HCG (If Applicable): No results found for: PREGTESTUR, PREGSERUM, HCG, HCGQUANT     ABGs:   Lab Results   Component Value Date    PHART 7.399 01/13/2012    PO2ART 78.7 01/13/2012    SCA0DFH 33.3 01/13/2012    IZK9DYG 20.1 01/13/2012    BEART -3.7 01/13/2012    L0HWSETE 95.8 01/13/2012        Type & Screen (If Applicable):  No results found for: LABABO, LABRH    Anesthesia Evaluation   history of anesthetic complications:   Airway: Mallampati: III  TM distance: <3 FB   Neck ROM: full  Mouth opening: > = 3 FB Dental:          Pulmonary:   (+) shortness of breath:  sleep apnea:                             Cardiovascular:    (+) hypertension:, angina:, CAD:, ROGERS:, hyperlipidemia                  Neuro/Psych:   (+) psychiatric history:            GI/Hepatic/Renal:             Endo/Other:    (+) DiabetesType II DM, using insulin, hypothyroidism::., .                 Abdominal:           Vascular:                                      Anesthesia Plan      general     ASA 4     (  [address] [place] [address] Close EVALUATION FORM       Name:  Pia Thomason                                         Age:  53 y.o. MRN:  [id number]           I discussed intravenous with inhalational endotracheal anesthesia with pulmonary artery catheter, radial ( or other artery if required) catheter, possible transesophageal echocardiography and post-operative ventilation including risks and alternatives with the patient . The patient has no further questions. Dulce Voss MD  August 17, 2018  10:19 AM)  Induction: inhalational and intravenous. arterial line, BIS, central line, PA catheter and DEBORAH    Anesthetic plan and risks discussed with patient.                       Dulce Voss MD   8/17/2018

## 2018-08-17 NOTE — PROGRESS NOTES
Obstructive Sleep Apnea (LAYO) Screening     Patient:  Bess Miranda    YOB: 1970      Medical Record #:  [id number]                     Date:  8/17/2018     1. Are you a loud and/or regular snorer? [x]  Yes       [] No    2. Have you been observed to gasp or stop breathing during sleep? [x]  Yes       [] No    3. Do you feel tired or groggy upon awakening or do you awaken with a headache? [x]  Yes       [] No    4. Are you often tired or fatigued during the wake time hours? []  Yes       [x] No    5. Do you fall asleep sitting, reading, watching TV or driving? []  Yes       [x] No    6. Do you often have problems with memory or concentration? []  Yes       [x] No    **If patient's score is ? 3 they are considered high risk for LAYO. Notify the anesthesiologist of the high risk and document in focus note. Note:  If the patient's BMI is more than 35 kg m¯² , has neck circumference > 40 cm, and/or high blood pressure the risk is greater (© American Sleep Apnea Association, 2006).     Pt has sleep apnea but doesn't use a CPAP

## 2018-08-20 ENCOUNTER — APPOINTMENT (OUTPATIENT)
Dept: GENERAL RADIOLOGY | Age: 48
DRG: 236 | End: 2018-08-20
Attending: THORACIC SURGERY (CARDIOTHORACIC VASCULAR SURGERY)
Payer: MEDICARE

## 2018-08-20 ENCOUNTER — HOSPITAL ENCOUNTER (INPATIENT)
Age: 48
LOS: 4 days | Discharge: HOME | DRG: 236 | End: 2018-08-24
Attending: THORACIC SURGERY (CARDIOTHORACIC VASCULAR SURGERY) | Admitting: THORACIC SURGERY (CARDIOTHORACIC VASCULAR SURGERY)
Payer: MEDICARE

## 2018-08-20 ENCOUNTER — ANESTHESIA (OUTPATIENT)
Dept: OPERATING ROOM | Age: 48
DRG: 236 | End: 2018-08-20
Payer: MEDICARE

## 2018-08-20 VITALS
OXYGEN SATURATION: 100 % | TEMPERATURE: 96.4 F | RESPIRATION RATE: 12 BRPM | DIASTOLIC BLOOD PRESSURE: 80 MMHG | SYSTOLIC BLOOD PRESSURE: 124 MMHG

## 2018-08-20 DIAGNOSIS — G89.18 ACUTE POSTOPERATIVE PAIN: ICD-10-CM

## 2018-08-20 DIAGNOSIS — I25.118 CORONARY ARTERY DISEASE OF NATIVE ARTERY OF NATIVE HEART WITH STABLE ANGINA PECTORIS (HCC): ICD-10-CM

## 2018-08-20 DIAGNOSIS — Z01.818 PREOPERATIVE TESTING: Primary | ICD-10-CM

## 2018-08-20 PROBLEM — I25.10 CAD IN NATIVE ARTERY: Status: ACTIVE | Noted: 2018-08-20

## 2018-08-20 LAB
ABO/RH: NORMAL
ANION GAP SERPL CALCULATED.3IONS-SCNC: 10 MMOL/L (ref 3–16)
ANION GAP SERPL CALCULATED.3IONS-SCNC: 8 MMOL/L (ref 3–16)
ANTIBODY SCREEN: NORMAL
APTT: 32.2 SEC (ref 26–36)
BASE EXCESS ARTERIAL: -2 (ref -3–3)
BASE EXCESS ARTERIAL: 1 (ref -3–3)
BLOOD BANK DISPENSE STATUS: NORMAL
BLOOD BANK DISPENSE STATUS: NORMAL
BLOOD BANK PRODUCT CODE: NORMAL
BLOOD BANK PRODUCT CODE: NORMAL
BPU ID: NORMAL
BPU ID: NORMAL
BUN BLDV-MCNC: 14 MG/DL (ref 7–20)
BUN BLDV-MCNC: 9 MG/DL (ref 7–20)
CALCIUM IONIZED: 1.2 MMOL/L (ref 1.12–1.32)
CALCIUM IONIZED: 1.23 MMOL/L (ref 1.12–1.32)
CALCIUM IONIZED: 1.24 MMOL/L (ref 1.12–1.32)
CALCIUM IONIZED: 1.28 MMOL/L (ref 1.12–1.32)
CALCIUM SERPL-MCNC: 7.4 MG/DL (ref 8.3–10.6)
CALCIUM SERPL-MCNC: 8.1 MG/DL (ref 8.3–10.6)
CHLORIDE BLD-SCNC: 105 MMOL/L (ref 99–110)
CHLORIDE BLD-SCNC: 110 MMOL/L (ref 99–110)
CO2: 21 MMOL/L (ref 21–32)
CO2: 23 MMOL/L (ref 21–32)
CREAT SERPL-MCNC: 0.7 MG/DL (ref 0.9–1.3)
CREAT SERPL-MCNC: 0.9 MG/DL (ref 0.9–1.3)
DESCRIPTION BLOOD BANK: NORMAL
DESCRIPTION BLOOD BANK: NORMAL
FIBRINOGEN: 231 MG/DL (ref 200–397)
GFR AFRICAN AMERICAN: >60
GFR AFRICAN AMERICAN: >60
GFR NON-AFRICAN AMERICAN: >60
GFR NON-AFRICAN AMERICAN: >60
GLUCOSE BLD-MCNC: 105 MG/DL (ref 70–99)
GLUCOSE BLD-MCNC: 108 MG/DL (ref 70–99)
GLUCOSE BLD-MCNC: 109 MG/DL (ref 70–99)
GLUCOSE BLD-MCNC: 112 MG/DL (ref 70–99)
GLUCOSE BLD-MCNC: 118 MG/DL (ref 70–99)
GLUCOSE BLD-MCNC: 122 MG/DL (ref 70–99)
GLUCOSE BLD-MCNC: 126 MG/DL (ref 70–99)
GLUCOSE BLD-MCNC: 130 MG/DL (ref 70–99)
GLUCOSE BLD-MCNC: 132 MG/DL (ref 70–99)
GLUCOSE BLD-MCNC: 138 MG/DL (ref 70–99)
GLUCOSE BLD-MCNC: 138 MG/DL (ref 70–99)
GLUCOSE BLD-MCNC: 141 MG/DL (ref 70–99)
GLUCOSE BLD-MCNC: 156 MG/DL (ref 70–99)
GLUCOSE BLD-MCNC: 163 MG/DL (ref 70–99)
GLUCOSE BLD-MCNC: 167 MG/DL (ref 70–99)
GLUCOSE BLD-MCNC: 175 MG/DL (ref 70–99)
GLUCOSE BLD-MCNC: 178 MG/DL (ref 70–99)
GLUCOSE BLD-MCNC: 210 MG/DL (ref 70–99)
HCO3 ARTERIAL: 23.7 MMOL/L (ref 21–29)
HCO3 ARTERIAL: 24.5 MMOL/L (ref 21–29)
HCO3 ARTERIAL: 24.9 MMOL/L (ref 21–29)
HCO3 ARTERIAL: 26.5 MMOL/L (ref 21–29)
HCT VFR BLD CALC: 25.3 % (ref 40.5–52.5)
HCT VFR BLD CALC: 26.2 % (ref 40.5–52.5)
HCT VFR BLD CALC: 29.7 % (ref 40.5–52.5)
HEMOGLOBIN: 10 G/DL (ref 13.5–17.5)
HEMOGLOBIN: 11 GM/DL (ref 13.5–17.5)
HEMOGLOBIN: 8.1 GM/DL (ref 13.5–17.5)
HEMOGLOBIN: 8.4 GM/DL (ref 13.5–17.5)
HEMOGLOBIN: 8.6 G/DL (ref 13.5–17.5)
HEMOGLOBIN: 8.9 G/DL (ref 13.5–17.5)
HEMOGLOBIN: 9.9 GM/DL (ref 13.5–17.5)
INR BLD: 1.17 (ref 0.86–1.14)
LACTATE: 2 MMOL/L (ref 0.4–2)
LACTATE: 2.15 MMOL/L (ref 0.4–2)
MAGNESIUM: 2.5 MG/DL (ref 1.8–2.4)
MCH RBC QN AUTO: 28.9 PG (ref 26–34)
MCH RBC QN AUTO: 29.1 PG (ref 26–34)
MCHC RBC AUTO-ENTMCNC: 33.5 G/DL (ref 31–36)
MCHC RBC AUTO-ENTMCNC: 33.9 G/DL (ref 31–36)
MCV RBC AUTO: 85.9 FL (ref 80–100)
MCV RBC AUTO: 86.3 FL (ref 80–100)
O2 SAT, ARTERIAL: 100 % (ref 93–100)
PCO2 ARTERIAL: 34.6 MM HG (ref 35–45)
PCO2 ARTERIAL: 37.9 MM HG (ref 35–45)
PCO2 ARTERIAL: 41.7 MM HG (ref 35–45)
PCO2 ARTERIAL: 45.5 MM HG (ref 35–45)
PDW BLD-RTO: 14.2 % (ref 12.4–15.4)
PDW BLD-RTO: 14.5 % (ref 12.4–15.4)
PERFORMED ON: ABNORMAL
PH ARTERIAL: 7.36 (ref 7.35–7.45)
PH ARTERIAL: 7.37 (ref 7.35–7.45)
PH ARTERIAL: 7.43 (ref 7.35–7.45)
PH ARTERIAL: 7.46 (ref 7.35–7.45)
PLATELET # BLD: 157 K/UL (ref 135–450)
PLATELET # BLD: 162 K/UL (ref 135–450)
PMV BLD AUTO: 8 FL (ref 5–10.5)
PMV BLD AUTO: 8.1 FL (ref 5–10.5)
PO2 ARTERIAL: 233.7 MM HG (ref 75–108)
PO2 ARTERIAL: 360.4 MM HG (ref 75–108)
PO2 ARTERIAL: 440.7 MM HG (ref 75–108)
PO2 ARTERIAL: 509.6 MM HG (ref 75–108)
POC HEMATOCRIT: 24 % (ref 41–53)
POC HEMATOCRIT: 25 % (ref 41–53)
POC HEMATOCRIT: 29 % (ref 41–53)
POC HEMATOCRIT: 32 % (ref 41–53)
POC POTASSIUM: 4.2 MMOL/L (ref 3.5–5.1)
POC POTASSIUM: 4.4 MMOL/L (ref 3.5–5.1)
POC POTASSIUM: 5 MMOL/L (ref 3.5–5.1)
POC POTASSIUM: 5.1 MMOL/L (ref 3.5–5.1)
POC SAMPLE TYPE: ABNORMAL
POC SODIUM: 141 MMOL/L (ref 136–145)
POC SODIUM: 141 MMOL/L (ref 136–145)
POC SODIUM: 143 MMOL/L (ref 136–145)
POC SODIUM: 145 MMOL/L (ref 136–145)
POTASSIUM SERPL-SCNC: 4.3 MMOL/L (ref 3.5–5.1)
POTASSIUM SERPL-SCNC: 4.3 MMOL/L (ref 3.5–5.1)
POTASSIUM SERPL-SCNC: 4.4 MMOL/L (ref 3.5–5.1)
PROTHROMBIN TIME: 13.3 SEC (ref 9.8–13)
RBC # BLD: 2.96 M/UL (ref 4.2–5.9)
RBC # BLD: 3.45 M/UL (ref 4.2–5.9)
SODIUM BLD-SCNC: 138 MMOL/L (ref 136–145)
SODIUM BLD-SCNC: 139 MMOL/L (ref 136–145)
TCO2 ARTERIAL: 25 MMOL/L
TCO2 ARTERIAL: 26 MMOL/L
TCO2 ARTERIAL: 26 MMOL/L
TCO2 ARTERIAL: 28 MMOL/L
WBC # BLD: 13.5 K/UL (ref 4–11)
WBC # BLD: 9.6 K/UL (ref 4–11)

## 2018-08-20 PROCEDURE — 7100000001 HC PACU RECOVERY - ADDTL 15 MIN

## 2018-08-20 PROCEDURE — 2100000000 HC CCU R&B

## 2018-08-20 PROCEDURE — 3700000000 HC ANESTHESIA ATTENDED CARE: Performed by: THORACIC SURGERY (CARDIOTHORACIC VASCULAR SURGERY)

## 2018-08-20 PROCEDURE — 2500000003 HC RX 250 WO HCPCS: Performed by: ANESTHESIOLOGY

## 2018-08-20 PROCEDURE — 84295 ASSAY OF SERUM SODIUM: CPT

## 2018-08-20 PROCEDURE — 3600000018 HC SURGERY OHS ADDTL 15MIN: Performed by: THORACIC SURGERY (CARDIOTHORACIC VASCULAR SURGERY)

## 2018-08-20 PROCEDURE — 06BP4ZZ EXCISION OF RIGHT SAPHENOUS VEIN, PERCUTANEOUS ENDOSCOPIC APPROACH: ICD-10-PCS | Performed by: THORACIC SURGERY (CARDIOTHORACIC VASCULAR SURGERY)

## 2018-08-20 PROCEDURE — 71045 X-RAY EXAM CHEST 1 VIEW: CPT

## 2018-08-20 PROCEDURE — C1729 CATH, DRAINAGE: HCPCS | Performed by: THORACIC SURGERY (CARDIOTHORACIC VASCULAR SURGERY)

## 2018-08-20 PROCEDURE — 6360000002 HC RX W HCPCS: Performed by: THORACIC SURGERY (CARDIOTHORACIC VASCULAR SURGERY)

## 2018-08-20 PROCEDURE — 80048 BASIC METABOLIC PNL TOTAL CA: CPT

## 2018-08-20 PROCEDURE — 2780000010 HC IMPLANT OTHER: Performed by: THORACIC SURGERY (CARDIOTHORACIC VASCULAR SURGERY)

## 2018-08-20 PROCEDURE — S0028 INJECTION, FAMOTIDINE, 20 MG: HCPCS | Performed by: THORACIC SURGERY (CARDIOTHORACIC VASCULAR SURGERY)

## 2018-08-20 PROCEDURE — 2700000000 HC OXYGEN THERAPY PER DAY

## 2018-08-20 PROCEDURE — P9045 ALBUMIN (HUMAN), 5%, 250 ML: HCPCS | Performed by: THORACIC SURGERY (CARDIOTHORACIC VASCULAR SURGERY)

## 2018-08-20 PROCEDURE — 85018 HEMOGLOBIN: CPT

## 2018-08-20 PROCEDURE — 6360000002 HC RX W HCPCS: Performed by: ANESTHESIOLOGY

## 2018-08-20 PROCEDURE — 2709999900 HC NON-CHARGEABLE SUPPLY: Performed by: THORACIC SURGERY (CARDIOTHORACIC VASCULAR SURGERY)

## 2018-08-20 PROCEDURE — 86850 RBC ANTIBODY SCREEN: CPT

## 2018-08-20 PROCEDURE — 83735 ASSAY OF MAGNESIUM: CPT

## 2018-08-20 PROCEDURE — 85014 HEMATOCRIT: CPT

## 2018-08-20 PROCEDURE — 36415 COLL VENOUS BLD VENIPUNCTURE: CPT

## 2018-08-20 PROCEDURE — 2720000000 HC MISC SURG SUPPLY STERILE $0-50: Performed by: THORACIC SURGERY (CARDIOTHORACIC VASCULAR SURGERY)

## 2018-08-20 PROCEDURE — 86923 COMPATIBILITY TEST ELECTRIC: CPT

## 2018-08-20 PROCEDURE — B246ZZ4 ULTRASONOGRAPHY OF RIGHT AND LEFT HEART, TRANSESOPHAGEAL: ICD-10-PCS | Performed by: THORACIC SURGERY (CARDIOTHORACIC VASCULAR SURGERY)

## 2018-08-20 PROCEDURE — 6360000002 HC RX W HCPCS

## 2018-08-20 PROCEDURE — 84132 ASSAY OF SERUM POTASSIUM: CPT

## 2018-08-20 PROCEDURE — P9047 ALBUMIN (HUMAN), 25%, 50ML: HCPCS

## 2018-08-20 PROCEDURE — 82803 BLOOD GASES ANY COMBINATION: CPT

## 2018-08-20 PROCEDURE — 94761 N-INVAS EAR/PLS OXIMETRY MLT: CPT

## 2018-08-20 PROCEDURE — 82330 ASSAY OF CALCIUM: CPT

## 2018-08-20 PROCEDURE — 85730 THROMBOPLASTIN TIME PARTIAL: CPT

## 2018-08-20 PROCEDURE — 83605 ASSAY OF LACTIC ACID: CPT

## 2018-08-20 PROCEDURE — 94664 DEMO&/EVAL PT USE INHALER: CPT

## 2018-08-20 PROCEDURE — 82947 ASSAY GLUCOSE BLOOD QUANT: CPT

## 2018-08-20 PROCEDURE — 85384 FIBRINOGEN ACTIVITY: CPT

## 2018-08-20 PROCEDURE — 85027 COMPLETE CBC AUTOMATED: CPT

## 2018-08-20 PROCEDURE — 6370000000 HC RX 637 (ALT 250 FOR IP): Performed by: THORACIC SURGERY (CARDIOTHORACIC VASCULAR SURGERY)

## 2018-08-20 PROCEDURE — 86900 BLOOD TYPING SEROLOGIC ABO: CPT

## 2018-08-20 PROCEDURE — 3600000008 HC SURGERY OHS BASE: Performed by: THORACIC SURGERY (CARDIOTHORACIC VASCULAR SURGERY)

## 2018-08-20 PROCEDURE — 3700000001 HC ADD 15 MINUTES (ANESTHESIA): Performed by: THORACIC SURGERY (CARDIOTHORACIC VASCULAR SURGERY)

## 2018-08-20 PROCEDURE — 2500000003 HC RX 250 WO HCPCS: Performed by: THORACIC SURGERY (CARDIOTHORACIC VASCULAR SURGERY)

## 2018-08-20 PROCEDURE — 7100000000 HC PACU RECOVERY - FIRST 15 MIN

## 2018-08-20 PROCEDURE — 2580000003 HC RX 258: Performed by: THORACIC SURGERY (CARDIOTHORACIC VASCULAR SURGERY)

## 2018-08-20 PROCEDURE — 5A1221Z PERFORMANCE OF CARDIAC OUTPUT, CONTINUOUS: ICD-10-PCS | Performed by: THORACIC SURGERY (CARDIOTHORACIC VASCULAR SURGERY)

## 2018-08-20 PROCEDURE — 94640 AIRWAY INHALATION TREATMENT: CPT

## 2018-08-20 PROCEDURE — 2500000003 HC RX 250 WO HCPCS

## 2018-08-20 PROCEDURE — 02100Z9 BYPASS CORONARY ARTERY, ONE ARTERY FROM LEFT INTERNAL MAMMARY, OPEN APPROACH: ICD-10-PCS | Performed by: THORACIC SURGERY (CARDIOTHORACIC VASCULAR SURGERY)

## 2018-08-20 PROCEDURE — 2720000010 HC SURG SUPPLY STERILE: Performed by: THORACIC SURGERY (CARDIOTHORACIC VASCULAR SURGERY)

## 2018-08-20 PROCEDURE — 2580000003 HC RX 258: Performed by: ANESTHESIOLOGY

## 2018-08-20 PROCEDURE — 021109W BYPASS CORONARY ARTERY, TWO ARTERIES FROM AORTA WITH AUTOLOGOUS VENOUS TISSUE, OPEN APPROACH: ICD-10-PCS | Performed by: THORACIC SURGERY (CARDIOTHORACIC VASCULAR SURGERY)

## 2018-08-20 PROCEDURE — 85610 PROTHROMBIN TIME: CPT

## 2018-08-20 PROCEDURE — 86901 BLOOD TYPING SEROLOGIC RH(D): CPT

## 2018-08-20 PROCEDURE — 85347 COAGULATION TIME ACTIVATED: CPT

## 2018-08-20 PROCEDURE — 2580000003 HC RX 258

## 2018-08-20 PROCEDURE — C9290 INJ, BUPIVACAINE LIPOSOME: HCPCS | Performed by: THORACIC SURGERY (CARDIOTHORACIC VASCULAR SURGERY)

## 2018-08-20 RX ORDER — MIDAZOLAM HYDROCHLORIDE 1 MG/ML
1 INJECTION INTRAMUSCULAR; INTRAVENOUS
Status: DISCONTINUED | OUTPATIENT
Start: 2018-08-20 | End: 2018-08-24 | Stop reason: HOSPADM

## 2018-08-20 RX ORDER — ALBUMIN, HUMAN INJ 5% 5 %
25 SOLUTION INTRAVENOUS PRN
Status: DISCONTINUED | OUTPATIENT
Start: 2018-08-20 | End: 2018-08-24 | Stop reason: HOSPADM

## 2018-08-20 RX ORDER — SODIUM CHLORIDE 0.9 % (FLUSH) 0.9 %
10 SYRINGE (ML) INJECTION PRN
Status: DISCONTINUED | OUTPATIENT
Start: 2018-08-20 | End: 2018-08-20

## 2018-08-20 RX ORDER — FUROSEMIDE 10 MG/ML
40 INJECTION INTRAMUSCULAR; INTRAVENOUS 2 TIMES DAILY
Status: DISCONTINUED | OUTPATIENT
Start: 2018-08-21 | End: 2018-08-22

## 2018-08-20 RX ORDER — SODIUM CHLORIDE 0.9 % (FLUSH) 0.9 %
10 SYRINGE (ML) INJECTION EVERY 12 HOURS SCHEDULED
Status: DISCONTINUED | OUTPATIENT
Start: 2018-08-20 | End: 2018-08-24 | Stop reason: HOSPADM

## 2018-08-20 RX ORDER — CLOPIDOGREL BISULFATE 75 MG/1
75 TABLET ORAL DAILY
Status: DISCONTINUED | OUTPATIENT
Start: 2018-08-21 | End: 2018-08-24 | Stop reason: HOSPADM

## 2018-08-20 RX ORDER — TRANEXAMIC ACID 100 MG/ML
INJECTION, SOLUTION INTRAVENOUS PRN
Status: DISCONTINUED | OUTPATIENT
Start: 2018-08-20 | End: 2018-08-20 | Stop reason: SDUPTHER

## 2018-08-20 RX ORDER — POTASSIUM CHLORIDE 29.8 MG/ML
20 INJECTION INTRAVENOUS PRN
Status: DISCONTINUED | OUTPATIENT
Start: 2018-08-20 | End: 2018-08-24 | Stop reason: HOSPADM

## 2018-08-20 RX ORDER — FENTANYL CITRATE 50 UG/ML
INJECTION, SOLUTION INTRAMUSCULAR; INTRAVENOUS PRN
Status: DISCONTINUED | OUTPATIENT
Start: 2018-08-20 | End: 2018-08-20 | Stop reason: SDUPTHER

## 2018-08-20 RX ORDER — SODIUM CHLORIDE FOR INHALATION 3 %
3 VIAL, NEBULIZER (ML) INHALATION 4 TIMES DAILY PRN
Status: DISCONTINUED | OUTPATIENT
Start: 2018-08-20 | End: 2018-08-24 | Stop reason: HOSPADM

## 2018-08-20 RX ORDER — ONDANSETRON 2 MG/ML
4 INJECTION INTRAMUSCULAR; INTRAVENOUS EVERY 8 HOURS PRN
Status: DISCONTINUED | OUTPATIENT
Start: 2018-08-20 | End: 2018-08-24 | Stop reason: HOSPADM

## 2018-08-20 RX ORDER — ALBUTEROL SULFATE 2.5 MG/3ML
2.5 SOLUTION RESPIRATORY (INHALATION)
Status: DISCONTINUED | OUTPATIENT
Start: 2018-08-20 | End: 2018-08-24 | Stop reason: HOSPADM

## 2018-08-20 RX ORDER — KETAMINE HYDROCHLORIDE 100 MG/ML
INJECTION, SOLUTION INTRAMUSCULAR; INTRAVENOUS PRN
Status: DISCONTINUED | OUTPATIENT
Start: 2018-08-20 | End: 2018-08-20 | Stop reason: SDUPTHER

## 2018-08-20 RX ORDER — PAPAVERINE HYDROCHLORIDE 30 MG/ML
INJECTION INTRAMUSCULAR; INTRAVENOUS PRN
Status: DISCONTINUED | OUTPATIENT
Start: 2018-08-20 | End: 2018-08-20

## 2018-08-20 RX ORDER — LACTULOSE 10 G/15ML
10 SOLUTION ORAL 2 TIMES DAILY
Status: DISCONTINUED | OUTPATIENT
Start: 2018-08-21 | End: 2018-08-24 | Stop reason: HOSPADM

## 2018-08-20 RX ORDER — INSULIN GLARGINE 100 [IU]/ML
0.25 INJECTION, SOLUTION SUBCUTANEOUS NIGHTLY
Status: COMPLETED | OUTPATIENT
Start: 2018-08-21 | End: 2018-08-21

## 2018-08-20 RX ORDER — DEXTROSE MONOHYDRATE 25 G/50ML
12.5 INJECTION, SOLUTION INTRAVENOUS PRN
Status: DISCONTINUED | OUTPATIENT
Start: 2018-08-20 | End: 2018-08-24 | Stop reason: HOSPADM

## 2018-08-20 RX ORDER — HYDROMORPHONE HCL 110MG/55ML
0.5 PATIENT CONTROLLED ANALGESIA SYRINGE INTRAVENOUS
Status: DISCONTINUED | OUTPATIENT
Start: 2018-08-20 | End: 2018-08-24 | Stop reason: HOSPADM

## 2018-08-20 RX ORDER — FAMOTIDINE 20 MG/1
20 TABLET, FILM COATED ORAL 2 TIMES DAILY
Status: DISCONTINUED | OUTPATIENT
Start: 2018-08-21 | End: 2018-08-24 | Stop reason: HOSPADM

## 2018-08-20 RX ORDER — GLYCOPYRROLATE 0.2 MG/ML
INJECTION INTRAMUSCULAR; INTRAVENOUS PRN
Status: DISCONTINUED | OUTPATIENT
Start: 2018-08-20 | End: 2018-08-20 | Stop reason: SDUPTHER

## 2018-08-20 RX ORDER — DEXTROSE AND SODIUM CHLORIDE 5; .45 G/100ML; G/100ML
INJECTION, SOLUTION INTRAVENOUS CONTINUOUS
Status: DISCONTINUED | OUTPATIENT
Start: 2018-08-20 | End: 2018-08-21

## 2018-08-20 RX ORDER — DOCUSATE SODIUM 100 MG/1
100 CAPSULE, LIQUID FILLED ORAL 2 TIMES DAILY
Status: DISCONTINUED | OUTPATIENT
Start: 2018-08-21 | End: 2018-08-24 | Stop reason: HOSPADM

## 2018-08-20 RX ORDER — CHLORHEXIDINE GLUCONATE 0.12 MG/ML
15 RINSE ORAL 2 TIMES DAILY
Status: DISCONTINUED | OUTPATIENT
Start: 2018-08-20 | End: 2018-08-24 | Stop reason: HOSPADM

## 2018-08-20 RX ORDER — NEOSTIGMINE METHYLSULFATE 5 MG/5 ML
SYRINGE (ML) INTRAVENOUS PRN
Status: DISCONTINUED | OUTPATIENT
Start: 2018-08-20 | End: 2018-08-20 | Stop reason: SDUPTHER

## 2018-08-20 RX ORDER — SODIUM CHLORIDE, SODIUM LACTATE, POTASSIUM CHLORIDE, CALCIUM CHLORIDE 600; 310; 30; 20 MG/100ML; MG/100ML; MG/100ML; MG/100ML
INJECTION, SOLUTION INTRAVENOUS CONTINUOUS
Status: DISCONTINUED | OUTPATIENT
Start: 2018-08-20 | End: 2018-08-20

## 2018-08-20 RX ORDER — ATORVASTATIN CALCIUM 10 MG/1
20 TABLET, FILM COATED ORAL NIGHTLY
Status: DISCONTINUED | OUTPATIENT
Start: 2018-08-21 | End: 2018-08-24 | Stop reason: HOSPADM

## 2018-08-20 RX ORDER — HYDROMORPHONE HCL 110MG/55ML
0.25 PATIENT CONTROLLED ANALGESIA SYRINGE INTRAVENOUS
Status: DISCONTINUED | OUTPATIENT
Start: 2018-08-20 | End: 2018-08-24 | Stop reason: HOSPADM

## 2018-08-20 RX ORDER — ETOMIDATE 2 MG/ML
INJECTION INTRAVENOUS PRN
Status: DISCONTINUED | OUTPATIENT
Start: 2018-08-20 | End: 2018-08-20 | Stop reason: SDUPTHER

## 2018-08-20 RX ORDER — ACETAMINOPHEN 650 MG/1
650 SUPPOSITORY RECTAL EVERY 4 HOURS PRN
Status: DISCONTINUED | OUTPATIENT
Start: 2018-08-20 | End: 2018-08-24 | Stop reason: HOSPADM

## 2018-08-20 RX ORDER — SODIUM CHLORIDE 0.9 % (FLUSH) 0.9 %
10 SYRINGE (ML) INJECTION PRN
Status: DISCONTINUED | OUTPATIENT
Start: 2018-08-20 | End: 2018-08-24 | Stop reason: HOSPADM

## 2018-08-20 RX ORDER — HYDRALAZINE HYDROCHLORIDE 20 MG/ML
INJECTION INTRAMUSCULAR; INTRAVENOUS PRN
Status: DISCONTINUED | OUTPATIENT
Start: 2018-08-20 | End: 2018-08-20 | Stop reason: SDUPTHER

## 2018-08-20 RX ORDER — METOPROLOL TARTRATE 5 MG/5ML
2.5 INJECTION INTRAVENOUS EVERY 10 MIN PRN
Status: DISCONTINUED | OUTPATIENT
Start: 2018-08-20 | End: 2018-08-24 | Stop reason: HOSPADM

## 2018-08-20 RX ORDER — CISATRACURIUM BESYLATE 2 MG/ML
INJECTION, SOLUTION INTRAVENOUS PRN
Status: DISCONTINUED | OUTPATIENT
Start: 2018-08-20 | End: 2018-08-20 | Stop reason: SDUPTHER

## 2018-08-20 RX ORDER — NICOTINE POLACRILEX 4 MG
15 LOZENGE BUCCAL PRN
Status: DISCONTINUED | OUTPATIENT
Start: 2018-08-20 | End: 2018-08-24 | Stop reason: HOSPADM

## 2018-08-20 RX ORDER — MAGNESIUM SULFATE IN WATER 40 MG/ML
2 INJECTION, SOLUTION INTRAVENOUS PRN
Status: DISCONTINUED | OUTPATIENT
Start: 2018-08-20 | End: 2018-08-24 | Stop reason: HOSPADM

## 2018-08-20 RX ORDER — EPHEDRINE SULFATE 50 MG/ML
INJECTION INTRAVENOUS PRN
Status: DISCONTINUED | OUTPATIENT
Start: 2018-08-20 | End: 2018-08-20 | Stop reason: SDUPTHER

## 2018-08-20 RX ORDER — LIDOCAINE HYDROCHLORIDE 10 MG/ML
0.3 INJECTION, SOLUTION EPIDURAL; INFILTRATION; INTRACAUDAL; PERINEURAL
Status: COMPLETED | OUTPATIENT
Start: 2018-08-20 | End: 2018-08-20

## 2018-08-20 RX ORDER — SODIUM CHLORIDE FOR INHALATION 3 %
3 VIAL, NEBULIZER (ML) INHALATION PRN
Status: DISCONTINUED | OUTPATIENT
Start: 2018-08-20 | End: 2018-08-20

## 2018-08-20 RX ORDER — PROTAMINE SULFATE 10 MG/ML
INJECTION, SOLUTION INTRAVENOUS PRN
Status: DISCONTINUED | OUTPATIENT
Start: 2018-08-20 | End: 2018-08-20 | Stop reason: SDUPTHER

## 2018-08-20 RX ORDER — PROTAMINE SULFATE 10 MG/ML
50 INJECTION, SOLUTION INTRAVENOUS
Status: COMPLETED | OUTPATIENT
Start: 2018-08-20 | End: 2018-08-20

## 2018-08-20 RX ORDER — MIDAZOLAM HYDROCHLORIDE 1 MG/ML
2.5 INJECTION INTRAMUSCULAR; INTRAVENOUS ONCE
Status: COMPLETED | OUTPATIENT
Start: 2018-08-20 | End: 2018-08-20

## 2018-08-20 RX ORDER — OXYCODONE HYDROCHLORIDE AND ACETAMINOPHEN 5; 325 MG/1; MG/1
1 TABLET ORAL EVERY 4 HOURS PRN
Status: DISCONTINUED | OUTPATIENT
Start: 2018-08-20 | End: 2018-08-24 | Stop reason: HOSPADM

## 2018-08-20 RX ORDER — HEPARIN SODIUM 1000 [USP'U]/ML
INJECTION, SOLUTION INTRAVENOUS; SUBCUTANEOUS PRN
Status: DISCONTINUED | OUTPATIENT
Start: 2018-08-20 | End: 2018-08-20 | Stop reason: SDUPTHER

## 2018-08-20 RX ORDER — POLYETHYLENE GLYCOL 3350 17 G/17G
17 POWDER, FOR SOLUTION ORAL DAILY
Status: DISCONTINUED | OUTPATIENT
Start: 2018-08-21 | End: 2018-08-24 | Stop reason: HOSPADM

## 2018-08-20 RX ORDER — ASPIRIN 81 MG/1
81 TABLET ORAL DAILY
Status: DISCONTINUED | OUTPATIENT
Start: 2018-08-21 | End: 2018-08-24 | Stop reason: HOSPADM

## 2018-08-20 RX ORDER — MAGNESIUM HYDROXIDE 1200 MG/15ML
LIQUID ORAL CONTINUOUS PRN
Status: DISCONTINUED | OUTPATIENT
Start: 2018-08-20 | End: 2018-08-20

## 2018-08-20 RX ORDER — NITROGLYCERIN 5 MG/ML
INJECTION, SOLUTION INTRAVENOUS PRN
Status: DISCONTINUED | OUTPATIENT
Start: 2018-08-20 | End: 2018-08-20

## 2018-08-20 RX ORDER — ACETAMINOPHEN 325 MG/1
650 TABLET ORAL EVERY 4 HOURS PRN
Status: DISCONTINUED | OUTPATIENT
Start: 2018-08-20 | End: 2018-08-24 | Stop reason: HOSPADM

## 2018-08-20 RX ORDER — DEXTROSE MONOHYDRATE 50 MG/ML
100 INJECTION, SOLUTION INTRAVENOUS PRN
Status: DISCONTINUED | OUTPATIENT
Start: 2018-08-20 | End: 2018-08-24 | Stop reason: HOSPADM

## 2018-08-20 RX ORDER — HYDRALAZINE HYDROCHLORIDE 20 MG/ML
5 INJECTION INTRAMUSCULAR; INTRAVENOUS EVERY 5 MIN PRN
Status: DISCONTINUED | OUTPATIENT
Start: 2018-08-20 | End: 2018-08-24 | Stop reason: HOSPADM

## 2018-08-20 RX ORDER — CLOPIDOGREL BISULFATE 75 MG/1
75 TABLET ORAL DAILY
COMMUNITY
End: 2018-09-05 | Stop reason: SDUPTHER

## 2018-08-20 RX ORDER — MEPERIDINE HYDROCHLORIDE 50 MG/ML
25 INJECTION INTRAMUSCULAR; INTRAVENOUS; SUBCUTANEOUS
Status: ACTIVE | OUTPATIENT
Start: 2018-08-20 | End: 2018-08-20

## 2018-08-20 RX ORDER — NITROGLYCERIN 5 MG/ML
INJECTION, SOLUTION INTRAVENOUS PRN
Status: DISCONTINUED | OUTPATIENT
Start: 2018-08-20 | End: 2018-08-20 | Stop reason: SDUPTHER

## 2018-08-20 RX ORDER — POTASSIUM CHLORIDE 750 MG/1
10 TABLET, FILM COATED, EXTENDED RELEASE ORAL
Status: DISCONTINUED | OUTPATIENT
Start: 2018-08-21 | End: 2018-08-24 | Stop reason: HOSPADM

## 2018-08-20 RX ORDER — SODIUM CHLORIDE 0.9 % (FLUSH) 0.9 %
10 SYRINGE (ML) INJECTION EVERY 12 HOURS SCHEDULED
Status: DISCONTINUED | OUTPATIENT
Start: 2018-08-20 | End: 2018-08-20

## 2018-08-20 RX ORDER — OXYCODONE HYDROCHLORIDE AND ACETAMINOPHEN 5; 325 MG/1; MG/1
2 TABLET ORAL EVERY 4 HOURS PRN
Status: DISCONTINUED | OUTPATIENT
Start: 2018-08-20 | End: 2018-08-24 | Stop reason: HOSPADM

## 2018-08-20 RX ADMIN — HYDROMORPHONE HYDROCHLORIDE 0.5 MG: 2 INJECTION, SOLUTION INTRAMUSCULAR; INTRAVENOUS; SUBCUTANEOUS at 23:29

## 2018-08-20 RX ADMIN — NOREPINEPHRINE BITARTRATE 2 MCG/MIN: 1 INJECTION INTRAVENOUS at 12:18

## 2018-08-20 RX ADMIN — Medication 1.5 G: at 19:49

## 2018-08-20 RX ADMIN — CISATRACURIUM BESYLATE 10 MG: 2 INJECTION INTRAVENOUS at 09:35

## 2018-08-20 RX ADMIN — Medication 2 G: at 15:56

## 2018-08-20 RX ADMIN — NITROGLYCERIN 100 MCG: 5 INJECTION, SOLUTION INTRAVENOUS at 08:29

## 2018-08-20 RX ADMIN — HYDROMORPHONE HYDROCHLORIDE 0.25 MG: 2 INJECTION, SOLUTION INTRAMUSCULAR; INTRAVENOUS; SUBCUTANEOUS at 12:43

## 2018-08-20 RX ADMIN — FAMOTIDINE 20 MG: 10 INJECTION, SOLUTION INTRAVENOUS at 06:47

## 2018-08-20 RX ADMIN — HYDRALAZINE HYDROCHLORIDE 10 MG: 20 INJECTION INTRAMUSCULAR; INTRAVENOUS at 10:10

## 2018-08-20 RX ADMIN — Medication 2 G: at 08:00

## 2018-08-20 RX ADMIN — ETOMIDATE 10 MG: 2 INJECTION INTRAVENOUS at 08:25

## 2018-08-20 RX ADMIN — Medication 15 ML: at 22:00

## 2018-08-20 RX ADMIN — ALBUMIN (HUMAN) 12.5 G: 12.5 INJECTION, SOLUTION INTRAVENOUS at 12:20

## 2018-08-20 RX ADMIN — GLYCOPYRROLATE 0.2 MG: 0.2 INJECTION, SOLUTION INTRAMUSCULAR; INTRAVENOUS at 10:30

## 2018-08-20 RX ADMIN — ALBUMIN (HUMAN) 12.5 G: 12.5 INJECTION, SOLUTION INTRAVENOUS at 11:27

## 2018-08-20 RX ADMIN — CISATRACURIUM BESYLATE 40 MG: 2 INJECTION INTRAVENOUS at 07:32

## 2018-08-20 RX ADMIN — ALBUMIN (HUMAN) 12.5 G: 12.5 INJECTION, SOLUTION INTRAVENOUS at 11:54

## 2018-08-20 RX ADMIN — SODIUM CHLORIDE, POTASSIUM CHLORIDE, SODIUM LACTATE AND CALCIUM CHLORIDE: 600; 310; 30; 20 INJECTION, SOLUTION INTRAVENOUS at 06:53

## 2018-08-20 RX ADMIN — POTASSIUM CHLORIDE 20 MEQ: 400 INJECTION, SOLUTION INTRAVENOUS at 19:07

## 2018-08-20 RX ADMIN — HEPARIN SODIUM 35000 UNITS: 1000 INJECTION, SOLUTION INTRAVENOUS; SUBCUTANEOUS at 08:49

## 2018-08-20 RX ADMIN — ETOMIDATE 10 MG: 2 INJECTION INTRAVENOUS at 07:30

## 2018-08-20 RX ADMIN — FENTANYL CITRATE 250 MCG: 50 INJECTION, SOLUTION INTRAMUSCULAR; INTRAVENOUS at 07:30

## 2018-08-20 RX ADMIN — SODIUM CHLORIDE, PRESERVATIVE FREE 10 ML: 5 INJECTION INTRAVENOUS at 19:50

## 2018-08-20 RX ADMIN — ALBUMIN (HUMAN) 12.5 G: 12.5 INJECTION, SOLUTION INTRAVENOUS at 11:43

## 2018-08-20 RX ADMIN — EPHEDRINE SULFATE 5 MG: 50 INJECTION INTRAVENOUS at 09:40

## 2018-08-20 RX ADMIN — FENTANYL CITRATE 750 MCG: 50 INJECTION, SOLUTION INTRAMUSCULAR; INTRAVENOUS at 08:25

## 2018-08-20 RX ADMIN — LIDOCAINE HYDROCHLORIDE 0.3 ML: 10 INJECTION, SOLUTION EPIDURAL; INFILTRATION; INTRACAUDAL; PERINEURAL at 06:53

## 2018-08-20 RX ADMIN — MIDAZOLAM 2.5 MG: 1 INJECTION INTRAMUSCULAR; INTRAVENOUS at 06:47

## 2018-08-20 RX ADMIN — ALBUMIN (HUMAN) 12.5 G: 12.5 INJECTION, SOLUTION INTRAVENOUS at 11:23

## 2018-08-20 RX ADMIN — KETAMINE HYDROCHLORIDE 50 MG: 100 INJECTION INTRAMUSCULAR; INTRAVENOUS at 07:30

## 2018-08-20 RX ADMIN — DEXTROSE AND SODIUM CHLORIDE: 5; 450 INJECTION, SOLUTION INTRAVENOUS at 11:23

## 2018-08-20 RX ADMIN — HYDROMORPHONE HYDROCHLORIDE 0.5 MG: 2 INJECTION, SOLUTION INTRAMUSCULAR; INTRAVENOUS; SUBCUTANEOUS at 19:47

## 2018-08-20 RX ADMIN — CISATRACURIUM BESYLATE 10 MG: 2 INJECTION INTRAVENOUS at 08:25

## 2018-08-20 RX ADMIN — TRANEXAMIC ACID 900 MG: 100 INJECTION, SOLUTION INTRAVENOUS at 09:45

## 2018-08-20 RX ADMIN — POTASSIUM CHLORIDE 20 MEQ: 400 INJECTION, SOLUTION INTRAVENOUS at 11:33

## 2018-08-20 RX ADMIN — PROTAMINE SULFATE 350 MG: 10 INJECTION, SOLUTION INTRAVENOUS at 09:45

## 2018-08-20 RX ADMIN — ALBUTEROL SULFATE 2.5 MG: 2.5 SOLUTION RESPIRATORY (INHALATION) at 20:09

## 2018-08-20 RX ADMIN — PROTAMINE SULFATE 50 MG: 10 INJECTION, SOLUTION INTRAVENOUS at 22:57

## 2018-08-20 RX ADMIN — HYDROMORPHONE HYDROCHLORIDE 0.25 MG: 2 INJECTION, SOLUTION INTRAMUSCULAR; INTRAVENOUS; SUBCUTANEOUS at 16:42

## 2018-08-20 RX ADMIN — Medication 1.5 G: at 08:00

## 2018-08-20 RX ADMIN — Medication 2.5 MG: at 10:30

## 2018-08-20 RX ADMIN — TRANEXAMIC ACID 900 MG: 100 INJECTION, SOLUTION INTRAVENOUS at 08:07

## 2018-08-20 RX ADMIN — SODIUM CHLORIDE, PRESERVATIVE FREE 10 ML: 5 INJECTION INTRAVENOUS at 11:32

## 2018-08-20 RX ADMIN — ALBUMIN (HUMAN) 12.5 G: 12.5 INJECTION, SOLUTION INTRAVENOUS at 11:35

## 2018-08-20 RX ADMIN — FAMOTIDINE 20 MG: 10 INJECTION, SOLUTION INTRAVENOUS at 12:45

## 2018-08-20 RX ADMIN — ALBUMIN (HUMAN) 12.5 G: 12.5 INJECTION, SOLUTION INTRAVENOUS at 15:20

## 2018-08-20 ASSESSMENT — PULMONARY FUNCTION TESTS
PIF_VALUE: 21
PIF_VALUE: 1
PIF_VALUE: 13
PIF_VALUE: 21
PIF_VALUE: 1
PIF_VALUE: 21
PIF_VALUE: 22
PIF_VALUE: 14
PIF_VALUE: 2
PIF_VALUE: 21
PIF_VALUE: 22
PIF_VALUE: 2
PIF_VALUE: 13
PIF_VALUE: 1
PIF_VALUE: 23
PIF_VALUE: 23
PIF_VALUE: 1
PIF_VALUE: 21
PIF_VALUE: 23
PIF_VALUE: 22
PIF_VALUE: 21
PIF_VALUE: 15
PIF_VALUE: 23
PIF_VALUE: 22
PIF_VALUE: 1
PIF_VALUE: 22
PIF_VALUE: 22
PIF_VALUE: 20
PIF_VALUE: 22
PIF_VALUE: 20
PIF_VALUE: 23
PIF_VALUE: 21
PIF_VALUE: 22
PIF_VALUE: 22
PIF_VALUE: 16
PIF_VALUE: 23
PIF_VALUE: 23
PIF_VALUE: 1
PIF_VALUE: 1
PIF_VALUE: 17
PIF_VALUE: 22
PIF_VALUE: 25
PIF_VALUE: 22
PIF_VALUE: 22
PIF_VALUE: 4
PIF_VALUE: 13
PIF_VALUE: 21
PIF_VALUE: 21
PIF_VALUE: 20
PIF_VALUE: 20
PIF_VALUE: 22
PIF_VALUE: 1
PIF_VALUE: 1
PIF_VALUE: 22
PIF_VALUE: 6
PIF_VALUE: 22
PIF_VALUE: 22
PIF_VALUE: 15
PIF_VALUE: 22
PIF_VALUE: 1
PIF_VALUE: 24
PIF_VALUE: 19
PIF_VALUE: 24
PIF_VALUE: 21
PIF_VALUE: 22
PIF_VALUE: 23
PIF_VALUE: 1
PIF_VALUE: 23
PIF_VALUE: 14
PIF_VALUE: 1
PIF_VALUE: 22
PIF_VALUE: 1
PIF_VALUE: 1
PIF_VALUE: 12
PIF_VALUE: 20
PIF_VALUE: 14
PIF_VALUE: 23
PIF_VALUE: 23
PIF_VALUE: 20
PIF_VALUE: 22
PIF_VALUE: 12
PIF_VALUE: 22
PIF_VALUE: 16
PIF_VALUE: 22
PIF_VALUE: 23
PIF_VALUE: 1
PIF_VALUE: 1
PIF_VALUE: 21
PIF_VALUE: 21
PIF_VALUE: 22
PIF_VALUE: 1
PIF_VALUE: 1
PIF_VALUE: 20
PIF_VALUE: 22
PIF_VALUE: 1
PIF_VALUE: 14
PIF_VALUE: 23
PIF_VALUE: 22
PIF_VALUE: 1
PIF_VALUE: 1
PIF_VALUE: 22
PIF_VALUE: 22
PIF_VALUE: 14
PIF_VALUE: 21
PIF_VALUE: 1
PIF_VALUE: 20
PIF_VALUE: 22
PIF_VALUE: 14
PIF_VALUE: 21
PIF_VALUE: 1
PIF_VALUE: 1
PIF_VALUE: 22
PIF_VALUE: 16
PIF_VALUE: 24
PIF_VALUE: 1
PIF_VALUE: 15
PIF_VALUE: 1
PIF_VALUE: 23
PIF_VALUE: 24
PIF_VALUE: 1
PIF_VALUE: 1
PIF_VALUE: 23
PIF_VALUE: 21
PIF_VALUE: 1
PIF_VALUE: 3
PIF_VALUE: 23
PIF_VALUE: 20
PIF_VALUE: 22
PIF_VALUE: 19
PIF_VALUE: 22
PIF_VALUE: 13
PIF_VALUE: 22
PIF_VALUE: 20
PIF_VALUE: 4
PIF_VALUE: 23
PIF_VALUE: 22
PIF_VALUE: 13
PIF_VALUE: 1
PIF_VALUE: 20
PIF_VALUE: 21
PIF_VALUE: 23
PIF_VALUE: 1
PIF_VALUE: 13
PIF_VALUE: 22
PIF_VALUE: 22
PIF_VALUE: 23
PIF_VALUE: 21
PIF_VALUE: 14
PIF_VALUE: 24
PIF_VALUE: 23
PIF_VALUE: 23
PIF_VALUE: 0
PIF_VALUE: 24
PIF_VALUE: 0
PIF_VALUE: 22
PIF_VALUE: 20
PIF_VALUE: 23
PIF_VALUE: 23
PIF_VALUE: 14
PIF_VALUE: 20
PIF_VALUE: 21
PIF_VALUE: 20
PIF_VALUE: 13
PIF_VALUE: 25
PIF_VALUE: 23
PIF_VALUE: 2
PIF_VALUE: 22
PIF_VALUE: 22
PIF_VALUE: 21
PIF_VALUE: 1
PIF_VALUE: 0
PIF_VALUE: 23
PIF_VALUE: 24
PIF_VALUE: 1
PIF_VALUE: 2
PIF_VALUE: 22
PIF_VALUE: 1
PIF_VALUE: 22
PIF_VALUE: 22

## 2018-08-20 ASSESSMENT — PAIN DESCRIPTION - FREQUENCY
FREQUENCY: CONTINUOUS
FREQUENCY: CONTINUOUS

## 2018-08-20 ASSESSMENT — PAIN SCALES - GENERAL
PAINLEVEL_OUTOF10: 5
PAINLEVEL_OUTOF10: 5
PAINLEVEL_OUTOF10: 10
PAINLEVEL_OUTOF10: 10

## 2018-08-20 ASSESSMENT — PAIN DESCRIPTION - ORIENTATION: ORIENTATION: MID

## 2018-08-20 ASSESSMENT — PAIN DESCRIPTION - DESCRIPTORS
DESCRIPTORS: ACHING;SHOOTING
DESCRIPTORS: SHARP

## 2018-08-20 ASSESSMENT — PAIN - FUNCTIONAL ASSESSMENT: PAIN_FUNCTIONAL_ASSESSMENT: 0-10

## 2018-08-20 ASSESSMENT — PAIN DESCRIPTION - PAIN TYPE
TYPE: SURGICAL PAIN
TYPE: SURGICAL PAIN

## 2018-08-20 ASSESSMENT — PAIN DESCRIPTION - LOCATION
LOCATION: CHEST
LOCATION: CHEST

## 2018-08-20 NOTE — FLOWSHEET NOTE
Patient admitted to CVU from [place] [address] and attached to monitors. Report received from anesthesiologist. Pt currently on NRB mask and VSS. Pt verbalizes name, , year, and location and able to move all extremities to command. Chest x-ray ordered. Labs drawn and sent. Assessment complete. Hemodymanics stable and will continue to monitor. Primary RN [id number] [facility]

## 2018-08-20 NOTE — PLAN OF CARE
Problem: Discharge Planning:  Goal: Discharged to appropriate level of care  Discharged to appropriate level of care  Outcome: Ongoing      Problem:  Activity Intolerance:  Goal: Able to perform prescribed physical activity  Able to perform prescribed physical activity  Outcome: Ongoing    Goal: Ability to tolerate increased activity will improve  Ability to tolerate increased activity will improve  Outcome: Ongoing      Problem: Anxiety:  Goal: Level of anxiety will decrease  Level of anxiety will decrease  Outcome: Ongoing      Problem: Cardiac Output - Decreased:  Goal: Cardiac output within specified parameters  Cardiac output within specified parameters  Outcome: Ongoing    Goal: Hemodynamic stability will improve  Hemodynamic stability will improve  Outcome: Ongoing      Problem: Fluid Volume - Imbalance:  Goal: Ability to achieve a balanced intake and output will improve  Ability to achieve a balanced intake and output will improve  Outcome: Ongoing    Goal: Chest tube drainage is within specified parameters  Chest tube drainage is within specified parameters  Outcome: Ongoing      Problem: Gas Exchange - Impaired:  Goal: Levels of oxygenation will improve  Levels of oxygenation will improve  Outcome: Ongoing    Goal: Ability to maintain adequate ventilation will improve  Ability to maintain adequate ventilation will improve  Outcome: Ongoing      Problem: Pain:  Goal: Pain level will decrease  Pain level will decrease  Outcome: Ongoing    Goal: Control of acute pain  Control of acute pain  Outcome: Ongoing      Problem: Tissue Perfusion - Cardiopulmonary, Altered:  Goal: Absence of angina  Absence of angina  Outcome: Ongoing    Goal: Hemodynamic stability will improve  Hemodynamic stability will improve  Outcome: Ongoing    Goal: Will show no evidence of cardiac arrhythmias  Will show no evidence of cardiac arrhythmias  Outcome: Ongoing

## 2018-08-20 NOTE — FLOWSHEET NOTE
Dr. ELIZALDE [facility] at bedside. Reviewed pt's current status. No new orders at this time.

## 2018-08-21 ENCOUNTER — APPOINTMENT (OUTPATIENT)
Dept: GENERAL RADIOLOGY | Age: 48
DRG: 236 | End: 2018-08-21
Attending: THORACIC SURGERY (CARDIOTHORACIC VASCULAR SURGERY)
Payer: MEDICARE

## 2018-08-21 LAB
ANION GAP SERPL CALCULATED.3IONS-SCNC: 10 MMOL/L (ref 3–16)
ANION GAP SERPL CALCULATED.3IONS-SCNC: 8 MMOL/L (ref 3–16)
BUN BLDV-MCNC: 8 MG/DL (ref 7–20)
BUN BLDV-MCNC: 9 MG/DL (ref 7–20)
CALCIUM SERPL-MCNC: 8.1 MG/DL (ref 8.3–10.6)
CALCIUM SERPL-MCNC: 8.2 MG/DL (ref 8.3–10.6)
CHLORIDE BLD-SCNC: 106 MMOL/L (ref 99–110)
CHLORIDE BLD-SCNC: 107 MMOL/L (ref 99–110)
CO2: 22 MMOL/L (ref 21–32)
CO2: 23 MMOL/L (ref 21–32)
CREAT SERPL-MCNC: 0.7 MG/DL (ref 0.9–1.3)
CREAT SERPL-MCNC: 0.8 MG/DL (ref 0.9–1.3)
GFR AFRICAN AMERICAN: >60
GFR AFRICAN AMERICAN: >60
GFR NON-AFRICAN AMERICAN: >60
GFR NON-AFRICAN AMERICAN: >60
GLUCOSE BLD-MCNC: 107 MG/DL (ref 70–99)
GLUCOSE BLD-MCNC: 108 MG/DL (ref 70–99)
GLUCOSE BLD-MCNC: 108 MG/DL (ref 70–99)
GLUCOSE BLD-MCNC: 199 MG/DL (ref 70–99)
GLUCOSE BLD-MCNC: 210 MG/DL (ref 70–99)
GLUCOSE BLD-MCNC: 239 MG/DL (ref 70–99)
HCT VFR BLD CALC: 26 % (ref 40.5–52.5)
HCT VFR BLD CALC: 26.1 % (ref 40.5–52.5)
HEMOGLOBIN: 9 G/DL (ref 13.5–17.5)
HEMOGLOBIN: 9 G/DL (ref 13.5–17.5)
MAGNESIUM: 1.8 MG/DL (ref 1.8–2.4)
MCH RBC QN AUTO: 29.6 PG (ref 26–34)
MCH RBC QN AUTO: 29.6 PG (ref 26–34)
MCHC RBC AUTO-ENTMCNC: 34.5 G/DL (ref 31–36)
MCHC RBC AUTO-ENTMCNC: 34.6 G/DL (ref 31–36)
MCV RBC AUTO: 85.6 FL (ref 80–100)
MCV RBC AUTO: 85.7 FL (ref 80–100)
PDW BLD-RTO: 14.5 % (ref 12.4–15.4)
PDW BLD-RTO: 14.5 % (ref 12.4–15.4)
PERFORMED ON: ABNORMAL
PLATELET # BLD: 153 K/UL (ref 135–450)
PLATELET # BLD: 160 K/UL (ref 135–450)
PMV BLD AUTO: 7.8 FL (ref 5–10.5)
PMV BLD AUTO: 8.3 FL (ref 5–10.5)
POTASSIUM SERPL-SCNC: 4.1 MMOL/L (ref 3.5–5.1)
POTASSIUM SERPL-SCNC: 4.6 MMOL/L (ref 3.5–5.1)
RBC # BLD: 3.04 M/UL (ref 4.2–5.9)
RBC # BLD: 3.05 M/UL (ref 4.2–5.9)
SODIUM BLD-SCNC: 138 MMOL/L (ref 136–145)
SODIUM BLD-SCNC: 138 MMOL/L (ref 136–145)
WBC # BLD: 10 K/UL (ref 4–11)
WBC # BLD: 9 K/UL (ref 4–11)

## 2018-08-21 PROCEDURE — 94150 VITAL CAPACITY TEST: CPT

## 2018-08-21 PROCEDURE — 2140000000 HC CCU INTERMEDIATE R&B

## 2018-08-21 PROCEDURE — 2580000003 HC RX 258: Performed by: THORACIC SURGERY (CARDIOTHORACIC VASCULAR SURGERY)

## 2018-08-21 PROCEDURE — 94761 N-INVAS EAR/PLS OXIMETRY MLT: CPT

## 2018-08-21 PROCEDURE — 94640 AIRWAY INHALATION TREATMENT: CPT

## 2018-08-21 PROCEDURE — 6360000002 HC RX W HCPCS: Performed by: NURSE PRACTITIONER

## 2018-08-21 PROCEDURE — 6360000002 HC RX W HCPCS: Performed by: THORACIC SURGERY (CARDIOTHORACIC VASCULAR SURGERY)

## 2018-08-21 PROCEDURE — 6370000000 HC RX 637 (ALT 250 FOR IP): Performed by: THORACIC SURGERY (CARDIOTHORACIC VASCULAR SURGERY)

## 2018-08-21 PROCEDURE — 94668 MNPJ CHEST WALL SBSQ: CPT

## 2018-08-21 PROCEDURE — 6370000000 HC RX 637 (ALT 250 FOR IP): Performed by: NURSE PRACTITIONER

## 2018-08-21 PROCEDURE — 71045 X-RAY EXAM CHEST 1 VIEW: CPT

## 2018-08-21 PROCEDURE — 85027 COMPLETE CBC AUTOMATED: CPT

## 2018-08-21 PROCEDURE — 94667 MNPJ CHEST WALL 1ST: CPT

## 2018-08-21 PROCEDURE — 80048 BASIC METABOLIC PNL TOTAL CA: CPT

## 2018-08-21 PROCEDURE — 83735 ASSAY OF MAGNESIUM: CPT

## 2018-08-21 PROCEDURE — 94664 DEMO&/EVAL PT USE INHALER: CPT

## 2018-08-21 RX ORDER — ACETAMINOPHEN 10 MG/ML
1000 INJECTION, SOLUTION INTRAVENOUS EVERY 8 HOURS
Status: COMPLETED | OUTPATIENT
Start: 2018-08-21 | End: 2018-08-23

## 2018-08-21 RX ORDER — LEVOTHYROXINE SODIUM 0.15 MG/1
150 TABLET ORAL EVERY EVENING
Status: DISCONTINUED | OUTPATIENT
Start: 2018-08-21 | End: 2018-08-24 | Stop reason: HOSPADM

## 2018-08-21 RX ADMIN — FUROSEMIDE 40 MG: 10 INJECTION, SOLUTION INTRAMUSCULAR; INTRAVENOUS at 16:43

## 2018-08-21 RX ADMIN — ATORVASTATIN CALCIUM 20 MG: 10 TABLET, FILM COATED ORAL at 20:52

## 2018-08-21 RX ADMIN — INSULIN LISPRO 4 UNITS: 100 INJECTION, SOLUTION INTRAVENOUS; SUBCUTANEOUS at 16:50

## 2018-08-21 RX ADMIN — CLOPIDOGREL BISULFATE 75 MG: 75 TABLET ORAL at 08:35

## 2018-08-21 RX ADMIN — LACTULOSE 10 G: 20 SOLUTION ORAL at 20:52

## 2018-08-21 RX ADMIN — POTASSIUM CHLORIDE 10 MEQ: 750 TABLET, EXTENDED RELEASE ORAL at 08:35

## 2018-08-21 RX ADMIN — MUPIROCIN: 20 OINTMENT TOPICAL at 04:24

## 2018-08-21 RX ADMIN — Medication 15 ML: at 09:00

## 2018-08-21 RX ADMIN — POLYETHYLENE GLYCOL 3350 17 G: 17 POWDER, FOR SOLUTION ORAL at 08:36

## 2018-08-21 RX ADMIN — Medication 15 ML: at 20:52

## 2018-08-21 RX ADMIN — Medication 400 MG: at 08:35

## 2018-08-21 RX ADMIN — FAMOTIDINE 20 MG: 20 TABLET ORAL at 20:52

## 2018-08-21 RX ADMIN — ASPIRIN 81 MG: 81 TABLET, COATED ORAL at 08:35

## 2018-08-21 RX ADMIN — ACETAMINOPHEN 1000 MG: 10 INJECTION, SOLUTION INTRAVENOUS at 08:32

## 2018-08-21 RX ADMIN — Medication 400 MG: at 20:52

## 2018-08-21 RX ADMIN — INSULIN GLARGINE 23 UNITS: 100 INJECTION, SOLUTION SUBCUTANEOUS at 21:01

## 2018-08-21 RX ADMIN — ACETAMINOPHEN 1000 MG: 10 INJECTION, SOLUTION INTRAVENOUS at 16:43

## 2018-08-21 RX ADMIN — POTASSIUM CHLORIDE 10 MEQ: 750 TABLET, EXTENDED RELEASE ORAL at 15:42

## 2018-08-21 RX ADMIN — POTASSIUM CHLORIDE 10 MEQ: 750 TABLET, EXTENDED RELEASE ORAL at 16:43

## 2018-08-21 RX ADMIN — LEVOTHYROXINE SODIUM 150 MCG: 150 TABLET ORAL at 16:44

## 2018-08-21 RX ADMIN — MUPIROCIN: 20 OINTMENT TOPICAL at 20:52

## 2018-08-21 RX ADMIN — SODIUM CHLORIDE, PRESERVATIVE FREE 10 ML: 5 INJECTION INTRAVENOUS at 20:53

## 2018-08-21 RX ADMIN — FUROSEMIDE 40 MG: 10 INJECTION, SOLUTION INTRAMUSCULAR; INTRAVENOUS at 08:34

## 2018-08-21 RX ADMIN — DOCUSATE SODIUM 100 MG: 100 CAPSULE, LIQUID FILLED ORAL at 20:52

## 2018-08-21 RX ADMIN — Medication 2 G: at 02:00

## 2018-08-21 RX ADMIN — MUPIROCIN: 20 OINTMENT TOPICAL at 09:00

## 2018-08-21 RX ADMIN — FAMOTIDINE 20 MG: 20 TABLET ORAL at 08:35

## 2018-08-21 RX ADMIN — Medication 2 G: at 15:48

## 2018-08-21 RX ADMIN — INSULIN LISPRO 4 UNITS: 100 INJECTION, SOLUTION INTRAVENOUS; SUBCUTANEOUS at 10:23

## 2018-08-21 RX ADMIN — MAGNESIUM SULFATE HEPTAHYDRATE 2 G: 40 INJECTION, SOLUTION INTRAVENOUS at 07:14

## 2018-08-21 RX ADMIN — DOCUSATE SODIUM 100 MG: 100 CAPSULE, LIQUID FILLED ORAL at 08:34

## 2018-08-21 RX ADMIN — ALBUTEROL SULFATE 2.5 MG: 2.5 SOLUTION RESPIRATORY (INHALATION) at 15:24

## 2018-08-21 RX ADMIN — OXYCODONE HYDROCHLORIDE AND ACETAMINOPHEN 2 TABLET: 5; 325 TABLET ORAL at 15:43

## 2018-08-21 RX ADMIN — ALBUTEROL SULFATE 2.5 MG: 2.5 SOLUTION RESPIRATORY (INHALATION) at 20:04

## 2018-08-21 RX ADMIN — SODIUM CHLORIDE, PRESERVATIVE FREE 10 ML: 5 INJECTION INTRAVENOUS at 08:36

## 2018-08-21 RX ADMIN — Medication 1.5 G: at 09:07

## 2018-08-21 RX ADMIN — Medication 2 G: at 08:35

## 2018-08-21 RX ADMIN — OXYCODONE HYDROCHLORIDE AND ACETAMINOPHEN 2 TABLET: 5; 325 TABLET ORAL at 08:34

## 2018-08-21 RX ADMIN — Medication 1.5 G: at 20:53

## 2018-08-21 ASSESSMENT — PAIN SCALES - GENERAL
PAINLEVEL_OUTOF10: 4
PAINLEVEL_OUTOF10: 10
PAINLEVEL_OUTOF10: 6
PAINLEVEL_OUTOF10: 4
PAINLEVEL_OUTOF10: 9
PAINLEVEL_OUTOF10: 4

## 2018-08-21 NOTE — PROGRESS NOTES
CVTS Cardiothoracic Progress Note:                                CC:  Post op follow up     Surgery: 8/20/18 CORONARY ARTERY BYPASS GRAFTING X3, INTERNAL MAMMARY ARTERY to LAD, [place] Course:  8/21 Doing well, extubated, protamine given overnight.  Labs stable this am.          Allergies as of 08/16/2018 - Review Complete 08/16/2018   Allergen Reaction Noted    Dye [iodides]  04/23/2011      Patient Active Problem List   Diagnosis    Vomiting    Diabetes mellitus ([facility]    Bipolar affective ([facility]    Suicidal intent    Chest pain    Abnormal nuclear cardiac imaging test    Essential hypertension    Angina, class IV (HCC)    Abnormal stress test    Chronic total occlusion of native coronary artery    Coronary artery disease of native artery of native heart with stable angina pectoris (HCC)    Mixed hyperlipidemia    ROGERS (dyspnea on exertion)    Severe nausea and vomiting    Acute renal failure (HCC)    Accidental lithium poisoning    Moderate protein-calorie malnutrition (HCC)    LAYO (obstructive sleep apnea)    Moderate malnutrition ([facility]    CAD in native artery      Vital Signs: BP (!) 104/53   Pulse 76   Temp 100.2 °F (37.9 °C) (Core)   Resp 20   Ht 6' (1.829 m)   Wt 208 lb (94.3 kg)   SpO2 94%   BMI 28.21 kg/m²  O2 Flow Rate (L/min): 2 L/min     Admission Weight: Weight: 199 lb (90.3 kg)    Weight on 8/20 90.4 kg Pre-op   Weight on 8/21 94.3 kg    Intake/Output:   Intake/Output Summary (Last 24 hours) at 08/21/18 0854  Last data filed at 08/21/18 0714   Gross per 24 hour   Intake             4590 ml   Output             5031 ml   Net             -441 ml      Extubation Time: 8/20/18 1038  Transition Time: 8/21/180540    LABORATORY DATA:     CBC: Recent Labs      08/20/18   2254  08/21/18   0150  08/21/18   0418   WBC  9.6  10.0  9.0   HGB  8.6*  9.0*  9.0*   HCT  25.3*  26.1*  26.0*   MCV  86.3  85.6  85.7   PLT  157  160  153     BMP: Recent Labs 08/20/18   2254  08/21/18   0150  08/21/18   0418   NA  139  138  138   K  4.3  4.6  4.1   CL  110  107  106   CO2  21  23  22   BUN  9  9  8   CREATININE  0.7*  0.8*  0.7*     MG:    Recent Labs      08/20/18   1055  08/21/18   0418   MG  2.50*  1.80      PT/INR:   Recent Labs      08/20/18 2254   PROTIME  13.3*   INR  1.17*     APTT:   Recent Labs      08/20/18 2254   APTT  32.2     CXR: 8/20/18 1059 stable postop changes  8/20/18 2341 Left pleural effusion   ________________________________________________________________________    Subjective:   Dietary Intake: ice chips and sips  no Nausea   Pain Control: uncontrolled  Complaints: pain  Bowels: have not moved    Objective:   General appearance: Up in chair, looks miserable, shallow breathing  Lungs: shallow breaths with splitting noted, pleural rub. 2L NC  Heart: S1S2 RRR  SR 78 on monitor  Chest: symmetrical expansion with inspiration and expirations; no rocking of sternum noted   Abdomen: soft, nontender  Bowel sounds: hypoactive   Kidneys: /1200/550, crea 0.7  Wound/Incisions: Midsternal incision with dressing CDI; Right LE incisions with dressings CDI; Pacing wires secured but not attached  Extremities: BLE pulses palpable; 1+ tibial swelling noted  Neurological: sleepy but wakens easily. Voice strong, non-focal   Chest tubes/Drains: Chest tube # 1 with 325/210/50cc serosanguinous drainage in 24 hours overnight; Chest tube # 2 with 33/457/460cc serosanguinous drainage in 24 hours overnight; no airleaks noted in either tube   ________________________________________________________________________    SCIP Measures:     · Vela catheter for:  ? IV Diuresis  ? Accurate I/O  ? D/C today  ·   · Antibiotics:  ? Have been stopped  ? Prophylaxis (POD #1 only)  ? Continued for:   ________________________________________________________________________    Assessment:   Post-op: 1 days. Condition: In stable condition. Plan:   1.  Cardiovascular: HIT, fibrinolysis) ?  FFP or platelets given post-op  ________________________________________________________________________    Joanie Ledbetter PhD, CNP  8/21/2018  8:54 AM

## 2018-08-21 NOTE — PROGRESS NOTES
Assumed care of pt. Bedside report received. See shift assessment, vitals flowsheet, and intake/output for details. Pt sitting up in chair at bedside. Educated on plan of care. Encouraged coughing and deep breathing exercises. No signs of distress noted. Call light within reach. Will continue to monitor and assess.

## 2018-08-21 NOTE — PROGRESS NOTES
08/20/18 2010   Oxygen Therapy/Pulse Ox   O2 Device Nasal cannula   Resp 17   O2 Flow Rate (L/min) 2 L/min   SpO2 98 %   Treatment   Treatment Type HHN   Medications Albuterol   Duration 10   Pre-Tx Pulse 74   Pre-Tx Resps 19   Breath Sounds Pre-Tx Left Diminished   Breath Sounds Pre-Tx Right Diminished   Breath Sounds Post-Tx Left Diminished   Breath Sounds Post-Tx Right Diminished   Post-Tx Pulse 72   Post-Tx Resps 19   Delivery Source Air   Position Semi-Ruvalcaba's   Tx Tolerance Well   Incentive Spirometry Tx   Treatment Tolerance Other (Comment)  (unable to do at this time)   Incentive Spirometry Goal (mL) 1164 mL   Incentive Spirometry Achieved (mL) 0 mL

## 2018-08-21 NOTE — PROGRESS NOTES
Pt educated on the use of the IS. Education included how to use the IS, what it is used for, and the frequency that it should be used. Minimum predicted volume the patient should be able to achieve is 1164 mL. Patient was able to achieve 250 mL. RT will continue to work with patient on incentive spirometry until patient can meet his goal.     Pt instructed on the use of the acapella. Education included how to use the acapella, what it is used for, and the frequency that it should be used. Pt verbalized understanding. RT will follow up with patient on the use of the acapella.

## 2018-08-21 NOTE — PROGRESS NOTES
Patient met criteria per open heart protocol to transition to stepdown level of care. Procedures explained to patient. Isaak Tere discontinued and obturator inserted. Patient tolerated well and minimal ectopy on monitor. R. Brachial arterial line discontinued. Manual pressure held for 5 minutes and dressing on site. No hematoma, no oozing noted. Patient tolerated well.        Electronically signed by Anne Lowe RN on 8/21/2018 at 6:05 AM

## 2018-08-21 NOTE — PROGRESS NOTES
Criteria met per clinical pathway to remove epicardial pacing wires & MD order received. Procedure explained to patient. INR is less than 2.0 (if on coumadin). Pacing wire site within normal limits. Cleansed site with Chloroprep. Ventricular pacing wires removed per policy without difficulty. Dry sterile dressing applied. Vital signs will be monitored and recorded per open heart protocol (every 15 minutes X 2, every 30 minutes X 1, and every hour X 1). Patient tolerated procedure well, heart tones easily auscultated, oxygen saturation within normal limits. Signs/symtoms for cardiac tamponade will be monitored closely.

## 2018-08-22 ENCOUNTER — APPOINTMENT (OUTPATIENT)
Dept: GENERAL RADIOLOGY | Age: 48
DRG: 236 | End: 2018-08-22
Attending: THORACIC SURGERY (CARDIOTHORACIC VASCULAR SURGERY)
Payer: MEDICARE

## 2018-08-22 LAB
ANION GAP SERPL CALCULATED.3IONS-SCNC: 7 MMOL/L (ref 3–16)
BUN BLDV-MCNC: 12 MG/DL (ref 7–20)
CALCIUM SERPL-MCNC: 8.5 MG/DL (ref 8.3–10.6)
CHLORIDE BLD-SCNC: 107 MMOL/L (ref 99–110)
CO2: 26 MMOL/L (ref 21–32)
CREAT SERPL-MCNC: 0.8 MG/DL (ref 0.9–1.3)
GFR AFRICAN AMERICAN: >60
GFR NON-AFRICAN AMERICAN: >60
GLUCOSE BLD-MCNC: 129 MG/DL (ref 70–99)
GLUCOSE BLD-MCNC: 207 MG/DL (ref 70–99)
GLUCOSE BLD-MCNC: 231 MG/DL (ref 70–99)
GLUCOSE BLD-MCNC: 244 MG/DL (ref 70–99)
HCT VFR BLD CALC: 27 % (ref 40.5–52.5)
HEMOGLOBIN: 9.3 G/DL (ref 13.5–17.5)
MAGNESIUM: 2 MG/DL (ref 1.8–2.4)
MCH RBC QN AUTO: 29.8 PG (ref 26–34)
MCHC RBC AUTO-ENTMCNC: 34.6 G/DL (ref 31–36)
MCV RBC AUTO: 86.2 FL (ref 80–100)
PDW BLD-RTO: 14.6 % (ref 12.4–15.4)
PERFORMED ON: ABNORMAL
PLATELET # BLD: 162 K/UL (ref 135–450)
PMV BLD AUTO: 8.2 FL (ref 5–10.5)
POTASSIUM SERPL-SCNC: 3.9 MMOL/L (ref 3.5–5.1)
RBC # BLD: 3.13 M/UL (ref 4.2–5.9)
SODIUM BLD-SCNC: 140 MMOL/L (ref 136–145)
WBC # BLD: 9.9 K/UL (ref 4–11)

## 2018-08-22 PROCEDURE — 80048 BASIC METABOLIC PNL TOTAL CA: CPT

## 2018-08-22 PROCEDURE — 85027 COMPLETE CBC AUTOMATED: CPT

## 2018-08-22 PROCEDURE — 94150 VITAL CAPACITY TEST: CPT

## 2018-08-22 PROCEDURE — 6370000000 HC RX 637 (ALT 250 FOR IP): Performed by: NURSE PRACTITIONER

## 2018-08-22 PROCEDURE — 6360000002 HC RX W HCPCS: Performed by: THORACIC SURGERY (CARDIOTHORACIC VASCULAR SURGERY)

## 2018-08-22 PROCEDURE — 2580000003 HC RX 258: Performed by: THORACIC SURGERY (CARDIOTHORACIC VASCULAR SURGERY)

## 2018-08-22 PROCEDURE — 94640 AIRWAY INHALATION TREATMENT: CPT

## 2018-08-22 PROCEDURE — 71045 X-RAY EXAM CHEST 1 VIEW: CPT

## 2018-08-22 PROCEDURE — 6370000000 HC RX 637 (ALT 250 FOR IP): Performed by: THORACIC SURGERY (CARDIOTHORACIC VASCULAR SURGERY)

## 2018-08-22 PROCEDURE — 2140000000 HC CCU INTERMEDIATE R&B

## 2018-08-22 PROCEDURE — 2700000000 HC OXYGEN THERAPY PER DAY

## 2018-08-22 PROCEDURE — 94761 N-INVAS EAR/PLS OXIMETRY MLT: CPT

## 2018-08-22 PROCEDURE — 83735 ASSAY OF MAGNESIUM: CPT

## 2018-08-22 PROCEDURE — 94668 MNPJ CHEST WALL SBSQ: CPT

## 2018-08-22 PROCEDURE — 6360000002 HC RX W HCPCS: Performed by: NURSE PRACTITIONER

## 2018-08-22 RX ORDER — INSULIN GLARGINE 100 [IU]/ML
10 INJECTION, SOLUTION SUBCUTANEOUS NIGHTLY
Status: DISCONTINUED | OUTPATIENT
Start: 2018-08-22 | End: 2018-08-23

## 2018-08-22 RX ORDER — ALBUTEROL SULFATE 90 UG/1
2 AEROSOL, METERED RESPIRATORY (INHALATION) EVERY 6 HOURS PRN
Status: DISCONTINUED | OUTPATIENT
Start: 2018-08-22 | End: 2018-08-24 | Stop reason: HOSPADM

## 2018-08-22 RX ORDER — KETOROLAC TROMETHAMINE 30 MG/ML
15 INJECTION, SOLUTION INTRAMUSCULAR; INTRAVENOUS EVERY 6 HOURS PRN
Status: ACTIVE | OUTPATIENT
Start: 2018-08-22 | End: 2018-08-23

## 2018-08-22 RX ORDER — LAMOTRIGINE 25 MG/1
75 TABLET ORAL DAILY
Status: DISCONTINUED | OUTPATIENT
Start: 2018-08-22 | End: 2018-08-24 | Stop reason: HOSPADM

## 2018-08-22 RX ORDER — FAMOTIDINE 20 MG/1
20 TABLET, FILM COATED ORAL 2 TIMES DAILY
COMMUNITY
End: 2018-09-05

## 2018-08-22 RX ORDER — FOLIC ACID 1 MG/1
1 TABLET ORAL DAILY
Status: DISCONTINUED | OUTPATIENT
Start: 2018-08-22 | End: 2018-08-24 | Stop reason: HOSPADM

## 2018-08-22 RX ORDER — LAMOTRIGINE 100 MG/1
100 TABLET ORAL DAILY
Status: DISCONTINUED | OUTPATIENT
Start: 2018-09-01 | End: 2018-08-24 | Stop reason: HOSPADM

## 2018-08-22 RX ORDER — FUROSEMIDE 40 MG/1
40 TABLET ORAL 2 TIMES DAILY
Status: DISCONTINUED | OUTPATIENT
Start: 2018-08-22 | End: 2018-08-23

## 2018-08-22 RX ADMIN — LACTULOSE 10 G: 20 SOLUTION ORAL at 20:45

## 2018-08-22 RX ADMIN — POTASSIUM CHLORIDE 10 MEQ: 750 TABLET, EXTENDED RELEASE ORAL at 12:52

## 2018-08-22 RX ADMIN — INSULIN LISPRO 4 UNITS: 100 INJECTION, SOLUTION INTRAVENOUS; SUBCUTANEOUS at 17:35

## 2018-08-22 RX ADMIN — B-COMPLEX W/ C & FOLIC ACID TAB 1 TABLET: TAB at 12:55

## 2018-08-22 RX ADMIN — ALBUTEROL SULFATE 2.5 MG: 2.5 SOLUTION RESPIRATORY (INHALATION) at 19:49

## 2018-08-22 RX ADMIN — Medication 400 MG: at 20:45

## 2018-08-22 RX ADMIN — ACETAMINOPHEN 650 MG: 325 TABLET, FILM COATED ORAL at 21:20

## 2018-08-22 RX ADMIN — FUROSEMIDE 40 MG: 40 TABLET ORAL at 09:01

## 2018-08-22 RX ADMIN — ACETAMINOPHEN 1000 MG: 10 INJECTION, SOLUTION INTRAVENOUS at 00:07

## 2018-08-22 RX ADMIN — METOPROLOL TARTRATE 12.5 MG: 25 TABLET ORAL at 12:52

## 2018-08-22 RX ADMIN — LAMOTRIGINE 75 MG: 25 TABLET ORAL at 12:51

## 2018-08-22 RX ADMIN — INSULIN GLARGINE 10 UNITS: 100 INJECTION, SOLUTION SUBCUTANEOUS at 20:46

## 2018-08-22 RX ADMIN — POLYETHYLENE GLYCOL 3350 17 G: 17 POWDER, FOR SOLUTION ORAL at 09:02

## 2018-08-22 RX ADMIN — ACETAMINOPHEN 1000 MG: 10 INJECTION, SOLUTION INTRAVENOUS at 17:35

## 2018-08-22 RX ADMIN — MUPIROCIN: 20 OINTMENT TOPICAL at 20:46

## 2018-08-22 RX ADMIN — FOLIC ACID 1 MG: 1 TABLET ORAL at 12:52

## 2018-08-22 RX ADMIN — INSULIN LISPRO 2 UNITS: 100 INJECTION, SOLUTION INTRAVENOUS; SUBCUTANEOUS at 20:46

## 2018-08-22 RX ADMIN — Medication 15 ML: at 09:02

## 2018-08-22 RX ADMIN — Medication 400 MG: at 09:01

## 2018-08-22 RX ADMIN — CLOPIDOGREL BISULFATE 75 MG: 75 TABLET ORAL at 09:01

## 2018-08-22 RX ADMIN — MUPIROCIN: 20 OINTMENT TOPICAL at 09:02

## 2018-08-22 RX ADMIN — SODIUM CHLORIDE, PRESERVATIVE FREE 10 ML: 5 INJECTION INTRAVENOUS at 09:02

## 2018-08-22 RX ADMIN — INSULIN LISPRO 4 UNITS: 100 INJECTION, SOLUTION INTRAVENOUS; SUBCUTANEOUS at 12:55

## 2018-08-22 RX ADMIN — FAMOTIDINE 20 MG: 20 TABLET ORAL at 20:45

## 2018-08-22 RX ADMIN — ACETAMINOPHEN 1000 MG: 10 INJECTION, SOLUTION INTRAVENOUS at 08:59

## 2018-08-22 RX ADMIN — SODIUM CHLORIDE, PRESERVATIVE FREE 10 ML: 5 INJECTION INTRAVENOUS at 20:45

## 2018-08-22 RX ADMIN — POTASSIUM CHLORIDE 10 MEQ: 750 TABLET, EXTENDED RELEASE ORAL at 17:34

## 2018-08-22 RX ADMIN — Medication 2 G: at 00:07

## 2018-08-22 RX ADMIN — Medication 15 ML: at 20:46

## 2018-08-22 RX ADMIN — LEVOTHYROXINE SODIUM 150 MCG: 150 TABLET ORAL at 20:45

## 2018-08-22 RX ADMIN — ALBUTEROL SULFATE 2.5 MG: 2.5 SOLUTION RESPIRATORY (INHALATION) at 08:27

## 2018-08-22 RX ADMIN — POTASSIUM CHLORIDE 10 MEQ: 750 TABLET, EXTENDED RELEASE ORAL at 08:59

## 2018-08-22 RX ADMIN — FAMOTIDINE 20 MG: 20 TABLET ORAL at 09:00

## 2018-08-22 RX ADMIN — DOCUSATE SODIUM 100 MG: 100 CAPSULE, LIQUID FILLED ORAL at 08:59

## 2018-08-22 RX ADMIN — ALBUTEROL SULFATE 2.5 MG: 2.5 SOLUTION RESPIRATORY (INHALATION) at 16:16

## 2018-08-22 RX ADMIN — METOPROLOL TARTRATE 12.5 MG: 25 TABLET ORAL at 09:00

## 2018-08-22 RX ADMIN — ATORVASTATIN CALCIUM 20 MG: 10 TABLET, FILM COATED ORAL at 20:45

## 2018-08-22 RX ADMIN — ASPIRIN 81 MG: 81 TABLET, COATED ORAL at 08:59

## 2018-08-22 RX ADMIN — FUROSEMIDE 40 MG: 40 TABLET ORAL at 18:36

## 2018-08-22 RX ADMIN — DOCUSATE SODIUM 100 MG: 100 CAPSULE, LIQUID FILLED ORAL at 20:45

## 2018-08-22 RX ADMIN — ALBUTEROL SULFATE 2.5 MG: 2.5 SOLUTION RESPIRATORY (INHALATION) at 12:30

## 2018-08-22 RX ADMIN — METOPROLOL TARTRATE 25 MG: 25 TABLET, FILM COATED ORAL at 20:45

## 2018-08-22 ASSESSMENT — PAIN SCALES - GENERAL: PAINLEVEL_OUTOF10: 3

## 2018-08-22 NOTE — PROGRESS NOTES
Per nurse educator, tubes and wires are to be pulled no later than 1700. Mediastinal tube left in place to accommodate MD request that tube stay in until V wires are pulled and not to be pulled until after four hours. V wires were pulled at [phone], earliest available time to pulled mediastinal tube would be 1845. Will pull tube in AM to minimize patient risk and to ensure CT surgery will be in house in case of emergency after pull.

## 2018-08-22 NOTE — PROGRESS NOTES
Pt walked residential around unit on 2L O2 SPO2 96-98% tolerated well. PT back in bed SPO2 decreased to 55%, encouraged to cough and deep breath, pt unable to comply. Placed on 100% nonrebreather. STAT CXR ordered, Denise Sullivanon NP notified. Pt asleep SPO2 increased to 100%.  Decreased O2 to 5L SPO2 94%

## 2018-08-22 NOTE — PROGRESS NOTES
Chest tubes meet criteria to remove per open heart protocol. No  air leak. no crepitus. Pt instructed on procedure. Site cleansed and prepped per protocol. Chest tubes X 2 removed without difficulty. Dry sterile dressing applied. Bilateral breath sounds audible. O2 Sats 94 on 5 nasal cannula. Incision site within normal limits. Patient tolerated well.

## 2018-08-22 NOTE — PROGRESS NOTES
Upon getting from bed to the chair and while bathing, pt has had a decrease in oxygen saturation. Pt allowed to try and regain saturations previously. Slow to regain, placed on 2 liters oxygen while recouping from move.

## 2018-08-22 NOTE — PROGRESS NOTES
Received bedside report from off going RN. Pts skin was assessed. Turned and repositioned. Chest tubes x2 place. Orders verified. Pt A&O able to follow commands. Call light in reach, bed in lowest position, will continue to monitor.

## 2018-08-22 NOTE — PROGRESS NOTES
(acute, post-op)   ? Acute kidney injury (Cr > 0.5 over baseline, oliguria, ? 5ml/kg/hr x 6 hrs)  ? Acute renal failure (Cr > 2x baseline, Dialysis started)  ? Malnutrition: ? Severe (prealbumin <10, albumin ? 2.5)      ? Moderate (prealbumin <15, albumin <3)  ? SIRS due to non-infectious process (temp > 100.5, wbc > 12, HR > 90, RR>20)  ? Coagulopathy (consumptive, HIT, fibrinolysis) ?  FFP or platelets given post-op  ________________________________________________________________________    Carmen Vera PhD, CNP  8/22/2018  10:31 AM    Patient was seen examined chart was reviewed image was reviewed personally  Agree with the previous Haroon Mulligan MD [facility]

## 2018-08-23 LAB
ANION GAP SERPL CALCULATED.3IONS-SCNC: 9 MMOL/L (ref 3–16)
BUN BLDV-MCNC: 16 MG/DL (ref 7–20)
CALCIUM SERPL-MCNC: 8.7 MG/DL (ref 8.3–10.6)
CHLORIDE BLD-SCNC: 106 MMOL/L (ref 99–110)
CO2: 25 MMOL/L (ref 21–32)
CREAT SERPL-MCNC: 0.7 MG/DL (ref 0.9–1.3)
GFR AFRICAN AMERICAN: >60
GFR NON-AFRICAN AMERICAN: >60
GLUCOSE BLD-MCNC: 141 MG/DL (ref 70–99)
GLUCOSE BLD-MCNC: 144 MG/DL (ref 70–99)
GLUCOSE BLD-MCNC: 164 MG/DL (ref 70–99)
GLUCOSE BLD-MCNC: 175 MG/DL (ref 70–99)
GLUCOSE BLD-MCNC: 267 MG/DL (ref 70–99)
HCT VFR BLD CALC: 24.1 % (ref 40.5–52.5)
HEMOGLOBIN: 8.3 G/DL (ref 13.5–17.5)
MAGNESIUM: 2.1 MG/DL (ref 1.8–2.4)
MCH RBC QN AUTO: 29.7 PG (ref 26–34)
MCHC RBC AUTO-ENTMCNC: 34.6 G/DL (ref 31–36)
MCV RBC AUTO: 85.8 FL (ref 80–100)
PDW BLD-RTO: 14.6 % (ref 12.4–15.4)
PERFORMED ON: ABNORMAL
PLATELET # BLD: 168 K/UL (ref 135–450)
PMV BLD AUTO: 8.3 FL (ref 5–10.5)
POTASSIUM SERPL-SCNC: 3.9 MMOL/L (ref 3.5–5.1)
RBC # BLD: 2.81 M/UL (ref 4.2–5.9)
SODIUM BLD-SCNC: 140 MMOL/L (ref 136–145)
WBC # BLD: 8.7 K/UL (ref 4–11)

## 2018-08-23 PROCEDURE — 6370000000 HC RX 637 (ALT 250 FOR IP): Performed by: THORACIC SURGERY (CARDIOTHORACIC VASCULAR SURGERY)

## 2018-08-23 PROCEDURE — 94664 DEMO&/EVAL PT USE INHALER: CPT

## 2018-08-23 PROCEDURE — 6360000002 HC RX W HCPCS: Performed by: THORACIC SURGERY (CARDIOTHORACIC VASCULAR SURGERY)

## 2018-08-23 PROCEDURE — 2140000000 HC CCU INTERMEDIATE R&B

## 2018-08-23 PROCEDURE — 2580000003 HC RX 258: Performed by: THORACIC SURGERY (CARDIOTHORACIC VASCULAR SURGERY)

## 2018-08-23 PROCEDURE — 94640 AIRWAY INHALATION TREATMENT: CPT

## 2018-08-23 PROCEDURE — 94668 MNPJ CHEST WALL SBSQ: CPT

## 2018-08-23 PROCEDURE — 80048 BASIC METABOLIC PNL TOTAL CA: CPT

## 2018-08-23 PROCEDURE — 85027 COMPLETE CBC AUTOMATED: CPT

## 2018-08-23 PROCEDURE — 6360000002 HC RX W HCPCS: Performed by: NURSE PRACTITIONER

## 2018-08-23 PROCEDURE — 6370000000 HC RX 637 (ALT 250 FOR IP): Performed by: NURSE PRACTITIONER

## 2018-08-23 PROCEDURE — 83735 ASSAY OF MAGNESIUM: CPT

## 2018-08-23 PROCEDURE — 94761 N-INVAS EAR/PLS OXIMETRY MLT: CPT

## 2018-08-23 RX ORDER — FUROSEMIDE 40 MG/1
40 TABLET ORAL DAILY
Status: DISCONTINUED | OUTPATIENT
Start: 2018-08-23 | End: 2018-08-24 | Stop reason: HOSPADM

## 2018-08-23 RX ORDER — INSULIN GLARGINE 100 [IU]/ML
14 INJECTION, SOLUTION SUBCUTANEOUS NIGHTLY
Status: DISCONTINUED | OUTPATIENT
Start: 2018-08-23 | End: 2018-08-24 | Stop reason: HOSPADM

## 2018-08-23 RX ORDER — BISACODYL 10 MG
20 SUPPOSITORY, RECTAL RECTAL ONCE
Status: DISCONTINUED | OUTPATIENT
Start: 2018-08-23 | End: 2018-08-23

## 2018-08-23 RX ORDER — METOPROLOL TARTRATE 50 MG/1
50 TABLET, FILM COATED ORAL 2 TIMES DAILY
Status: DISCONTINUED | OUTPATIENT
Start: 2018-08-23 | End: 2018-08-24 | Stop reason: HOSPADM

## 2018-08-23 RX ORDER — FONDAPARINUX SODIUM 2.5 MG/.5ML
2.5 INJECTION SUBCUTANEOUS DAILY
Status: DISCONTINUED | OUTPATIENT
Start: 2018-08-23 | End: 2018-08-24 | Stop reason: HOSPADM

## 2018-08-23 RX ADMIN — LAMOTRIGINE 75 MG: 25 TABLET ORAL at 08:36

## 2018-08-23 RX ADMIN — POTASSIUM CHLORIDE 10 MEQ: 750 TABLET, EXTENDED RELEASE ORAL at 18:16

## 2018-08-23 RX ADMIN — FONDAPARINUX SODIUM 2.5 MG: 2.5 INJECTION, SOLUTION SUBCUTANEOUS at 08:37

## 2018-08-23 RX ADMIN — Medication 15 ML: at 09:30

## 2018-08-23 RX ADMIN — ALBUTEROL SULFATE 2.5 MG: 2.5 SOLUTION RESPIRATORY (INHALATION) at 08:24

## 2018-08-23 RX ADMIN — INSULIN LISPRO 4 UNITS: 100 INJECTION, SOLUTION INTRAVENOUS; SUBCUTANEOUS at 08:44

## 2018-08-23 RX ADMIN — Medication 400 MG: at 08:37

## 2018-08-23 RX ADMIN — SODIUM CHLORIDE, PRESERVATIVE FREE 10 ML: 5 INJECTION INTRAVENOUS at 20:10

## 2018-08-23 RX ADMIN — ACETAMINOPHEN 1000 MG: 10 INJECTION, SOLUTION INTRAVENOUS at 01:31

## 2018-08-23 RX ADMIN — FAMOTIDINE 20 MG: 20 TABLET ORAL at 08:36

## 2018-08-23 RX ADMIN — SODIUM CHLORIDE, PRESERVATIVE FREE 10 ML: 5 INJECTION INTRAVENOUS at 08:45

## 2018-08-23 RX ADMIN — CLOPIDOGREL BISULFATE 75 MG: 75 TABLET ORAL at 08:36

## 2018-08-23 RX ADMIN — METOPROLOL TARTRATE 25 MG: 25 TABLET ORAL at 12:36

## 2018-08-23 RX ADMIN — FOLIC ACID 1 MG: 1 TABLET ORAL at 08:36

## 2018-08-23 RX ADMIN — MAGNESIUM HYDROXIDE 30 ML: 400 SUSPENSION ORAL at 08:37

## 2018-08-23 RX ADMIN — METOPROLOL TARTRATE 25 MG: 25 TABLET, FILM COATED ORAL at 09:29

## 2018-08-23 RX ADMIN — MUPIROCIN: 20 OINTMENT TOPICAL at 20:09

## 2018-08-23 RX ADMIN — ASPIRIN 81 MG: 81 TABLET, COATED ORAL at 08:36

## 2018-08-23 RX ADMIN — Medication 400 MG: at 20:09

## 2018-08-23 RX ADMIN — INSULIN LISPRO 4 UNITS: 100 INJECTION, SOLUTION INTRAVENOUS; SUBCUTANEOUS at 18:17

## 2018-08-23 RX ADMIN — INSULIN LISPRO 3 UNITS: 100 INJECTION, SOLUTION INTRAVENOUS; SUBCUTANEOUS at 18:17

## 2018-08-23 RX ADMIN — ATORVASTATIN CALCIUM 20 MG: 10 TABLET, FILM COATED ORAL at 20:09

## 2018-08-23 RX ADMIN — ALBUTEROL SULFATE 2.5 MG: 2.5 SOLUTION RESPIRATORY (INHALATION) at 12:00

## 2018-08-23 RX ADMIN — FAMOTIDINE 20 MG: 20 TABLET ORAL at 20:09

## 2018-08-23 RX ADMIN — LEVOTHYROXINE SODIUM 150 MCG: 150 TABLET ORAL at 18:16

## 2018-08-23 RX ADMIN — INSULIN GLARGINE 14 UNITS: 100 INJECTION, SOLUTION SUBCUTANEOUS at 20:18

## 2018-08-23 RX ADMIN — DOCUSATE SODIUM 100 MG: 100 CAPSULE, LIQUID FILLED ORAL at 08:36

## 2018-08-23 RX ADMIN — LACTULOSE 10 G: 20 SOLUTION ORAL at 08:37

## 2018-08-23 RX ADMIN — ALBUTEROL SULFATE 2.5 MG: 2.5 SOLUTION RESPIRATORY (INHALATION) at 16:04

## 2018-08-23 RX ADMIN — POTASSIUM CHLORIDE 10 MEQ: 750 TABLET, EXTENDED RELEASE ORAL at 08:37

## 2018-08-23 RX ADMIN — POTASSIUM CHLORIDE 20 MEQ: 400 INJECTION, SOLUTION INTRAVENOUS at 11:06

## 2018-08-23 RX ADMIN — Medication 15 ML: at 20:09

## 2018-08-23 RX ADMIN — METOPROLOL TARTRATE 50 MG: 50 TABLET ORAL at 20:09

## 2018-08-23 RX ADMIN — INSULIN LISPRO 2 UNITS: 100 INJECTION, SOLUTION INTRAVENOUS; SUBCUTANEOUS at 20:17

## 2018-08-23 RX ADMIN — INSULIN LISPRO 4 UNITS: 100 INJECTION, SOLUTION INTRAVENOUS; SUBCUTANEOUS at 12:31

## 2018-08-23 RX ADMIN — MUPIROCIN: 20 OINTMENT TOPICAL at 09:30

## 2018-08-23 RX ADMIN — INSULIN LISPRO 9 UNITS: 100 INJECTION, SOLUTION INTRAVENOUS; SUBCUTANEOUS at 08:44

## 2018-08-23 RX ADMIN — B-COMPLEX W/ C & FOLIC ACID TAB 1 TABLET: TAB at 08:36

## 2018-08-23 RX ADMIN — FUROSEMIDE 40 MG: 40 TABLET ORAL at 08:36

## 2018-08-23 RX ADMIN — POTASSIUM CHLORIDE 20 MEQ: 400 INJECTION, SOLUTION INTRAVENOUS at 09:30

## 2018-08-23 RX ADMIN — INSULIN LISPRO 3 UNITS: 100 INJECTION, SOLUTION INTRAVENOUS; SUBCUTANEOUS at 12:30

## 2018-08-23 RX ADMIN — ALBUTEROL SULFATE 2.5 MG: 2.5 SOLUTION RESPIRATORY (INHALATION) at 20:49

## 2018-08-23 ASSESSMENT — PAIN SCALES - GENERAL
PAINLEVEL_OUTOF10: 0

## 2018-08-23 NOTE — PROGRESS NOTES
4 Eyes Skin Assessment     The patient is being assess for   Shift Handoff    I agree that 2 RN's have performed a thorough Head to Toe Skin Assessment on the patient. ALL assessment sites listed below have been assessed. Areas assessed by both nurses:   [x]   Head, Face, and Ears   [x]   Shoulders, Back, and Chest, Abdomen  [x]   Arms, Elbows, and Hands   [x]   Coccyx, Sacrum, and Ischium  [x]   Legs, Feet, and Heels            **SHARE this note so that the co-signing nurse is able to place an eSignature**    Co-signer eSignature: Electronically signed by Starr Bay RN on 8/23/18 at 10:28 AM    Does the Patient have Skin Breakdown?   No          Marty Prevention initiated:  Yes   Wound Care Orders initiated:  No      [facility] nurse consulted for Pressure Injury (Stage 3,4, Unstageable, DTI, NWPT, Complex wounds)and New or Established Ostomies:  NA      Primary Nurse eSignature: Electronically signed by Kailey Watts RN on 8/23/18 at 8:00 AM

## 2018-08-23 NOTE — PROGRESS NOTES
08/23/18 1607   Oxygen Therapy/Pulse Ox   O2 Device None (Room air)   SpO2 95 %   Treatment   Treatment Type HHN;Vibratory Mucous Clearing Therapy or Intervention   Medications Albuterol   Duration 10   Pre-Tx Pulse 100   Pre-Tx Resps 16   Breath Sounds Pre-Tx SCOT Clear   Breath Sounds Pre-Tx LLL Diminished   Breath Sounds Pre-Tx RUL Clear   Breath Sounds Pre-Tx RML Diminished   Breath Sounds Pre-Tx RLL Diminished   Breath Sounds Post-Tx SCOT Clear   Breath Sounds Post-Tx LLL Diminished   Breath Sounds Post-Tx RUL Clear   Breath Sounds Post-Tx RML Diminished   Breath Sounds Post-Tx RLL Diminished   Post-Tx Pulse 100   Post-Tx Resps 16   Delivery Source Air   Position Sitting   Tx Tolerance Well   Cough/Sputum   Sputum How Obtained Spontaneous cough   Cough Non-productive   Patient Observation   Observations aerobika x10 post tx

## 2018-08-23 NOTE — PLAN OF CARE
Problem: Activity Intolerance:  Goal: Able to perform prescribed physical activity  Able to perform prescribed physical activity   Outcome: Ongoing  Patient successfully ambulated and followed sternal precautions with RN assistance multiple times to the bathroom, within the room, and in the hallway this shift. Patient tolerated very well, complaining of no dizziness or shortness of breath.

## 2018-08-23 NOTE — PROGRESS NOTES
CVTS Cardiothoracic Progress Note:                                CC:  Post op follow up     Surgery: 8/20/18 CORONARY ARTERY BYPASS GRAFTING X3, INTERNAL MAMMARY ARTERY to LAD, [place] Course:  8/21 Doing well, extubated, protamine given overnight. Labs stable this am.   8/22 Doing well. Epicardial wires pulled later yesterday. Anxiety with walking that developed into hyperventilating.   8/23 doing well. Chest tubes out yesterday.        Allergies as of 08/16/2018 - Review Complete 08/16/2018   Allergen Reaction Noted    Dye [iodides]  04/23/2011      Patient Active Problem List   Diagnosis    Vomiting    Diabetes mellitus ([facility] [facility]    Bipolar affective ([facility] [address] [facility]    Suicidal intent    Chest pain    Abnormal nuclear cardiac imaging test    Essential hypertension    Angina, class IV (HCC)    Abnormal stress test    Chronic total occlusion of native coronary artery    Coronary artery disease of native artery of native heart with stable angina pectoris (HCC)    Mixed hyperlipidemia    ROGERS (dyspnea on exertion)    Severe nausea and vomiting    Acute renal failure (HCC)    Accidental lithium poisoning    Moderate protein-calorie malnutrition (HCC)    LAYO (obstructive sleep apnea)    Moderate malnutrition (HCC)    CAD in native artery      Vital Signs: /76   Pulse 98   Temp 99.3 °F (37.4 °C) (Oral)   Resp 14   Ht 6' (1.829 m)   Wt 201 lb 8 oz (91.4 kg)   SpO2 96%   BMI 27.33 kg/m²  O2 Flow Rate (L/min): 0 L/min     Admission Weight: Weight: 199 lb (90.3 kg)    Weight on 8/20 90.4 kg Pre-op   Weight on 8/21 94.3 kg  8/22 90.0 kg  8/23 91.4 kg    Intake/Output:   Intake/Output Summary (Last 24 hours) at 08/23/18 0957  Last data filed at 08/23/18 0639   Gross per 24 hour   Intake              240 ml   Output             1225 ml   Net             -985 ml      Extubation Time: 8/20/18 1038  Transition Time: 8/21/180540    LABORATORY DATA:     CBC:   Recent Labs 08/21/18   0418  08/22/18   0545  08/23/18   0530   WBC  9.0  9.9  8.7   HGB  9.0*  9.3*  8.3*   HCT  26.0*  27.0*  24.1*   MCV  85.7  86.2  85.8   PLT  153  162  168     BMP:   Recent Labs      08/21/18   0418  08/22/18   0545  08/23/18   0530   NA  138  140  140   K  4.1  3.9  3.9   CL  106  107  106   CO2  22  26  25   BUN  8  12  16   CREATININE  0.7*  0.8*  0.7*     MG:    Recent Labs      08/21/18   0418  08/22/18   0545  08/23/18   0530   MG  1.80  2.00  2.10      PT/INR:   Recent Labs      08/20/18   2254   PROTIME  13.3*   INR  1.17*     APTT:   Recent Labs      08/20/18   2254   APTT  32.2     CXR: 8/20/18 1059 stable postop changes  8/20/18 2341 Left pleural effusion   ________________________________________________________________________    Subjective:   Dietary Intake: appetite fair  no Nausea   Pain Control: uncontrolled  Complaints: pain  Bowels: have not moved    Objective:   General appearance: Up in chair, flat affect, monotone voice, in no distress  Lungs: few scattered crackles posterior. RA  Heart: S1S2 RRR  SR 82 on monitor  Chest: symmetrical expansion with inspiration and expirations; no rocking of sternum noted   Abdomen: soft, nontender  Bowel sounds: active   Kidneys: UOP -/800/425, crea 0.7  Wound/Incisions: Midsternal incision CDI; Right LE incisionsCDI; Pacing wires out  Extremities: BLE pulses palpable; 1+ tibial swelling noted  Neurological: sleepy but wakens easily. Voice strong, non-focal   Chest tubes/Drains:out ________________________________________________________________________    SCIP Measures:     · Vela catheter for:  ? IV Diuresis  ? Accurate I/O  ? D/C today  ·   · Antibiotics:  ? Have been stopped  ? Prophylaxis (POD #1 only)  ? Continued for:   ________________________________________________________________________    Assessment:   Post-op: 3 days. Condition: In stable condition. Plan:   1.  Cardiovascular: s/p CABG - ASA, increase BB again this am, Statin, Plavix. Unable to start ACEI this am 2/2 BP soft. 2. Pulmonary: continue aggressive pulm expansion maneuvers, oobtc. 3. Neurology: Analgesia as tolerates. Bipolar - resume some home meds    4. Nephrology: fluid management - decrease diurese    5. Endocrinology: T2DM - controlled with a1c 5.1. Transitioned to ISS. Start home synthroid    6. Hematology: Acute blood loss anemia stable. Monitor H/H   Add SCDs. today - DVT prophy today. 7. Microbiology: nothing for now    7. Nutrition: ADAT    8. Labs: monitor    9. Post-op Drains/Wires: all out    10. D/C Goals: Hopefully home with family tomorrow    6. Continue post-op care of patient in the ICU    Meds:    The patient is on a beta-blocker   The patient is on an ace-i/ARB - increasing BB and unable to increase ACEI also -    The patient is on a statin   The patient is on oral antiplatelet therapy   ________________________________________________________________________  *Only bolded items apply to this patient at this time:     ? Acidemia (pH < 7.35) ______  ? Alkalemia (pH > 7.45) ______  ? Acidosis (Bicarb <20) ______  ? Electrolyte abnormality (specify)  ? Acute post-op respiratory insufficiency (difficulty weaning from vent)  ? Acute post-op blood loss anemia (hct ? 30)        ? Transfused this admission post-op  ? Cardiac arrythmia:  ? Ventricular Tachycardia     ? Atrial Flutter     ? Atrial fibrillation  ? Acute pulmonary edema due to:      ? Noncardiogenic causes  ? Acute heart failure (Syst, Diast)   ? Encephalopathy (confusion, delirium, altered mental status), (toxic, metabolic)  ? Restraints in use  ? TIA/Stroke (acute, post-op)   ? Acute kidney injury (Cr > 0.5 over baseline, oliguria, ? 5ml/kg/hr x 6 hrs)  ? Acute renal failure (Cr > 2x baseline, Dialysis started)  ? Malnutrition: ? Severe (prealbumin <10, albumin ? 2.5)      ? Moderate (prealbumin <15, albumin <3)  ?  SIRS due to non-infectious process (temp > 100.5, wbc > 12, HR > 90, RR>20)  ? Coagulopathy (consumptive, HIT, fibrinolysis) ?  FFP or platelets given post-op  ________________________________________________________________________    Kristi Cerrato PhD, CNP  8/23/2018  9:57 AM       Patient was seen examined chart was reviewed image was reviewed personally  Agree with the previous Janessa Velazquez MD [facility]

## 2018-08-23 NOTE — PLAN OF CARE
[address] REHAB PHASE I  POST OP OHS Education      Open heart surgery post-operative education and information was provided to the patient and/or family. Discharge instructions following open heart surgery:    Explained supplies needed at home, activity instructions, incision care, diet/nutritional intake, medications, daily weights and temperature, smoking cessation, wearing JETT hose, using incentive spirometer, awareness of heart beating, and when to call the doctor. Reminded patient of follow up with doctor   Explained the activity log and walking protocol   Reviewed lifestyle changes        Outpatient cardiac rehab referral was completed and patient was educated on benefits of participation in outpatient cardiac rehabilitation (OPCR) and secondary prevention. Patient was informed of choices for local programs close to their home and were provided with contact information for these programs. . List located in \"Your care after heart surgery\" binder. Cardiac Rehab referral completed for Post Open Heart Surgery and provided with a brochure explaining Cardiac Rehab exercise and education program that begins 4-6 weeks post surgery, when released by surgeon. Contact numbers present on brochure. Re-enforced walking program, use of incentive spirometer, and deep breathing initiated by nursing staff in hospital to continue in home program. Thank you for this referral.     Pt and pt's girlfriend demonstrate understanding. No additional questions at this time. Pt would like to attend OPCR at [facility].     CELESTE Tuttle 08/23/18 2:00 PM    Exercise Specialist    Phone: [phone]

## 2018-08-24 VITALS
WEIGHT: 198.41 LBS | HEART RATE: 76 BPM | HEIGHT: 72 IN | BODY MASS INDEX: 26.87 KG/M2 | RESPIRATION RATE: 16 BRPM | SYSTOLIC BLOOD PRESSURE: 118 MMHG | OXYGEN SATURATION: 95 % | DIASTOLIC BLOOD PRESSURE: 96 MMHG | TEMPERATURE: 97.4 F

## 2018-08-24 LAB
ACTIVATED CLOTTING TIME: 107 SEC (ref 99–130)
ACTIVATED CLOTTING TIME: 476 SEC (ref 99–130)
ACTIVATED CLOTTING TIME: 572 SEC (ref 99–130)
ACTIVATED CLOTTING TIME: 94 SEC (ref 99–130)
ANION GAP SERPL CALCULATED.3IONS-SCNC: 11 MMOL/L (ref 3–16)
BUN BLDV-MCNC: 13 MG/DL (ref 7–20)
CALCIUM SERPL-MCNC: 9.1 MG/DL (ref 8.3–10.6)
CHLORIDE BLD-SCNC: 106 MMOL/L (ref 99–110)
CO2: 24 MMOL/L (ref 21–32)
CREAT SERPL-MCNC: 0.7 MG/DL (ref 0.9–1.3)
GFR AFRICAN AMERICAN: >60
GFR NON-AFRICAN AMERICAN: >60
GLUCOSE BLD-MCNC: 129 MG/DL (ref 70–99)
GLUCOSE BLD-MCNC: 157 MG/DL (ref 70–99)
GLUCOSE BLD-MCNC: 210 MG/DL (ref 70–99)
HCT VFR BLD CALC: 24.9 % (ref 40.5–52.5)
HEMOGLOBIN: 8.5 G/DL (ref 13.5–17.5)
MAGNESIUM: 2.1 MG/DL (ref 1.8–2.4)
MCH RBC QN AUTO: 29.1 PG (ref 26–34)
MCHC RBC AUTO-ENTMCNC: 34.1 G/DL (ref 31–36)
MCV RBC AUTO: 85.4 FL (ref 80–100)
PDW BLD-RTO: 14.7 % (ref 12.4–15.4)
PERFORMED ON: ABNORMAL
PERFORMED ON: ABNORMAL
PLATELET # BLD: 227 K/UL (ref 135–450)
PMV BLD AUTO: 7.5 FL (ref 5–10.5)
POTASSIUM SERPL-SCNC: 4.5 MMOL/L (ref 3.5–5.1)
RBC # BLD: 2.91 M/UL (ref 4.2–5.9)
SODIUM BLD-SCNC: 141 MMOL/L (ref 136–145)
WBC # BLD: 8.9 K/UL (ref 4–11)

## 2018-08-24 PROCEDURE — 6370000000 HC RX 637 (ALT 250 FOR IP): Performed by: NURSE PRACTITIONER

## 2018-08-24 PROCEDURE — 36592 COLLECT BLOOD FROM PICC: CPT

## 2018-08-24 PROCEDURE — G8988 SELF CARE GOAL STATUS: HCPCS

## 2018-08-24 PROCEDURE — 83735 ASSAY OF MAGNESIUM: CPT

## 2018-08-24 PROCEDURE — G8989 SELF CARE D/C STATUS: HCPCS

## 2018-08-24 PROCEDURE — 94668 MNPJ CHEST WALL SBSQ: CPT

## 2018-08-24 PROCEDURE — 97530 THERAPEUTIC ACTIVITIES: CPT

## 2018-08-24 PROCEDURE — G8979 MOBILITY GOAL STATUS: HCPCS

## 2018-08-24 PROCEDURE — 6360000002 HC RX W HCPCS: Performed by: THORACIC SURGERY (CARDIOTHORACIC VASCULAR SURGERY)

## 2018-08-24 PROCEDURE — 97161 PT EVAL LOW COMPLEX 20 MIN: CPT

## 2018-08-24 PROCEDURE — G8980 MOBILITY D/C STATUS: HCPCS

## 2018-08-24 PROCEDURE — 85027 COMPLETE CBC AUTOMATED: CPT

## 2018-08-24 PROCEDURE — 80048 BASIC METABOLIC PNL TOTAL CA: CPT

## 2018-08-24 PROCEDURE — 2580000003 HC RX 258: Performed by: THORACIC SURGERY (CARDIOTHORACIC VASCULAR SURGERY)

## 2018-08-24 PROCEDURE — 6370000000 HC RX 637 (ALT 250 FOR IP): Performed by: THORACIC SURGERY (CARDIOTHORACIC VASCULAR SURGERY)

## 2018-08-24 PROCEDURE — 6360000002 HC RX W HCPCS: Performed by: NURSE PRACTITIONER

## 2018-08-24 PROCEDURE — G8978 MOBILITY CURRENT STATUS: HCPCS

## 2018-08-24 PROCEDURE — 97166 OT EVAL MOD COMPLEX 45 MIN: CPT

## 2018-08-24 PROCEDURE — G8987 SELF CARE CURRENT STATUS: HCPCS

## 2018-08-24 PROCEDURE — 97116 GAIT TRAINING THERAPY: CPT

## 2018-08-24 PROCEDURE — 94640 AIRWAY INHALATION TREATMENT: CPT

## 2018-08-24 RX ORDER — LAMOTRIGINE 25 MG/1
TABLET ORAL
Qty: 15 TABLET | Refills: 0 | Status: SHIPPED | OUTPATIENT
Start: 2018-08-24

## 2018-08-24 RX ORDER — PSEUDOEPHEDRINE HCL 30 MG
100 TABLET ORAL 2 TIMES DAILY
Status: CANCELLED | COMMUNITY
Start: 2018-08-24

## 2018-08-24 RX ORDER — PSEUDOEPHEDRINE HCL 30 MG
100 TABLET ORAL 2 TIMES DAILY
Status: ON HOLD | COMMUNITY
Start: 2018-08-24 | End: 2018-10-08 | Stop reason: HOSPADM

## 2018-08-24 RX ORDER — ATORVASTATIN CALCIUM 20 MG/1
20 TABLET, FILM COATED ORAL NIGHTLY
Qty: 30 TABLET | Refills: 3 | Status: SHIPPED | OUTPATIENT
Start: 2018-08-24 | End: 2018-09-17 | Stop reason: SDUPTHER

## 2018-08-24 RX ORDER — OXYCODONE HYDROCHLORIDE AND ACETAMINOPHEN 5; 325 MG/1; MG/1
1 TABLET ORAL EVERY 6 HOURS PRN
Qty: 28 TABLET | Refills: 0 | Status: CANCELLED | OUTPATIENT
Start: 2018-08-24 | End: 2018-08-31

## 2018-08-24 RX ORDER — FUROSEMIDE 40 MG/1
40 TABLET ORAL DAILY
Qty: 7 TABLET | Refills: 0 | Status: CANCELLED | OUTPATIENT
Start: 2018-08-25 | End: 2018-09-01

## 2018-08-24 RX ORDER — ATORVASTATIN CALCIUM 20 MG/1
20 TABLET, FILM COATED ORAL NIGHTLY
Qty: 30 TABLET | Refills: 3 | Status: CANCELLED | OUTPATIENT
Start: 2018-08-24

## 2018-08-24 RX ORDER — POTASSIUM CHLORIDE 750 MG/1
10 TABLET, FILM COATED, EXTENDED RELEASE ORAL 2 TIMES DAILY
Qty: 14 TABLET | Refills: 0 | Status: CANCELLED | OUTPATIENT
Start: 2018-08-24 | End: 2018-08-31

## 2018-08-24 RX ORDER — OXYCODONE HYDROCHLORIDE AND ACETAMINOPHEN 5; 325 MG/1; MG/1
1 TABLET ORAL EVERY 6 HOURS PRN
Qty: 28 TABLET | Refills: 0 | Status: SHIPPED | OUTPATIENT
Start: 2018-08-24 | End: 2018-08-31

## 2018-08-24 RX ORDER — METOPROLOL TARTRATE 50 MG/1
50 TABLET, FILM COATED ORAL 2 TIMES DAILY
Qty: 60 TABLET | Refills: 3 | Status: ON HOLD | OUTPATIENT
Start: 2018-08-24 | End: 2018-10-11

## 2018-08-24 RX ORDER — POTASSIUM CHLORIDE 750 MG/1
10 TABLET, FILM COATED, EXTENDED RELEASE ORAL 2 TIMES DAILY
Qty: 14 TABLET | Refills: 0 | Status: SHIPPED | OUTPATIENT
Start: 2018-08-24 | End: 2018-09-05 | Stop reason: ALTCHOICE

## 2018-08-24 RX ORDER — FUROSEMIDE 40 MG/1
40 TABLET ORAL DAILY
Qty: 7 TABLET | Refills: 0 | Status: SHIPPED | OUTPATIENT
Start: 2018-08-25 | End: 2018-09-05 | Stop reason: ALTCHOICE

## 2018-08-24 RX ADMIN — FOLIC ACID 1 MG: 1 TABLET ORAL at 08:04

## 2018-08-24 RX ADMIN — SODIUM CHLORIDE, PRESERVATIVE FREE 10 ML: 5 INJECTION INTRAVENOUS at 08:05

## 2018-08-24 RX ADMIN — INSULIN LISPRO 6 UNITS: 100 INJECTION, SOLUTION INTRAVENOUS; SUBCUTANEOUS at 12:07

## 2018-08-24 RX ADMIN — INSULIN LISPRO 8 UNITS: 100 INJECTION, SOLUTION INTRAVENOUS; SUBCUTANEOUS at 12:08

## 2018-08-24 RX ADMIN — METFORMIN HYDROCHLORIDE 1000 MG: 500 TABLET, FILM COATED ORAL at 08:13

## 2018-08-24 RX ADMIN — INSULIN LISPRO 8 UNITS: 100 INJECTION, SOLUTION INTRAVENOUS; SUBCUTANEOUS at 08:14

## 2018-08-24 RX ADMIN — METOPROLOL TARTRATE 50 MG: 50 TABLET ORAL at 08:03

## 2018-08-24 RX ADMIN — INSULIN LISPRO 3 UNITS: 100 INJECTION, SOLUTION INTRAVENOUS; SUBCUTANEOUS at 08:01

## 2018-08-24 RX ADMIN — OXYCODONE HYDROCHLORIDE AND ACETAMINOPHEN 1 TABLET: 5; 325 TABLET ORAL at 07:01

## 2018-08-24 RX ADMIN — FAMOTIDINE 20 MG: 20 TABLET ORAL at 08:03

## 2018-08-24 RX ADMIN — POTASSIUM CHLORIDE 10 MEQ: 750 TABLET, EXTENDED RELEASE ORAL at 08:03

## 2018-08-24 RX ADMIN — Medication 400 MG: at 08:03

## 2018-08-24 RX ADMIN — DOCUSATE SODIUM 100 MG: 100 CAPSULE, LIQUID FILLED ORAL at 08:03

## 2018-08-24 RX ADMIN — B-COMPLEX W/ C & FOLIC ACID TAB 1 TABLET: TAB at 08:03

## 2018-08-24 RX ADMIN — ALBUTEROL SULFATE 2.5 MG: 2.5 SOLUTION RESPIRATORY (INHALATION) at 07:33

## 2018-08-24 RX ADMIN — CLOPIDOGREL BISULFATE 75 MG: 75 TABLET ORAL at 08:03

## 2018-08-24 RX ADMIN — Medication 15 ML: at 12:07

## 2018-08-24 RX ADMIN — ASPIRIN 81 MG: 81 TABLET, COATED ORAL at 08:03

## 2018-08-24 RX ADMIN — FONDAPARINUX SODIUM 2.5 MG: 2.5 INJECTION, SOLUTION SUBCUTANEOUS at 08:04

## 2018-08-24 RX ADMIN — LAMOTRIGINE 75 MG: 25 TABLET ORAL at 08:03

## 2018-08-24 RX ADMIN — FUROSEMIDE 40 MG: 40 TABLET ORAL at 08:03

## 2018-08-24 ASSESSMENT — PAIN SCALES - GENERAL
PAINLEVEL_OUTOF10: 7
PAINLEVEL_OUTOF10: 6

## 2018-08-24 ASSESSMENT — PAIN DESCRIPTION - PAIN TYPE: TYPE: SURGICAL PAIN

## 2018-08-24 ASSESSMENT — PAIN DESCRIPTION - LOCATION: LOCATION: CHEST

## 2018-08-24 NOTE — PROGRESS NOTES
08/23/18 2051   Oxygen Therapy/Pulse Ox   O2 Device None (Room air)   SpO2 98 %   Treatment   Treatment Type HHN;Vibratory Mucous Clearing Therapy or Intervention   Medications Albuterol   Duration 10   Pre-Tx Pulse 87   Pre-Tx Resps 16   Breath Sounds Pre-Tx SCOT Diminished   Breath Sounds Pre-Tx LLL Diminished   Breath Sounds Pre-Tx RUL Diminished   Breath Sounds Pre-Tx RML Diminished   Breath Sounds Pre-Tx RLL Diminished   Breath Sounds Post-Tx SCOT Diminished   Breath Sounds Post-Tx LLL Diminished   Breath Sounds Post-Tx RUL Diminished   Breath Sounds Post-Tx RML Diminished   Breath Sounds Post-Tx RLL Diminished   Post-Tx Pulse 80   Post-Tx Resps 16   Delivery Source Air   Position Semi-Ruvalcaba's   Tx Tolerance Well   Patient Observation   Observations aerobika x10 post tx

## 2018-08-24 NOTE — DISCHARGE SUMMARY
Factor Xa Inhibitors (Xarelto/Elliquis):not indicated  Novel Oral Anticoagulant:not indicated  ACE/ARB:contraindicated  - hypotension  Amiodarone:not indicated  Betablocker:decrease  Statin:decrease  Other Lipid Lowering:not indicated    Discharge Medications:   Ana Dejesus   Home Medication Instructions [id number]    Printed on:08/24/18 9066   Medication Information                      alogliptin-metformin (KAZANO) 12.5-1000 MG TABS  Take 1 capsule by mouth 2 times daily             aspirin 81 MG EC tablet  Take 1 tablet by mouth daily             atorvastatin (LIPITOR) 20 MG tablet  Take 1 tablet by mouth nightly             b complex vitamins capsule  Take 1 capsule by mouth 2 times daily             clopidogrel (PLAVIX) 75 MG tablet  Take 75 mg by mouth daily             docusate sodium (COLACE, DULCOLAX) 100 MG CAPS  Take 100 mg by mouth 2 times daily             famotidine (PEPCID) 20 MG tablet  Take 20 mg by mouth 2 times daily             folic acid (FOLVITE) 1 MG tablet  Take 1 mg by mouth daily             furosemide (LASIX) 40 MG tablet  Take 1 tablet by mouth daily for 7 days             insulin aspart (NOVOLOG) 100 UNIT/ML injection vial  Inject 8 Units into the skin 3 times daily (before meals) For high blood sugar as needed             insulin glargine (BASAGLAR KWIKPEN) 100 UNIT/ML injection pen  Inject 14 Units into the skin every morning              lamoTRIgine (LAMICTAL) 25 MG tablet  Take 3 tablets PO daily. Follow up with MD for further titrations. levothyroxine (SYNTHROID) 150 MCG tablet  Take 150 mcg by mouth nightly             magnesium oxide (MAG-OX) 400 (241.3 Mg) MG TABS tablet  Take 1 tablet by mouth daily for 7 days             metoprolol tartrate (LOPRESSOR) 50 MG tablet  Take 1 tablet by mouth 2 times daily             oxyCODONE-acetaminophen (PERCOCET) 5-325 MG per tablet  Take 1 tablet by mouth every 6 hours as needed for Pain for up to 7 days. Josse Castellanos

## 2018-08-24 NOTE — PROGRESS NOTES
maneuvers, oobtc. 3. Neurology: Analgesia as tolerates. Bipolar - resuming Lamictal    4. Nephrology: fluid management - diurese    5. Endocrinology: T2DM - controlled with a1c 5.1. Transitioned to ISS. Continue home regimen, metformin  Start home synthroid    6. Hematology: Acute blood loss anemia stable. SCDs and DVT prophy. 7. Microbiology: nothing for now    7. Nutrition: ADAT    8. Labs: monitor    9. Post-op Drains/Wires: all out    10. D/C Goals: Home today    11. Continue post-op care of patient in the ICU    Meds:    The patient is on a beta-blocker   The patient is on an ace-i/ARB - increasing BB and unable to increase ACEI also -    The patient is on a statin   The patient is on oral antiplatelet therapy   ________________________________________________________________________  *Only bolded items apply to this patient at this time:     ? Acidemia (pH < 7.35) ______  ? Alkalemia (pH > 7.45) ______  ? Acidosis (Bicarb <20) ______  ? Electrolyte abnormality (specify)  ? Acute post-op respiratory insufficiency (difficulty weaning from vent)  ? Acute post-op blood loss anemia (hct ? 30)        ? Transfused this admission post-op  ? Cardiac arrythmia:  ? Ventricular Tachycardia     ? Atrial Flutter     ? Atrial fibrillation  ? Acute pulmonary edema due to:      ? Noncardiogenic causes  ? Acute heart failure (Syst, Diast)   ? Encephalopathy (confusion, delirium, altered mental status), (toxic, metabolic)  ? Restraints in use  ? TIA/Stroke (acute, post-op)   ? Acute kidney injury (Cr > 0.5 over baseline, oliguria, ? 5ml/kg/hr x 6 hrs)  ? Acute renal failure (Cr > 2x baseline, Dialysis started)  ? Malnutrition: ? Severe (prealbumin <10, albumin ? 2.5)      ? Moderate (prealbumin <15, albumin <3)  ? SIRS due to non-infectious process (temp > 100.5, wbc > 12, HR > 90, RR>20)  ? Coagulopathy (consumptive, HIT, fibrinolysis) ?  FFP or platelets given post-op  ________________________________________________________________________    Estrada Mckeon PhD, CNP  8/24/2018  8:31 AM

## 2018-08-24 NOTE — PROGRESS NOTES
Physical Therapy    Facility/Department: [facility] C2 CARD TELEMETRY  Initial Assessment, Treatment, and Discharge Summary    NAME: Gene Castro  : 1970  MRN: [id number]    Date of Service: 2018    Discharge Recommendations:  24 hour supervision or assist   PT Equipment Recommendations  Equipment Needed: No    Patient Diagnosis(es): The primary encounter diagnosis was Preoperative testing. Diagnoses of Coronary artery disease of native artery of native heart with stable angina pectoris ([facility] [address] and Acute postoperative pain were also pertinent to this visit. has a past medical history of Bipolar affective ([facility] [address] CAD (coronary artery disease); Depression; Diabetes mellitus ([facility] [address] Hashimoto disease; Hyperlipidemia; Hypertension; Lithium intoxication; Schizophrenia ([facility] [address] and Sleep apnea. has a past surgical history that includes Coronary angioplasty (2018); Cardiac catheterization (2018); and pr cabg, arterial, three (N/A, 2018).     Restrictions  Restrictions/Precautions  Restrictions/Precautions: General Precautions, Fall Risk  Position Activity Restriction  Sternal Precautions: No Pushing, No Pulling, 5# Lifting Restrictions  Other position/activity restrictions: up in chair, ambulate pt  Vision/Hearing  Vision: Within Functional Limits  Hearing: Within functional limits     Subjective  General  Chart Reviewed: Yes  Patient assessed for rehabilitation services?: Yes  Response To Previous Treatment: Not applicable  Family / Caregiver Present: Yes (significant other)  Referring Practitioner: ADRIANNA Hanley  Referral Date : 18  Diagnosis: CAD; 18 CORONARY ARTERY BYPASS GRAFTING X3  Follows Commands: Within Functional Limits  General Comment  Comments: Pt up in chair on approach; RN cleared pt for therapy  Subjective  Subjective: pt agreeable to therapy  Pain Screening  Patient Currently in Pain: Yes  Pain Assessment  Pain Assessment: 0-10  Pain Level: 7  Pain Type:

## 2018-08-24 NOTE — PROGRESS NOTES
Satisfied     Social/Functional History  Social/Functional History  Lives With: Significant other (does have 24-hr SUP)  Type of Home: House  Home Layout: Two level, Able to Live on Main level with bedroom/bathroom (lives on first floor)  Home Access: Ramped entrance  Bathroom Shower/Tub: Tub/Shower unit  Bathroom Toilet: Standard  Bathroom Equipment: Shower chair, Hand-held shower  Home Equipment: [employer], Standard walker, Cane  ADL Assistance: Independent  Homemaking Assistance: Independent  Homemaking Responsibilities: No  Ambulation Assistance: Independent  Transfer Assistance: Independent  Active : Yes  Occupation: On disability       Objective        Orientation  Overall Orientation Status: Within Functional Limits     Balance  Sitting Balance: Modified independent   Standing Balance: Modified independent   Standing Balance  Time: >6 minutes  Activity: mobility   Sit to stand: Modified independent  Stand to sit: Modified independent  Functional Mobility  Functional - Mobility Device: No device  Activity: Other  Assist Level: Supervision  ADL  LE Dressing:  (Pt declines attempt to reach to feet for LE dressing assessment; Pt and SO report SO will assist with LE dressing at d/c)  Tone RUE  RUE Tone: Normotonic  Tone LUE  LUE Tone: Normotonic     Bed mobility  Supine to Sit: Modified independent  Sit to Supine: Modified independent  Scooting: Modified independent  Transfers  Sit to stand: Modified independent  Stand to sit: Modified independent     Cognition  Overall Cognitive Status: WFL  Cognition Comment: Pt with flat affect throughout session, cooperative       LUE AROM (degrees)  LUE AROM : WFL  LUE General AROM: to 90* shoulder flexion  Left Hand AROM (degrees)  Left Hand AROM: WFL  RUE AROM (degrees)  RUE AROM : WFL  RUE General AROM: to 90* shoulder flexion  Right Hand AROM (degrees)  Right Hand AROM: WFL  LUE Strength  LUE Strength Comment: Not formally assessed d/t sternal Time Out 0906         Minutes 20         Timed Code Treatment Minutes: [address], OTR/L

## 2018-08-27 RX ORDER — CLOPIDOGREL BISULFATE 75 MG/1
TABLET ORAL
Qty: 90 TABLET | Refills: 0 | Status: ON HOLD | OUTPATIENT
Start: 2018-08-27 | End: 2018-10-08

## 2018-08-30 NOTE — DISCHARGE SUMMARY
Hospital Medicine Discharge Summary    Patient ID: Delfina Gerardo      Patient's PCP: Domonique Zimmerman, APRN - CNP    Admit Date: 7/23/2018     Discharge Date: 7/28/2018      Admitting Physician: Chao Palacio MD     Discharge Physician: Yesenia Barillas MD     Discharge Diagnoses: Active Hospital Problems    Diagnosis Date Noted    LAYO (obstructive sleep apnea) [G47.33]     Severe nausea and vomiting [R11.2] 07/23/2018    Moderate protein-calorie malnutrition ([address] [E44.0] 07/23/2018    Acute renal failure (HCC) [N17.9]     Accidental lithium poisoning [T56.891A]     Bipolar affective ([address] [F31.9]        The patient was seen and examined on day of discharge and this discharge summary is in conjunction with any daily progress note from day of discharge. Hospital Course: The patient is a 53 y.o. male with PMH below, presents with N/V/D, fatigue and generalized weakness. He reports that he has been nauseated since Tues and he began having emesis on Fri. He reports that he has been unable to keep anything down. He was placed on Reglan by his PCP. This has not helped much. He was also to have an VOLODYMYR. He has had some chest pain and upper abd pain w/ emesis and dry heaves. He has hx of chronic CP and has known hx of chronic occlusion of his LAD. He denies abd pain. Of note, he has hx of bipolar and schizophrenia and is on lithium. He also has hx of hashimoto's disease.         Acute renal failure  Acute lithium toxicity  - admitted to ICU.   Intensivist and nephro consulted   - vas cath placed and underwent HD on 7/23  - Renal function monitored- VOLODYMYR resolved  - lithium level monitored- down to 0.8 at last check  - bleeding from vas cath 7/24- now resolved   - Acute renal failure resolved, lithium toxicity resolved-hemodialysis catheter  Removed 7/25   - avoid nephrotoxic meds on d/c      Hashimoto's thyroiditis  - PO synthroid continued      Bipolar DO  - appreciate diverticulitis or other acute   process. XR CHEST PORTABLE   Final Result   Negative portable chest.                Consults:     IP CONSULT TO HOSPITALIST  IP CONSULT TO NEPHROLOGY  IP CONSULT TO CRITICAL CARE  IP CONSULT TO PSYCHIATRY    Disposition:  SNF    Condition at Discharge: Stable    Discharge Instructions/Follow-up:  PCP in 1 week    Code Status:  Prior     Activity: activity as tolerated    Diet: regular diet      Discharge Medications:     Discharge Medication List as of 7/28/2018 12:32 PM           Details   lamoTRIgine (LAMICTAL) 25 MG tablet Take 25 mgs PO Qdaily for 10 days, then take 50mgs PO Qdaily for next 14 days, then take 75 mgs PO Qdaily for next 14 days, then take 100mgs PO Qdaily for next 14 days, then take 125mgs PO Qdaily for next 14 days, then take 150mgs PO Qdaily. , Disp-60 tab let, R-3Print      amLODIPine (NORVASC) 10 MG tablet Take 1 tablet by mouth daily, Disp-30 tablet, R-3Normal              Details   alogliptin-metformin (KAZANO) 12.5-1000 MG TABS Take 1 capsule by mouth 2 times dailyHistorical Med      insulin aspart (NOVOLOG) 100 UNIT/ML injection vial Inject 8 Units into the skin as needed for High Blood SugarHistorical Med      levothyroxine (SYNTHROID) 150 MCG tablet Take 150 mcg by mouth nightlyHistorical Med      b complex vitamins capsule Take 1 capsule by mouth 2 times dailyHistorical Med      atorvastatin (LIPITOR) 80 MG tablet Take 1 tablet by mouth nightly, Disp-90 tablet, R-3Normal      ranolazine (RANEXA) 1000 MG extended release tablet Take 1 tablet by mouth 2 times daily, Disp-60 tablet, R-6Normal      famotidine (PEPCID) 20 MG tablet Take 1 tablet by mouth 2 times daily, Disp-60 tablet, R-0Print      insulin glargine (BASAGLAR KWIKPEN) 100 UNIT/ML injection pen Inject 10 Units into the skin every morning Historical Med      Cinnamon 500 MG CAPS Take 1 capsule by mouth dailyHistorical Med      Omega-3 Fatty Acids (FISH OIL) 1360 MG CAPS Take 1 capsule by

## 2018-09-05 ENCOUNTER — OFFICE VISIT (OUTPATIENT)
Dept: CARDIOTHORACIC SURGERY | Age: 48
End: 2018-09-05

## 2018-09-05 VITALS
SYSTOLIC BLOOD PRESSURE: 104 MMHG | TEMPERATURE: 99.2 F | WEIGHT: 197 LBS | HEART RATE: 61 BPM | DIASTOLIC BLOOD PRESSURE: 68 MMHG | BODY MASS INDEX: 26.68 KG/M2 | OXYGEN SATURATION: 95 % | HEIGHT: 72 IN

## 2018-09-05 DIAGNOSIS — Z95.1 STATUS POST CORONARY ARTERY BYPASS GRAFT: ICD-10-CM

## 2018-09-05 DIAGNOSIS — I25.10 CAD IN NATIVE ARTERY: ICD-10-CM

## 2018-09-05 DIAGNOSIS — I25.118 CORONARY ARTERY DISEASE OF NATIVE ARTERY OF NATIVE HEART WITH STABLE ANGINA PECTORIS (HCC): ICD-10-CM

## 2018-09-05 DIAGNOSIS — G89.18 POST-OPERATIVE PAIN: Primary | ICD-10-CM

## 2018-09-05 DIAGNOSIS — I20.9 ANGINA, CLASS IV (HCC): ICD-10-CM

## 2018-09-05 PROCEDURE — 99024 POSTOP FOLLOW-UP VISIT: CPT | Performed by: THORACIC SURGERY (CARDIOTHORACIC VASCULAR SURGERY)

## 2018-09-05 RX ORDER — AMOXICILLIN AND CLAVULANATE POTASSIUM 875; 125 MG/1; MG/1
1 TABLET, FILM COATED ORAL 2 TIMES DAILY
Qty: 20 TABLET | Refills: 0 | Status: SHIPPED | OUTPATIENT
Start: 2018-09-05 | End: 2018-09-15

## 2018-09-05 RX ORDER — OXYCODONE HYDROCHLORIDE AND ACETAMINOPHEN 5; 325 MG/1; MG/1
1 TABLET ORAL EVERY 6 HOURS PRN
Qty: 28 TABLET | Refills: 0 | Status: SHIPPED | OUTPATIENT
Start: 2018-09-05 | End: 2018-09-12

## 2018-09-05 NOTE — H&P
Department of Cardiovascular & Thoracic Surgery  History and Physical           DIAGNOSIS:  Coronary Artery Disease     CHIEF COMPLAINT:  Angina, Class VI,  of native artery     History Obtained From:  patient, domestic partner     HISTORY OF PRESENT ILLNESS:       The patient is a 53 y.o. male with significant past medical history of CAD - chronic occlusion of LAD, Bipolar/schizophrenia (Lithium), T2DM, VOLODYMYR, Hashimoto's thyroiditis, LAYO (CPAP) and recent lithium intoxication following nausea/vomtining who presents for a scheduled C with Dr Diana Dunn. The patient underwent an coronary angioplasty (5/2018) and catheterization (3/2018) and an investment angioplasty was performed. A repeat attempt to revascularize the  today was unsuccessful and he was referred for surgical revascularization. He has been on Plavix - last dose was 8/14/18     Past Medical History:    Past Medical History             Diagnosis Date    Bipolar affective ([address]      CAD (coronary artery disease) 04/2018      of mLAD    Depression      Diabetes mellitus [facility]      Hashimoto disease      Hyperlipidemia      Hypertension      Schizophrenia ([facility]      Sleep apnea           Past Surgical History:    Past Surgical History             Procedure Laterality Date    CARDIAC CATHETERIZATION   03/22/2018     100% occlusion of the mLAD    CORONARY ANGIOPLASTY   05/29/2018     POBA of LAD         Medications Prior to Admission:     Prescriptions Prior to Admission   Prescriptions Prior to Admission: lisinopril (PRINIVIL;ZESTRIL) 10 MG tablet, Take 10 mg by mouth daily  Chromium 200 MCG CAPS, Take 1 mg by mouth  lamoTRIgine (LAMICTAL) 25 MG tablet, Take 25 mgs PO Qdaily for 10 days, then take 50mgs PO Qdaily for next 14 days, then take 75 mgs PO Qdaily for next 14 days, then take 100mgs PO Qdaily for next 14 days, then take 125mgs PO Qdaily for next 14 days, then take 150mgs PO Qdaily.   alogliptin-metformin (KAZANO) 12.5-1000 MG TABS, Take 1 capsule by mouth 2 times daily  insulin aspart (NOVOLOG) 100 UNIT/ML injection vial, Inject 8 Units into the skin as needed for High Blood Sugar  levothyroxine (SYNTHROID) 150 MCG tablet, Take 150 mcg by mouth nightly  b complex vitamins capsule, Take 1 capsule by mouth 2 times daily  atorvastatin (LIPITOR) 80 MG tablet, Take 1 tablet by mouth nightly  ranolazine (RANEXA) 1000 MG extended release tablet, Take 1 tablet by mouth 2 times daily  famotidine (PEPCID) 20 MG tablet, Take 1 tablet by mouth 2 times daily  Cinnamon 500 MG CAPS, Take 1 capsule by mouth daily  Omega-3 Fatty Acids (FISH OIL) 1360 MG CAPS, Take 1 capsule by mouth daily  insulin glargine (BASAGLAR KWIKPEN) 100 UNIT/ML injection pen, Inject 10 Units into the skin every morning   metoprolol succinate (TOPROL XL) 25 MG extended release tablet, Take 1 tablet by mouth daily  isosorbide mononitrate (IMDUR) 60 MG extended release tablet, Take 1 tablet by mouth daily  aspirin 81 MG EC tablet, Take 1 tablet by mouth daily  clopidogrel (PLAVIX) 75 MG tablet, Take 1 tablet by mouth daily  nitroGLYCERIN (NITROSTAT) 0.4 MG SL tablet, up to max of 3 total doses. If no relief after 1 dose, call [phone].  folic acid (FOLVITE) 1 MG tablet, Take 1 mg by mouth daily  amLODIPine (NORVASC) 10 MG tablet, Take 1 tablet by mouth daily        Allergies:  Dye [iodides]     Social History:    TOBACCO:  Currently smokes. Type of tobacco used:  Cigar. Quantity per day:  1 cigar(s). Duration of tobacco use:  4 years. Past attempts to quit? Yes; Smoking Cessation Modalities used:  Quitting cold turkey. Smoking cessation advice provided?  yes. ETOH:   reports that he does not drink alcohol.   CAFFEINE ABUSE:  No  DRUGS:  Never used recreational drugs  LIFESTYLE: Sedentary    MARITAL STATUS:  domestic partnership Sammi - he has no children  OCCUPATION:  unknown      Family History:    Family History   No family history on file.        REVIEW OF SYSTEMS: ischemia, or deformities. o peripheral edema.     Skin: no rashes     Neurological/Psychiatric: grossly non-focal     DATA:  EK18  SB  HR 54     CBC:         Lab Results   Component Value Date     WBC 7.6 2018     RBC 4.11 2018     HGB 12.1 2018     HCT 35.1 2018     MCV 85.3 2018     MCH 29.4 2018     MCHC 34.5 2018     RDW 14.5 2018      2018     MPV 8.1 2018         CXR 18  No acute pathology     ECHO 3/21/18  Summary   Normal LV size, wall thickness, and systolic function with an estimated   ejection fraction of 55-60%. No regional wall motion abnormalities are seen.   Normal left ventricular diastolic filling pressure.   Mild mitral and tricuspid regurgitation.   Systolic pulmonary artery pressure (SPAP) estimated at 46 mmHg (RA pressure   8 mmHg), consistent with mild pulmonary hypertension     LHC:  18 Results per Diana Lay - pending.      ASSESSMENT AND PLAN:  STS Cardiac Surgery Risk profile:  CABG     Mortality:  0.32%  Morbidity and Mortality:  6.94%  Long LOS:  1.79%  Short LOS:  69.7%  Permanent Stroke:  0.22%  Prolonged Ventilation:  3.71%  Deep Sternal Infection:  0.29%  Renal Failure:  1.29%  Reoperation:  2.76%     54yo male with history of chronic occlusion of LAD and chronic Angina VI. Will plan for surgery probably Monday - off Plavix - may need to bridge  Office to coordinate.      Patient seen and examined chart was reviewed agree with the previous plan  Surgery will be LIMA to LAD reverse saphenous vein to diagonal Monday

## 2018-09-05 NOTE — PATIENT INSTRUCTIONS
per week, preferably daily. Make an appointment with your cardiologist Dr. Timothy Maxwell 3-4 weeks after your surgery. [x] WEIGHT REDUCTION: Your ideal body mass index is 18.5-24.9 kg/m2. Your body mass index is Body mass index is 26.72 kg/m². . You may calculate this by using the following formula: (weight in pounds X 0.45) / (height in inches X 0.0254) squared. A waist circumference of < 40 inches for men and < 35 inches for women is recommended. A 10% weight reduction can lower your risk of future events. [x] DIABETES: Your HbA1c should be <7%. Diet, exercise, weight reduction and proper medications can reduce your body's tendency toward high blood sugar. Check with your PCP for assistance. [x] PROPER MEDICATIONS:  [x] Aspirin  [] ACE inhibitor / ARB  (-opril / -sartan)  [x] Beta-blocker (-olol or -ilol)  [x] Statin    Risk factor management works. The person for whom this is most important is YOU. Post this information on your refrigerator or other prominent place as a reminder to you. Please call or write if you have further questions. We want you and your operation to last for many more years.     MAY LIFT MORE THAN 5 LBS ON October 1, 2018

## 2018-09-17 ENCOUNTER — OFFICE VISIT (OUTPATIENT)
Dept: CARDIOLOGY CLINIC | Age: 48
End: 2018-09-17

## 2018-09-17 VITALS
HEART RATE: 75 BPM | DIASTOLIC BLOOD PRESSURE: 90 MMHG | BODY MASS INDEX: 26.68 KG/M2 | HEIGHT: 72 IN | SYSTOLIC BLOOD PRESSURE: 146 MMHG | WEIGHT: 197 LBS | OXYGEN SATURATION: 99 %

## 2018-09-17 DIAGNOSIS — Z95.1 S/P CABG X 2: ICD-10-CM

## 2018-09-17 DIAGNOSIS — I25.10 CORONARY ARTERY DISEASE INVOLVING NATIVE CORONARY ARTERY OF NATIVE HEART WITHOUT ANGINA PECTORIS: Primary | ICD-10-CM

## 2018-09-17 DIAGNOSIS — I25.10 CHRONIC TOTAL OCCLUSION OF NATIVE CORONARY ARTERY: ICD-10-CM

## 2018-09-17 DIAGNOSIS — I25.82 CHRONIC TOTAL OCCLUSION OF NATIVE CORONARY ARTERY: ICD-10-CM

## 2018-09-17 DIAGNOSIS — I10 ESSENTIAL HYPERTENSION: ICD-10-CM

## 2018-09-17 DIAGNOSIS — E78.2 MIXED HYPERLIPIDEMIA: ICD-10-CM

## 2018-09-17 PROCEDURE — 1111F DSCHRG MED/CURRENT MED MERGE: CPT | Performed by: INTERNAL MEDICINE

## 2018-09-17 PROCEDURE — 99214 OFFICE O/P EST MOD 30 MIN: CPT | Performed by: INTERNAL MEDICINE

## 2018-09-17 PROCEDURE — 1036F TOBACCO NON-USER: CPT | Performed by: INTERNAL MEDICINE

## 2018-09-17 PROCEDURE — G8598 ASA/ANTIPLAT THER USED: HCPCS | Performed by: INTERNAL MEDICINE

## 2018-09-17 PROCEDURE — G8427 DOCREV CUR MEDS BY ELIG CLIN: HCPCS | Performed by: INTERNAL MEDICINE

## 2018-09-17 PROCEDURE — G8417 CALC BMI ABV UP PARAM F/U: HCPCS | Performed by: INTERNAL MEDICINE

## 2018-09-17 RX ORDER — OXYCODONE HYDROCHLORIDE AND ACETAMINOPHEN 5; 325 MG/1; MG/1
1 TABLET ORAL EVERY 6 HOURS PRN
Status: ON HOLD | COMMUNITY
End: 2018-10-08 | Stop reason: HOSPADM

## 2018-09-17 RX ORDER — LISINOPRIL 5 MG/1
5 TABLET ORAL DAILY
Qty: 30 TABLET | Refills: 5 | Status: SHIPPED | OUTPATIENT
Start: 2018-09-17 | End: 2019-03-27 | Stop reason: SDUPTHER

## 2018-09-17 RX ORDER — ATORVASTATIN CALCIUM 40 MG/1
40 TABLET, FILM COATED ORAL NIGHTLY
Qty: 30 TABLET | Refills: 6 | Status: SHIPPED | OUTPATIENT
Start: 2018-09-17 | End: 2021-04-14

## 2018-09-17 NOTE — LETTER
[address] Cardiology - [address] [place] [address] Octavio Giraldo [facility]. [address]  Phone: [phone]  Fax: [phone]    Mc Linda MD        2018     ANA Richards CNP  [address]  [place] [address] [facility]    Patient: Corinne Grebe  MR Number: [id number]  YOB: 1970  Date of Visit: 2018    Dear Dr. Presley Osborne:    Below are the relevant portions of my assessment and plan of care. Section of Cardiology                                     Cardiovascular Evaluation      PATIENT: Corinne Grebe  DATE: 2018  MRN: [id number]  CSN: [id number]  : 1970    Primary Care Doctor: ANA Richards CNP    Reason for evaluation:   CAD    Assessment:  - CAD, s/p CABG 2v (LIMA-LAD, SVG-Diag2) - Aug 18'  - Hypertension  - Hyperlipidemia    Recommendation:  -  He has remarkably well since the CABG. He was a failed attempt on his LAD chronic total occlusion (). Due to two separate  in mid and distal LAD, PCI of the CTOs was unsuccessful. His angina has mostly resolved since the CABG. - His BP is elevated, goal < 130/80 mmHg. I will add Lisinopril 5 mg daily with BMP in 5 days. He will continue Lopressor.  - He will continue Asa. I will increase his Lipitor to 40 mg. Last LDL in May 18' was at goal (< 70). - He will start cardiac rehab once cleared by Dr. Annamarie Sullivan. - He will see one of the NPs in three months. If you have questions, please do not hesitate to call me. I look forward to following Corinne Grebe along with you. Jasson Lucas MD, [facility] [place], [place]  [phone] [facility] office  [phone] [facility]  2018 1:48 PM      If you have questions, please do not hesitate to call me. I look forward to following Annalise Amado along with you.     Sincerely,        Mc Linda MD

## 2018-09-17 NOTE — PROGRESS NOTES
Family History:  family history is not on file. Home Medications:  Reviewed and are listed in nursing record. and/or listed below  Current Outpatient Prescriptions   Medication Sig Dispense Refill    oxyCODONE-acetaminophen (PERCOCET) 5-325 MG per tablet Take 1 tablet by mouth every 6 hours as needed for Pain. Marbin Dc clopidogrel (PLAVIX) 75 MG tablet TAKE ONE TABLET BY MOUTH DAILY 90 tablet 0    lamoTRIgine (LAMICTAL) 25 MG tablet Take 3 tablets PO daily. Follow up with MD for further titrations. (Patient taking differently: Take 100 mg by mouth daily ) 15 tablet 0    atorvastatin (LIPITOR) 20 MG tablet Take 1 tablet by mouth nightly 30 tablet 3    metoprolol tartrate (LOPRESSOR) 50 MG tablet Take 1 tablet by mouth 2 times daily 60 tablet 3    docusate sodium (COLACE, DULCOLAX) 100 MG CAPS Take 100 mg by mouth 2 times daily      paliperidone Palmitate (INVEGA TRINZA) 819 MG/2.625ML SUSP IM injection Inject 819 mg into the muscle every 3 months      alogliptin-metformin (KAZANO) 12.5-1000 MG TABS Take 1 capsule by mouth 2 times daily      insulin aspart (NOVOLOG) 100 UNIT/ML injection vial Inject 8 Units into the skin 3 times daily (before meals) For high blood sugar as needed      levothyroxine (SYNTHROID) 150 MCG tablet Take 150 mcg by mouth nightly      b complex vitamins capsule Take 1 capsule by mouth 2 times daily      insulin glargine (BASAGLAR KWIKPEN) 100 UNIT/ML injection pen Inject 14 Units into the skin every morning       aspirin 81 MG EC tablet Take 1 tablet by mouth daily 30 tablet 3    folic acid (FOLVITE) 1 MG tablet Take 1 mg by mouth daily       No current facility-administered medications for this visit. Allergies:  Dye [iodides]     Review of Systems:   All 14 point review of symptoms completed. Pertinent positives identified in the HPI, all other review of symptoms negative. Physical Examination:    There were no vitals taken for this visit.      General Appearance:  Alert, cooperative, no distress, appears stated age       Head:  Normocephalic, without obvious abnormality, atraumatic   Eyes:  PERRL, conjunctiva/corneas clear       Nose: Nares normal, no drainage or sinus tenderness   Throat: Lips, mucosa, and tongue normal       Neck: Supple, symmetrical, trachea midline, no adenopathy, thyroid: not enlarged, symmetric, no tenderness/mass/nodules, no carotid bruit or JVD       Lungs:   Clear to auscultation bilaterally, respirations unlabored   Chest Wall:  No tenderness or deformity       Heart:  Regular rhythm and normal rate; S1, S2 are normal; no murmur noted; no rub or gallop       Abdomen:   Soft, non-tender, bowel sounds active all four quadrants,  no masses, no organomegaly       Extremities: No cyanosis or edema. No deformity of hands or feet   Pulses: 2+ and symmetric       Skin: Skin color, texture, turgor normal. No rashes or lesions       Pysch: Normal mood and affect.  Alert, oriented x 3       Neurologic: Normal gross motor and sensory exam.       DIAGNOSTIC WORK UP:  Lab Data:  CBC:   Lab Results   Component Value Date    WBC 8.9 08/24/2018    HGB 8.5 08/24/2018    HCT 24.9 08/24/2018    MCV 85.4 08/24/2018     08/24/2018     BMP:   Lab Results   Component Value Date     08/24/2018    K 4.5 08/24/2018    K 4.5 08/15/2018     08/24/2018    CO2 24 08/24/2018    PHOS 2.6 07/28/2018    BUN 13 08/24/2018    CREATININE 0.7 08/24/2018    CALCIUM 9.1 08/24/2018    GFRAA >60 08/24/2018    GFRAA >60 01/15/2012    MG 2.10 08/24/2018     LFTS:   Lab Results   Component Value Date    ALT 11 07/24/2018    AST 19 07/24/2018    ALKPHOS 86 07/24/2018    PROT 5.6 07/24/2018    PROT 7.1 01/13/2012    AGRATIO 1.5 07/24/2018    BILITOT 0.8 07/24/2018     PT/INR: No results found for: PTINR  LIPID PANEL: No components found for: CHLPL  Lab Results   Component Value Date    TRIG 77 05/29/2018    TRIG 172 (H) 03/21/2018     Lab Results   Component Value Hyperlipidemia    Recommendation:  -  He has remarkably well since the CABG. He was a failed attempt on his LAD chronic total occlusion (). Due to two separate  in mid and distal LAD, PCI of the CTOs was unsuccessful. His angina has mostly resolved since the CABG. - His BP is elevated, goal < 130/80 mmHg. I will add Lisinopril 5 mg daily with BMP in 5 days. He will continue Lopressor.  - He will continue Asa. I will increase his Lipitor to 40 mg. Last LDL in May 18' was at goal (< 70). - He will start cardiac rehab once cleared by Dr. Isidra Deleon. - He will see one of the NPs in three months. If you have questions, please do not hesitate to call me. I look forward to following Marybel Galeano along with you.       Taye Garcia MD, [facility] [place], [place]  [phone] [facility] office  [phone] Main central  9/17/2018 1:48 PM

## 2018-09-25 ENCOUNTER — HOSPITAL ENCOUNTER (OUTPATIENT)
Age: 48
Discharge: HOME | End: 2018-09-25
Payer: MEDICARE

## 2018-09-25 DIAGNOSIS — I10 ESSENTIAL HYPERTENSION: ICD-10-CM

## 2018-09-25 LAB
ANION GAP SERPL CALCULATED.3IONS-SCNC: 15 MMOL/L (ref 3–16)
BUN BLDV-MCNC: 13 MG/DL (ref 7–20)
CALCIUM SERPL-MCNC: 9.9 MG/DL (ref 8.3–10.6)
CHLORIDE BLD-SCNC: 105 MMOL/L (ref 99–110)
CO2: 23 MMOL/L (ref 21–32)
CREAT SERPL-MCNC: 1 MG/DL (ref 0.9–1.3)
GFR AFRICAN AMERICAN: >60
GFR NON-AFRICAN AMERICAN: >60
GLUCOSE BLD-MCNC: 92 MG/DL (ref 70–99)
POTASSIUM SERPL-SCNC: 4 MMOL/L (ref 3.5–5.1)
SODIUM BLD-SCNC: 143 MMOL/L (ref 136–145)

## 2018-09-25 PROCEDURE — 80048 BASIC METABOLIC PNL TOTAL CA: CPT

## 2018-09-25 PROCEDURE — 36415 COLL VENOUS BLD VENIPUNCTURE: CPT

## 2018-09-26 ENCOUNTER — OFFICE VISIT (OUTPATIENT)
Dept: CARDIOTHORACIC SURGERY | Age: 48
End: 2018-09-26

## 2018-09-26 VITALS
TEMPERATURE: 98.6 F | BODY MASS INDEX: 25.87 KG/M2 | WEIGHT: 191 LBS | SYSTOLIC BLOOD PRESSURE: 112 MMHG | OXYGEN SATURATION: 97 % | HEIGHT: 72 IN | HEART RATE: 66 BPM | DIASTOLIC BLOOD PRESSURE: 68 MMHG

## 2018-09-26 DIAGNOSIS — Z95.1 S/P CABG X 2: Primary | ICD-10-CM

## 2018-09-26 PROCEDURE — 99024 POSTOP FOLLOW-UP VISIT: CPT | Performed by: THORACIC SURGERY (CARDIOTHORACIC VASCULAR SURGERY)

## 2018-09-26 NOTE — PROGRESS NOTES
Status post coronary artery bypass ×4  Postop follow-up second  Dr. Lokesh Schwarz  No major complain or events since discharge  His vital signs stable afebrile  S1 plus S2 +0 no additional sounds  Bilaterally clear no additional sounds  Abdomen soft nondistended nontender bowel sounds positive  Incision is dry and clean  Plan  #1 increase his blood pressure medication to twice a day  #2 follow-up with cardiology   #3 no need for a few the follow-up with us

## 2018-10-03 ENCOUNTER — HOSPITAL ENCOUNTER (INPATIENT)
Age: 48
LOS: 5 days | Discharge: HOME HEALTH | DRG: 356 | End: 2018-10-08
Attending: HOSPITALIST | Admitting: HOSPITALIST
Payer: MEDICARE

## 2018-10-03 ENCOUNTER — APPOINTMENT (OUTPATIENT)
Dept: GENERAL RADIOLOGY | Age: 48
DRG: 377 | End: 2018-10-03
Payer: MEDICARE

## 2018-10-03 ENCOUNTER — APPOINTMENT (OUTPATIENT)
Dept: CT IMAGING | Age: 48
DRG: 377 | End: 2018-10-03
Payer: MEDICARE

## 2018-10-03 ENCOUNTER — HOSPITAL ENCOUNTER (OUTPATIENT)
Dept: INTERVENTIONAL RADIOLOGY/VASCULAR | Age: 48
DRG: 356 | End: 2018-10-03
Payer: MEDICARE

## 2018-10-03 ENCOUNTER — ANESTHESIA EVENT (OUTPATIENT)
Dept: ENDOSCOPY | Age: 48
DRG: 356 | End: 2018-10-03
Payer: MEDICARE

## 2018-10-03 ENCOUNTER — APPOINTMENT (OUTPATIENT)
Dept: INTERVENTIONAL RADIOLOGY/VASCULAR | Age: 48
DRG: 356 | End: 2018-10-03
Attending: HOSPITALIST
Payer: MEDICARE

## 2018-10-03 ENCOUNTER — ANESTHESIA (OUTPATIENT)
Dept: ENDOSCOPY | Age: 48
DRG: 356 | End: 2018-10-03
Payer: MEDICARE

## 2018-10-03 ENCOUNTER — HOSPITAL ENCOUNTER (INPATIENT)
Age: 48
LOS: 1 days | Discharge: ANOTHER HOSPITAL | DRG: 377 | End: 2018-10-03
Attending: EMERGENCY MEDICINE | Admitting: INTERNAL MEDICINE
Payer: MEDICARE

## 2018-10-03 ENCOUNTER — HOSPITAL ENCOUNTER (OUTPATIENT)
Dept: CT IMAGING | Age: 48
Discharge: HOME | DRG: 356 | End: 2018-10-03
Payer: MEDICARE

## 2018-10-03 VITALS
SYSTOLIC BLOOD PRESSURE: 112 MMHG | WEIGHT: 184 LBS | BODY MASS INDEX: 24.92 KG/M2 | RESPIRATION RATE: 16 BRPM | HEIGHT: 72 IN | HEART RATE: 82 BPM | OXYGEN SATURATION: 100 % | TEMPERATURE: 97.8 F | DIASTOLIC BLOOD PRESSURE: 67 MMHG

## 2018-10-03 VITALS — WEIGHT: 184 LBS | BODY MASS INDEX: 24.95 KG/M2

## 2018-10-03 DIAGNOSIS — K25.4 GASTROINTESTINAL HEMORRHAGE ASSOCIATED WITH GASTRIC ULCER: ICD-10-CM

## 2018-10-03 DIAGNOSIS — K57.32 DIVERTICULITIS OF COLON: Primary | ICD-10-CM

## 2018-10-03 DIAGNOSIS — K92.2 LOWER GI BLEED: ICD-10-CM

## 2018-10-03 LAB
A/G RATIO: 1.2 (ref 1.1–2.2)
A/G RATIO: 1.6 (ref 1.1–2.2)
ABO/RH: NORMAL
ABO/RH: NORMAL
ALBUMIN SERPL-MCNC: 2.4 G/DL (ref 3.4–5)
ALBUMIN SERPL-MCNC: 3.7 G/DL (ref 3.4–5)
ALP BLD-CCNC: 42 U/L (ref 40–129)
ALP BLD-CCNC: 63 U/L (ref 40–129)
ALT SERPL-CCNC: 8 U/L (ref 10–40)
ALT SERPL-CCNC: 9 U/L (ref 10–40)
ANION GAP SERPL CALCULATED.3IONS-SCNC: 14 MMOL/L (ref 3–16)
ANION GAP SERPL CALCULATED.3IONS-SCNC: 6 MMOL/L (ref 3–16)
ANION GAP SERPL CALCULATED.3IONS-SCNC: 6 MMOL/L (ref 3–16)
ANION GAP SERPL CALCULATED.3IONS-SCNC: 9 MMOL/L (ref 3–16)
ANTIBODY SCREEN: NORMAL
ANTIBODY SCREEN: NORMAL
AST SERPL-CCNC: 16 U/L (ref 15–37)
AST SERPL-CCNC: 9 U/L (ref 15–37)
BASOPHILS ABSOLUTE: 0 K/UL (ref 0–0.2)
BASOPHILS RELATIVE PERCENT: 0.2 %
BASOPHILS RELATIVE PERCENT: 0.3 %
BASOPHILS RELATIVE PERCENT: 0.3 %
BASOPHILS RELATIVE PERCENT: 0.4 %
BILIRUB SERPL-MCNC: 0.3 MG/DL (ref 0–1)
BILIRUB SERPL-MCNC: 1.3 MG/DL (ref 0–1)
BILIRUBIN URINE: NEGATIVE
BLOOD BANK DISPENSE STATUS: NORMAL
BLOOD BANK PRODUCT CODE: NORMAL
BLOOD, URINE: NEGATIVE
BPU ID: NORMAL
BUN BLDV-MCNC: 16 MG/DL (ref 7–20)
BUN BLDV-MCNC: 18 MG/DL (ref 7–20)
BUN BLDV-MCNC: 20 MG/DL (ref 7–20)
BUN BLDV-MCNC: 20 MG/DL (ref 7–20)
CALCIUM IONIZED: 1.03 MMOL/L (ref 1.12–1.32)
CALCIUM SERPL-MCNC: 10 MG/DL (ref 8.3–10.6)
CALCIUM SERPL-MCNC: 6.9 MG/DL (ref 8.3–10.6)
CALCIUM SERPL-MCNC: 7 MG/DL (ref 8.3–10.6)
CALCIUM SERPL-MCNC: 8.2 MG/DL (ref 8.3–10.6)
CHLORIDE BLD-SCNC: 106 MMOL/L (ref 99–110)
CHLORIDE BLD-SCNC: 115 MMOL/L (ref 99–110)
CLARITY: CLEAR
CO2: 18 MMOL/L (ref 21–32)
CO2: 19 MMOL/L (ref 21–32)
CO2: 19 MMOL/L (ref 21–32)
CO2: 23 MMOL/L (ref 21–32)
COLOR: YELLOW
CREAT SERPL-MCNC: 0.9 MG/DL (ref 0.9–1.3)
CREAT SERPL-MCNC: 1 MG/DL (ref 0.9–1.3)
DESCRIPTION BLOOD BANK: NORMAL
EOSINOPHILS ABSOLUTE: 0 K/UL (ref 0–0.6)
EOSINOPHILS ABSOLUTE: 0.2 K/UL (ref 0–0.6)
EOSINOPHILS RELATIVE PERCENT: 0 %
EOSINOPHILS RELATIVE PERCENT: 0.1 %
EOSINOPHILS RELATIVE PERCENT: 0.2 %
EOSINOPHILS RELATIVE PERCENT: 1.7 %
FIBRINOGEN: 343 MG/DL (ref 200–397)
GFR AFRICAN AMERICAN: >60
GFR NON-AFRICAN AMERICAN: >60
GLOBULIN: 1.5 G/DL
GLOBULIN: 3.1 G/DL
GLUCOSE BLD-MCNC: 140 MG/DL (ref 70–99)
GLUCOSE BLD-MCNC: 186 MG/DL (ref 70–99)
GLUCOSE BLD-MCNC: 207 MG/DL (ref 70–99)
GLUCOSE BLD-MCNC: 214 MG/DL (ref 70–99)
GLUCOSE BLD-MCNC: 219 MG/DL (ref 70–99)
GLUCOSE URINE: NEGATIVE MG/DL
HCT VFR BLD CALC: 21.5 % (ref 40.5–52.5)
HCT VFR BLD CALC: 22.3 % (ref 40.5–52.5)
HCT VFR BLD CALC: 23.3 % (ref 40.5–52.5)
HCT VFR BLD CALC: 25.2 % (ref 40.5–52.5)
HCT VFR BLD CALC: 30.5 % (ref 40.5–52.5)
HEMOGLOBIN: 6.9 G/DL (ref 13.5–17.5)
HEMOGLOBIN: 7.5 G/DL (ref 13.5–17.5)
HEMOGLOBIN: 7.7 G/DL (ref 13.5–17.5)
HEMOGLOBIN: 8.2 G/DL (ref 13.5–17.5)
HEMOGLOBIN: 9.8 G/DL (ref 13.5–17.5)
INR BLD: 1.22 (ref 0.86–1.14)
INR BLD: 1.38 (ref 0.86–1.14)
KETONES, URINE: NEGATIVE MG/DL
LACTIC ACID: 1 MMOL/L (ref 0.4–2)
LACTIC ACID: 1.7 MMOL/L (ref 0.4–2)
LACTIC ACID: 2.8 MMOL/L (ref 0.4–2)
LEUKOCYTE ESTERASE, URINE: NEGATIVE
LYMPHOCYTES ABSOLUTE: 0.4 K/UL (ref 1–5.1)
LYMPHOCYTES ABSOLUTE: 0.9 K/UL (ref 1–5.1)
LYMPHOCYTES ABSOLUTE: 1 K/UL (ref 1–5.1)
LYMPHOCYTES ABSOLUTE: 2.6 K/UL (ref 1–5.1)
LYMPHOCYTES RELATIVE PERCENT: 10.2 %
LYMPHOCYTES RELATIVE PERCENT: 13.3 %
LYMPHOCYTES RELATIVE PERCENT: 20.2 %
LYMPHOCYTES RELATIVE PERCENT: 4.4 %
MAGNESIUM: 1.4 MG/DL (ref 1.8–2.4)
MCH RBC QN AUTO: 27.5 PG (ref 26–34)
MCH RBC QN AUTO: 27.8 PG (ref 26–34)
MCH RBC QN AUTO: 28.8 PG (ref 26–34)
MCH RBC QN AUTO: 29.5 PG (ref 26–34)
MCHC RBC AUTO-ENTMCNC: 32.1 G/DL (ref 31–36)
MCHC RBC AUTO-ENTMCNC: 32.2 G/DL (ref 31–36)
MCHC RBC AUTO-ENTMCNC: 33.2 G/DL (ref 31–36)
MCHC RBC AUTO-ENTMCNC: 33.5 G/DL (ref 31–36)
MCV RBC AUTO: 85.6 FL (ref 80–100)
MCV RBC AUTO: 86.3 FL (ref 80–100)
MCV RBC AUTO: 86.6 FL (ref 80–100)
MCV RBC AUTO: 88.2 FL (ref 80–100)
MICROSCOPIC EXAMINATION: NORMAL
MONOCYTES ABSOLUTE: 0.5 K/UL (ref 0–1.3)
MONOCYTES ABSOLUTE: 0.5 K/UL (ref 0–1.3)
MONOCYTES ABSOLUTE: 0.6 K/UL (ref 0–1.3)
MONOCYTES ABSOLUTE: 0.7 K/UL (ref 0–1.3)
MONOCYTES RELATIVE PERCENT: 4.4 %
MONOCYTES RELATIVE PERCENT: 5.4 %
MONOCYTES RELATIVE PERCENT: 7 %
MONOCYTES RELATIVE PERCENT: 7.9 %
NEUTROPHILS ABSOLUTE: 6.1 K/UL (ref 1.7–7.7)
NEUTROPHILS ABSOLUTE: 7.2 K/UL (ref 1.7–7.7)
NEUTROPHILS ABSOLUTE: 8.5 K/UL (ref 1.7–7.7)
NEUTROPHILS ABSOLUTE: 9.5 K/UL (ref 1.7–7.7)
NEUTROPHILS RELATIVE PERCENT: 73.4 %
NEUTROPHILS RELATIVE PERCENT: 79.2 %
NEUTROPHILS RELATIVE PERCENT: 81.5 %
NEUTROPHILS RELATIVE PERCENT: 89.9 %
NITRITE, URINE: NEGATIVE
OCCULT BLOOD DIAGNOSTIC: ABNORMAL
PDW BLD-RTO: 14 % (ref 12.4–15.4)
PDW BLD-RTO: 14.1 % (ref 12.4–15.4)
PDW BLD-RTO: 14.6 % (ref 12.4–15.4)
PDW BLD-RTO: 16 % (ref 12.4–15.4)
PERFORMED ON: ABNORMAL
PH UA: 6
PH VENOUS: 7.38 (ref 7.35–7.45)
PHOSPHORUS: 3.4 MG/DL (ref 2.5–4.9)
PLATELET # BLD: 191 K/UL (ref 135–450)
PLATELET # BLD: 217 K/UL (ref 135–450)
PLATELET # BLD: 223 K/UL (ref 135–450)
PLATELET # BLD: 301 K/UL (ref 135–450)
PMV BLD AUTO: 7.9 FL (ref 5–10.5)
PMV BLD AUTO: 8.4 FL (ref 5–10.5)
PMV BLD AUTO: 8.4 FL (ref 5–10.5)
PMV BLD AUTO: 8.6 FL (ref 5–10.5)
POTASSIUM REFLEX MAGNESIUM: 5 MMOL/L (ref 3.5–5.1)
POTASSIUM SERPL-SCNC: 4.5 MMOL/L (ref 3.5–5.1)
POTASSIUM SERPL-SCNC: 4.6 MMOL/L (ref 3.5–5.1)
POTASSIUM SERPL-SCNC: 4.8 MMOL/L (ref 3.5–5.1)
PROTEIN UA: NEGATIVE MG/DL
PROTHROMBIN TIME: 13.8 SEC (ref 9.8–13)
PROTHROMBIN TIME: 15.7 SEC (ref 9.8–13)
RBC # BLD: 2.49 M/UL (ref 4.2–5.9)
RBC # BLD: 2.52 M/UL (ref 4.2–5.9)
RBC # BLD: 2.69 M/UL (ref 4.2–5.9)
RBC # BLD: 3.57 M/UL (ref 4.2–5.9)
SODIUM BLD-SCNC: 139 MMOL/L (ref 136–145)
SODIUM BLD-SCNC: 140 MMOL/L (ref 136–145)
SODIUM BLD-SCNC: 143 MMOL/L (ref 136–145)
SODIUM BLD-SCNC: 143 MMOL/L (ref 136–145)
SPECIFIC GRAVITY UA: >=1.03
TOTAL PROTEIN: 3.9 G/DL (ref 6.4–8.2)
TOTAL PROTEIN: 6.8 G/DL (ref 6.4–8.2)
URINE REFLEX TO CULTURE: NORMAL
URINE TYPE: NORMAL
UROBILINOGEN, URINE: 0.2 E.U./DL
WBC # BLD: 13 K/UL (ref 4–11)
WBC # BLD: 7.7 K/UL (ref 4–11)
WBC # BLD: 8.9 K/UL (ref 4–11)
WBC # BLD: 9.4 K/UL (ref 4–11)

## 2018-10-03 PROCEDURE — 6360000002 HC RX W HCPCS: Performed by: INTERNAL MEDICINE

## 2018-10-03 PROCEDURE — 83036 HEMOGLOBIN GLYCOSYLATED A1C: CPT

## 2018-10-03 PROCEDURE — 99291 CRITICAL CARE FIRST HOUR: CPT | Performed by: INTERNAL MEDICINE

## 2018-10-03 PROCEDURE — 2700000000 HC OXYGEN THERAPY PER DAY

## 2018-10-03 PROCEDURE — 2580000003 HC RX 258: Performed by: INTERNAL MEDICINE

## 2018-10-03 PROCEDURE — 85610 PROTHROMBIN TIME: CPT

## 2018-10-03 PROCEDURE — 86900 BLOOD TYPING SEROLOGIC ABO: CPT

## 2018-10-03 PROCEDURE — 2500000003 HC RX 250 WO HCPCS: Performed by: NURSE PRACTITIONER

## 2018-10-03 PROCEDURE — 36592 COLLECT BLOOD FROM PICC: CPT

## 2018-10-03 PROCEDURE — 74176 CT ABD & PELVIS W/O CONTRAST: CPT

## 2018-10-03 PROCEDURE — 0DJ08ZZ INSPECTION OF UPPER INTESTINAL TRACT, VIA NATURAL OR ARTIFICIAL OPENING ENDOSCOPIC: ICD-10-PCS | Performed by: INTERNAL MEDICINE

## 2018-10-03 PROCEDURE — 6370000000 HC RX 637 (ALT 250 FOR IP): Performed by: HOSPITALIST

## 2018-10-03 PROCEDURE — 37244 VASC EMBOLIZE/OCCLUDE BLEED: CPT

## 2018-10-03 PROCEDURE — 75774 ARTERY X-RAY EACH VESSEL: CPT

## 2018-10-03 PROCEDURE — 75726 ARTERY X-RAYS ABDOMEN: CPT

## 2018-10-03 PROCEDURE — 36415 COLL VENOUS BLD VENIPUNCTURE: CPT

## 2018-10-03 PROCEDURE — 36248 INS CATH ABD/L-EXT ART ADDL: CPT

## 2018-10-03 PROCEDURE — 6360000004 HC RX CONTRAST MEDICATION: Performed by: HOSPITALIST

## 2018-10-03 PROCEDURE — 96367 TX/PROPH/DG ADDL SEQ IV INF: CPT

## 2018-10-03 PROCEDURE — B4151ZZ FLUOROSCOPY OF INFERIOR MESENTERIC ARTERY USING LOW OSMOLAR CONTRAST: ICD-10-PCS | Performed by: INTERNAL MEDICINE

## 2018-10-03 PROCEDURE — 86901 BLOOD TYPING SEROLOGIC RH(D): CPT

## 2018-10-03 PROCEDURE — 36245 INS CATH ABD/L-EXT ART 1ST: CPT

## 2018-10-03 PROCEDURE — 2709999900 HC NON-CHARGEABLE SUPPLY: Performed by: INTERNAL MEDICINE

## 2018-10-03 PROCEDURE — B4141ZZ FLUOROSCOPY OF SUPERIOR MESENTERIC ARTERY USING LOW OSMOLAR CONTRAST: ICD-10-PCS | Performed by: INTERNAL MEDICINE

## 2018-10-03 PROCEDURE — C9113 INJ PANTOPRAZOLE SODIUM, VIA: HCPCS | Performed by: INTERNAL MEDICINE

## 2018-10-03 PROCEDURE — 71045 X-RAY EXAM CHEST 1 VIEW: CPT

## 2018-10-03 PROCEDURE — 2580000003 HC RX 258: Performed by: HOSPITALIST

## 2018-10-03 PROCEDURE — 86850 RBC ANTIBODY SCREEN: CPT

## 2018-10-03 PROCEDURE — P9031 PLATELETS LEUKOCYTES REDUCED: HCPCS

## 2018-10-03 PROCEDURE — 2000000000 HC ICU R&B

## 2018-10-03 PROCEDURE — 3700000000 HC ANESTHESIA ATTENDED CARE: Performed by: INTERNAL MEDICINE

## 2018-10-03 PROCEDURE — C1769 GUIDE WIRE: HCPCS

## 2018-10-03 PROCEDURE — 99285 EMERGENCY DEPT VISIT HI MDM: CPT

## 2018-10-03 PROCEDURE — 2500000003 HC RX 250 WO HCPCS: Performed by: EMERGENCY MEDICINE

## 2018-10-03 PROCEDURE — 6360000002 HC RX W HCPCS: Performed by: EMERGENCY MEDICINE

## 2018-10-03 PROCEDURE — 6360000002 HC RX W HCPCS: Performed by: NURSE ANESTHETIST, CERTIFIED REGISTERED

## 2018-10-03 PROCEDURE — 85025 COMPLETE CBC W/AUTO DIFF WBC: CPT

## 2018-10-03 PROCEDURE — 86923 COMPATIBILITY TEST ELECTRIC: CPT

## 2018-10-03 PROCEDURE — 36556 INSERT NON-TUNNEL CV CATH: CPT

## 2018-10-03 PROCEDURE — 74174 CTA ABD&PLVS W/CONTRAST: CPT

## 2018-10-03 PROCEDURE — 96365 THER/PROPH/DIAG IV INF INIT: CPT

## 2018-10-03 PROCEDURE — 85018 HEMOGLOBIN: CPT

## 2018-10-03 PROCEDURE — 82330 ASSAY OF CALCIUM: CPT

## 2018-10-03 PROCEDURE — 2580000003 HC RX 258: Performed by: EMERGENCY MEDICINE

## 2018-10-03 PROCEDURE — P9035 PLATELET PHERES LEUKOREDUCED: HCPCS

## 2018-10-03 PROCEDURE — 51702 INSERT TEMP BLADDER CATH: CPT

## 2018-10-03 PROCEDURE — 83605 ASSAY OF LACTIC ACID: CPT

## 2018-10-03 PROCEDURE — 36556 INSERT NON-TUNNEL CV CATH: CPT | Performed by: INTERNAL MEDICINE

## 2018-10-03 PROCEDURE — B4121ZZ FLUOROSCOPY OF HEPATIC ARTERY USING LOW OSMOLAR CONTRAST: ICD-10-PCS | Performed by: INTERNAL MEDICINE

## 2018-10-03 PROCEDURE — 36247 INS CATH ABD/L-EXT ART 3RD: CPT

## 2018-10-03 PROCEDURE — 85384 FIBRINOGEN ACTIVITY: CPT

## 2018-10-03 PROCEDURE — 04L53DZ OCCLUSION OF SUPERIOR MESENTERIC ARTERY WITH INTRALUMINAL DEVICE, PERCUTANEOUS APPROACH: ICD-10-PCS | Performed by: INTERNAL MEDICINE

## 2018-10-03 PROCEDURE — 99291 CRITICAL CARE FIRST HOUR: CPT | Performed by: SURGERY

## 2018-10-03 PROCEDURE — 2500000003 HC RX 250 WO HCPCS: Performed by: HOSPITALIST

## 2018-10-03 PROCEDURE — 80053 COMPREHEN METABOLIC PANEL: CPT

## 2018-10-03 PROCEDURE — 6360000002 HC RX W HCPCS: Performed by: HOSPITALIST

## 2018-10-03 PROCEDURE — P9016 RBC LEUKOCYTES REDUCED: HCPCS

## 2018-10-03 PROCEDURE — 86920 COMPATIBILITY TEST SPIN: CPT

## 2018-10-03 PROCEDURE — 3700000001 HC ADD 15 MINUTES (ANESTHESIA): Performed by: INTERNAL MEDICINE

## 2018-10-03 PROCEDURE — 81003 URINALYSIS AUTO W/O SCOPE: CPT

## 2018-10-03 PROCEDURE — P9059 PLASMA, FRZ BETWEEN 8-24HOUR: HCPCS

## 2018-10-03 PROCEDURE — 36430 TRANSFUSION BLD/BLD COMPNT: CPT

## 2018-10-03 PROCEDURE — 6360000004 HC RX CONTRAST MEDICATION: Performed by: NURSE PRACTITIONER

## 2018-10-03 PROCEDURE — 99223 1ST HOSP IP/OBS HIGH 75: CPT | Performed by: SURGERY

## 2018-10-03 PROCEDURE — G0328 FECAL BLOOD SCRN IMMUNOASSAY: HCPCS

## 2018-10-03 PROCEDURE — 85014 HEMATOCRIT: CPT

## 2018-10-03 PROCEDURE — 2500000003 HC RX 250 WO HCPCS: Performed by: INTERNAL MEDICINE

## 2018-10-03 PROCEDURE — 2580000003 HC RX 258

## 2018-10-03 PROCEDURE — 02HV33Z INSERTION OF INFUSION DEVICE INTO SUPERIOR VENA CAVA, PERCUTANEOUS APPROACH: ICD-10-PCS | Performed by: INTERNAL MEDICINE

## 2018-10-03 PROCEDURE — 84100 ASSAY OF PHOSPHORUS: CPT

## 2018-10-03 PROCEDURE — 83735 ASSAY OF MAGNESIUM: CPT

## 2018-10-03 PROCEDURE — 3609012800 HC EGD DIAGNOSTIC ONLY: Performed by: INTERNAL MEDICINE

## 2018-10-03 RX ORDER — MORPHINE SULFATE 2 MG/ML
2 INJECTION, SOLUTION INTRAMUSCULAR; INTRAVENOUS
Status: DISCONTINUED | OUTPATIENT
Start: 2018-10-03 | End: 2018-10-03 | Stop reason: HOSPADM

## 2018-10-03 RX ORDER — SODIUM CHLORIDE 9 MG/ML
INJECTION, SOLUTION INTRAVENOUS CONTINUOUS
Status: DISCONTINUED | OUTPATIENT
Start: 2018-10-03 | End: 2018-10-03 | Stop reason: HOSPADM

## 2018-10-03 RX ORDER — SODIUM CHLORIDE 0.9 % (FLUSH) 0.9 %
10 SYRINGE (ML) INJECTION PRN
Status: DISCONTINUED | OUTPATIENT
Start: 2018-10-03 | End: 2018-10-08 | Stop reason: HOSPADM

## 2018-10-03 RX ORDER — 0.9 % SODIUM CHLORIDE 0.9 %
250 INTRAVENOUS SOLUTION INTRAVENOUS ONCE
Status: DISCONTINUED | OUTPATIENT
Start: 2018-10-03 | End: 2018-10-08 | Stop reason: HOSPADM

## 2018-10-03 RX ORDER — 0.9 % SODIUM CHLORIDE 0.9 %
10 VIAL (ML) INJECTION EVERY 12 HOURS
Status: DISCONTINUED | OUTPATIENT
Start: 2018-10-03 | End: 2018-10-03 | Stop reason: SDUPTHER

## 2018-10-03 RX ORDER — SODIUM CHLORIDE 9 MG/ML
INJECTION, SOLUTION INTRAVENOUS CONTINUOUS
Status: DISCONTINUED | OUTPATIENT
Start: 2018-10-03 | End: 2018-10-05

## 2018-10-03 RX ORDER — LAMOTRIGINE 100 MG/1
100 TABLET ORAL DAILY
Status: DISCONTINUED | OUTPATIENT
Start: 2018-10-04 | End: 2018-10-08 | Stop reason: HOSPADM

## 2018-10-03 RX ORDER — SODIUM CHLORIDE 0.9 % (FLUSH) 0.9 %
10 SYRINGE (ML) INJECTION PRN
Status: DISCONTINUED | OUTPATIENT
Start: 2018-10-03 | End: 2018-10-03 | Stop reason: HOSPADM

## 2018-10-03 RX ORDER — 0.9 % SODIUM CHLORIDE 0.9 %
250 INTRAVENOUS SOLUTION INTRAVENOUS ONCE
Status: DISCONTINUED | OUTPATIENT
Start: 2018-10-03 | End: 2018-10-03 | Stop reason: HOSPADM

## 2018-10-03 RX ORDER — INSULIN GLARGINE 100 [IU]/ML
7 INJECTION, SOLUTION SUBCUTANEOUS EVERY MORNING
Status: DISCONTINUED | OUTPATIENT
Start: 2018-10-03 | End: 2018-10-03 | Stop reason: HOSPADM

## 2018-10-03 RX ORDER — 0.9 % SODIUM CHLORIDE 0.9 %
1000 INTRAVENOUS SOLUTION INTRAVENOUS ONCE
Status: COMPLETED | OUTPATIENT
Start: 2018-10-03 | End: 2018-10-03

## 2018-10-03 RX ORDER — ONDANSETRON 2 MG/ML
4 INJECTION INTRAMUSCULAR; INTRAVENOUS EVERY 6 HOURS PRN
Status: DISCONTINUED | OUTPATIENT
Start: 2018-10-03 | End: 2018-10-03 | Stop reason: HOSPADM

## 2018-10-03 RX ORDER — 0.9 % SODIUM CHLORIDE 0.9 %
250 INTRAVENOUS SOLUTION INTRAVENOUS ONCE
Status: DISCONTINUED | OUTPATIENT
Start: 2018-10-03 | End: 2018-10-03 | Stop reason: ALTCHOICE

## 2018-10-03 RX ORDER — NICOTINE POLACRILEX 4 MG
15 LOZENGE BUCCAL PRN
Status: DISCONTINUED | OUTPATIENT
Start: 2018-10-03 | End: 2018-10-03 | Stop reason: HOSPADM

## 2018-10-03 RX ORDER — ATORVASTATIN CALCIUM 40 MG/1
40 TABLET, FILM COATED ORAL NIGHTLY
Status: DISCONTINUED | OUTPATIENT
Start: 2018-10-03 | End: 2018-10-08 | Stop reason: HOSPADM

## 2018-10-03 RX ORDER — PROPOFOL 10 MG/ML
INJECTION, EMULSION INTRAVENOUS PRN
Status: DISCONTINUED | OUTPATIENT
Start: 2018-10-03 | End: 2018-10-03 | Stop reason: SDUPTHER

## 2018-10-03 RX ORDER — DEXTROSE MONOHYDRATE 25 G/50ML
12.5 INJECTION, SOLUTION INTRAVENOUS PRN
Status: DISCONTINUED | OUTPATIENT
Start: 2018-10-03 | End: 2018-10-03 | Stop reason: HOSPADM

## 2018-10-03 RX ORDER — PANTOPRAZOLE SODIUM 40 MG/10ML
40 INJECTION, POWDER, LYOPHILIZED, FOR SOLUTION INTRAVENOUS EVERY 12 HOURS
Status: DISCONTINUED | OUTPATIENT
Start: 2018-10-03 | End: 2018-10-03 | Stop reason: SDUPTHER

## 2018-10-03 RX ORDER — CIPROFLOXACIN 2 MG/ML
400 INJECTION, SOLUTION INTRAVENOUS EVERY 12 HOURS
Status: DISCONTINUED | OUTPATIENT
Start: 2018-10-03 | End: 2018-10-03 | Stop reason: HOSPADM

## 2018-10-03 RX ORDER — LEVOTHYROXINE SODIUM 0.15 MG/1
150 TABLET ORAL NIGHTLY
Status: DISCONTINUED | OUTPATIENT
Start: 2018-10-03 | End: 2018-10-03 | Stop reason: HOSPADM

## 2018-10-03 RX ORDER — LAMOTRIGINE 100 MG/1
100 TABLET ORAL DAILY
Status: DISCONTINUED | OUTPATIENT
Start: 2018-10-03 | End: 2018-10-03 | Stop reason: HOSPADM

## 2018-10-03 RX ORDER — SODIUM CHLORIDE 0.9 % (FLUSH) 0.9 %
10 SYRINGE (ML) INJECTION EVERY 12 HOURS SCHEDULED
Status: DISCONTINUED | OUTPATIENT
Start: 2018-10-03 | End: 2018-10-03 | Stop reason: HOSPADM

## 2018-10-03 RX ORDER — ACETAMINOPHEN 325 MG/1
650 TABLET ORAL EVERY 4 HOURS PRN
Status: DISCONTINUED | OUTPATIENT
Start: 2018-10-03 | End: 2018-10-08 | Stop reason: HOSPADM

## 2018-10-03 RX ORDER — 0.9 % SODIUM CHLORIDE 0.9 %
250 INTRAVENOUS SOLUTION INTRAVENOUS ONCE
Status: COMPLETED | OUTPATIENT
Start: 2018-10-03 | End: 2018-10-04

## 2018-10-03 RX ORDER — LIDOCAINE HYDROCHLORIDE 10 MG/ML
20 INJECTION, SOLUTION EPIDURAL; INFILTRATION; INTRACAUDAL; PERINEURAL ONCE
Status: COMPLETED | OUTPATIENT
Start: 2018-10-03 | End: 2018-10-03

## 2018-10-03 RX ORDER — SODIUM CHLORIDE 9 MG/ML
INJECTION, SOLUTION INTRAVENOUS
Status: DISCONTINUED
Start: 2018-10-03 | End: 2018-10-03 | Stop reason: HOSPADM

## 2018-10-03 RX ORDER — PANTOPRAZOLE SODIUM 40 MG/10ML
40 INJECTION, POWDER, LYOPHILIZED, FOR SOLUTION INTRAVENOUS EVERY 12 HOURS
Status: DISCONTINUED | OUTPATIENT
Start: 2018-10-03 | End: 2018-10-03

## 2018-10-03 RX ORDER — DEXTROSE MONOHYDRATE 50 MG/ML
100 INJECTION, SOLUTION INTRAVENOUS PRN
Status: DISCONTINUED | OUTPATIENT
Start: 2018-10-03 | End: 2018-10-03 | Stop reason: HOSPADM

## 2018-10-03 RX ORDER — SODIUM CHLORIDE 9 MG/ML
INJECTION, SOLUTION INTRAVENOUS
Status: COMPLETED
Start: 2018-10-03 | End: 2018-10-03

## 2018-10-03 RX ORDER — PANTOPRAZOLE SODIUM 40 MG/10ML
80 INJECTION, POWDER, LYOPHILIZED, FOR SOLUTION INTRAVENOUS ONCE
Status: DISCONTINUED | OUTPATIENT
Start: 2018-10-03 | End: 2018-10-03

## 2018-10-03 RX ORDER — ATORVASTATIN CALCIUM 40 MG/1
40 TABLET, FILM COATED ORAL NIGHTLY
Status: DISCONTINUED | OUTPATIENT
Start: 2018-10-03 | End: 2018-10-03 | Stop reason: HOSPADM

## 2018-10-03 RX ORDER — SODIUM CHLORIDE 9 MG/ML
INJECTION, SOLUTION INTRAVENOUS ONCE
Status: COMPLETED | OUTPATIENT
Start: 2018-10-03 | End: 2018-10-03

## 2018-10-03 RX ORDER — ACETAMINOPHEN 325 MG/1
650 TABLET ORAL EVERY 4 HOURS PRN
Status: DISCONTINUED | OUTPATIENT
Start: 2018-10-03 | End: 2018-10-03 | Stop reason: HOSPADM

## 2018-10-03 RX ORDER — PANTOPRAZOLE SODIUM 40 MG/10ML
40 INJECTION, POWDER, LYOPHILIZED, FOR SOLUTION INTRAVENOUS DAILY
Status: DISCONTINUED | OUTPATIENT
Start: 2018-10-03 | End: 2018-10-08

## 2018-10-03 RX ORDER — INSULIN GLARGINE 100 [IU]/ML
7 INJECTION, SOLUTION SUBCUTANEOUS EVERY MORNING
Status: DISCONTINUED | OUTPATIENT
Start: 2018-10-03 | End: 2018-10-03 | Stop reason: SDUPTHER

## 2018-10-03 RX ORDER — SODIUM CHLORIDE 0.9 % (FLUSH) 0.9 %
10 SYRINGE (ML) INJECTION EVERY 12 HOURS SCHEDULED
Status: DISCONTINUED | OUTPATIENT
Start: 2018-10-03 | End: 2018-10-08 | Stop reason: HOSPADM

## 2018-10-03 RX ORDER — ONDANSETRON 2 MG/ML
4 INJECTION INTRAMUSCULAR; INTRAVENOUS EVERY 6 HOURS PRN
Status: DISCONTINUED | OUTPATIENT
Start: 2018-10-03 | End: 2018-10-08 | Stop reason: HOSPADM

## 2018-10-03 RX ORDER — SODIUM CHLORIDE 0.9 % (FLUSH) 0.9 %
10 SYRINGE (ML) INJECTION EVERY 12 HOURS
Status: DISCONTINUED | OUTPATIENT
Start: 2018-10-03 | End: 2018-10-03

## 2018-10-03 RX ADMIN — SODIUM CHLORIDE 1000 ML: 9 INJECTION, SOLUTION INTRAVENOUS at 20:25

## 2018-10-03 RX ADMIN — SODIUM CHLORIDE 250 ML: 9 INJECTION, SOLUTION INTRAVENOUS at 06:50

## 2018-10-03 RX ADMIN — VASOPRESSIN 0.04 UNITS/MIN: 20 INJECTION INTRAVENOUS at 11:09

## 2018-10-03 RX ADMIN — SODIUM CHLORIDE: 9 INJECTION, SOLUTION INTRAVENOUS at 17:17

## 2018-10-03 RX ADMIN — PROPOFOL 50 MG: 10 INJECTION, EMULSION INTRAVENOUS at 18:21

## 2018-10-03 RX ADMIN — SODIUM CHLORIDE 250 ML: 9 INJECTION, SOLUTION INTRAVENOUS at 10:38

## 2018-10-03 RX ADMIN — CIPROFLOXACIN 400 MG: 2 INJECTION, SOLUTION INTRAVENOUS at 10:41

## 2018-10-03 RX ADMIN — SODIUM CHLORIDE 1000 ML: 9 INJECTION, SOLUTION INTRAVENOUS at 07:45

## 2018-10-03 RX ADMIN — Medication 0.1 MCG/KG/MIN: at 16:02

## 2018-10-03 RX ADMIN — PHENYLEPHRINE HYDROCHLORIDE 100 MCG: 10 INJECTION INTRAVENOUS at 18:26

## 2018-10-03 RX ADMIN — SODIUM CHLORIDE 8 MG/HR: 9 INJECTION, SOLUTION INTRAVENOUS at 11:09

## 2018-10-03 RX ADMIN — GELATIN ABSORBABLE SPONGE SIZE 100 1 EACH: MISC at 21:50

## 2018-10-03 RX ADMIN — LIDOCAINE HYDROCHLORIDE 8 ML: 10 INJECTION, SOLUTION EPIDURAL; INFILTRATION; INTRACAUDAL; PERINEURAL at 22:41

## 2018-10-03 RX ADMIN — SODIUM CHLORIDE: 9 INJECTION, SOLUTION INTRAVENOUS at 18:47

## 2018-10-03 RX ADMIN — VASOPRESSIN 0.02 UNITS/MIN: 20 INJECTION INTRAVENOUS at 16:45

## 2018-10-03 RX ADMIN — VASOPRESSIN 0.02 UNITS/MIN: 20 INJECTION INTRAVENOUS at 23:24

## 2018-10-03 RX ADMIN — Medication 0.27 MCG/KG/MIN: at 19:17

## 2018-10-03 RX ADMIN — SODIUM BICARBONATE 2.4 MEQ: 0.2 INJECTION, SOLUTION INTRAVENOUS at 20:00

## 2018-10-03 RX ADMIN — IOPAMIDOL 250 ML: 755 INJECTION, SOLUTION INTRAVENOUS at 20:30

## 2018-10-03 RX ADMIN — SODIUM CHLORIDE 80 MG: 900 INJECTION INTRAVENOUS at 05:50

## 2018-10-03 RX ADMIN — SODIUM CHLORIDE: 9 INJECTION, SOLUTION INTRAVENOUS at 20:30

## 2018-10-03 RX ADMIN — SODIUM CHLORIDE 1000 ML: 9 INJECTION, SOLUTION INTRAVENOUS at 05:11

## 2018-10-03 RX ADMIN — SODIUM CHLORIDE: 9 INJECTION, SOLUTION INTRAVENOUS at 07:59

## 2018-10-03 RX ADMIN — METRONIDAZOLE 500 MG: 500 INJECTION, SOLUTION INTRAVENOUS at 05:11

## 2018-10-03 RX ADMIN — HEPARIN SODIUM IN SODIUM CHLORIDE 30 ML/HR: 200 INJECTION INTRAVENOUS at 20:00

## 2018-10-03 RX ADMIN — SODIUM CHLORIDE 250 ML: 9 INJECTION, SOLUTION INTRAVENOUS at 16:55

## 2018-10-03 RX ADMIN — NOREPINEPHRINE BITARTRATE 8 MCG/MIN: 1 INJECTION INTRAVENOUS at 08:00

## 2018-10-03 RX ADMIN — PHENYLEPHRINE HYDROCHLORIDE 100 MCG: 10 INJECTION INTRAVENOUS at 18:24

## 2018-10-03 RX ADMIN — SODIUM CHLORIDE 1000 ML: 9 INJECTION, SOLUTION INTRAVENOUS at 03:48

## 2018-10-03 RX ADMIN — SODIUM CHLORIDE 1000 ML: 9 INJECTION, SOLUTION INTRAVENOUS at 08:38

## 2018-10-03 RX ADMIN — IOPAMIDOL 75 ML: 755 INJECTION, SOLUTION INTRAVENOUS at 14:32

## 2018-10-03 RX ADMIN — PANTOPRAZOLE SODIUM 40 MG: 40 INJECTION, POWDER, FOR SOLUTION INTRAVENOUS at 08:08

## 2018-10-03 ASSESSMENT — PAIN SCALES - GENERAL
PAINLEVEL_OUTOF10: 6
PAINLEVEL_OUTOF10: 0
PAINLEVEL_OUTOF10: 5
PAINLEVEL_OUTOF10: 4

## 2018-10-03 ASSESSMENT — PAIN DESCRIPTION - LOCATION
LOCATION: GROIN
LOCATION: ABDOMEN
LOCATION: ABDOMEN

## 2018-10-03 ASSESSMENT — PAIN DESCRIPTION - PAIN TYPE
TYPE: ACUTE PAIN
TYPE: ACUTE PAIN

## 2018-10-03 ASSESSMENT — PAIN DESCRIPTION - ORIENTATION
ORIENTATION: LOWER
ORIENTATION: RIGHT

## 2018-10-03 ASSESSMENT — ENCOUNTER SYMPTOMS: SHORTNESS OF BREATH: 1

## 2018-10-03 ASSESSMENT — PAIN DESCRIPTION - PROGRESSION: CLINICAL_PROGRESSION: NOT CHANGED

## 2018-10-03 ASSESSMENT — PAIN DESCRIPTION - DESCRIPTORS
DESCRIPTORS: DISCOMFORT
DESCRIPTORS: CRAMPING

## 2018-10-03 ASSESSMENT — PAIN DESCRIPTION - FREQUENCY: FREQUENCY: INTERMITTENT

## 2018-10-03 NOTE — CONSULTS
PRN  ondansetron (ZOFRAN) injection 4 mg, 4 mg, Intravenous, Q6H PRN  0.9 % sodium chloride infusion, , Intravenous, Continuous  norepinephrine (LEVOPHED) 16 mg in dextrose 5% 250 mL infusion, 2 mcg/min, Intravenous, Continuous  vasopressin 20 Units in dextrose 5 % 100 mL infusion, 0.02 Units/min, Intravenous, Continuous  pantoprazole (PROTONIX) 80 mg in sodium chloride 0.9 % 100 mL infusion, 8 mg/hr, Intravenous, Continuous  0.9 % sodium chloride bolus, 250 mL, Intravenous, Once  sodium chloride flush 0.9 % injection 10 mL, 10 mL, Intravenous, PRN  0.9 % sodium chloride bolus, 1,000 mL, Intravenous, Once  Prior to Admission medications    Medication Sig Start Date End Date Taking? Authorizing Provider   oxyCODONE-acetaminophen (PERCOCET) 5-325 MG per tablet Take 1 tablet by mouth every 6 hours as needed for Pain. Emile Louis Historical Provider, MD   atorvastatin (LIPITOR) 40 MG tablet Take 1 tablet by mouth nightly 9/17/18   Miah Frost MD   lisinopril (PRINIVIL;ZESTRIL) 5 MG tablet Take 1 tablet by mouth daily  Patient taking differently: Take 10 mg by mouth daily  9/17/18   Miah Frost MD   clopidogrel (PLAVIX) 75 MG tablet TAKE ONE TABLET BY MOUTH DAILY 8/27/18   Miah Frost MD   lamoTRIgine (LAMICTAL) 25 MG tablet Take 3 tablets PO daily. Follow up with MD for further titrations.   Patient taking differently: Take 100 mg by mouth daily  8/24/18   ANA Liu CNP   metoprolol tartrate (LOPRESSOR) 50 MG tablet Take 1 tablet by mouth 2 times daily 8/24/18   ANA Liu CNP   docusate sodium (COLACE, DULCOLAX) 100 MG CAPS Take 100 mg by mouth 2 times daily 8/24/18   ANA Meier CNP   paliperidone Palmitate (INVEGA TRINZA) 819 MG/2.625ML SUSP IM injection Inject 819 mg into the muscle every 3 months    Historical Provider, MD   alogliptin-metformin (KAZANO) 12.5-1000 MG TABS Take 1 capsule by mouth 2 times daily    Historical Provider, MD   insulin aspart

## 2018-10-03 NOTE — ED PROVIDER NOTES
Vladimir Ledesma, APRN - CNP   paliperidone Palmitate (INVEGA TRINZA) 819 MG/2.625ML SUSP IM injection Inject 819 mg into the muscle every 3 months   Yes Historical Provider, MD   alogliptin-metformin (KAZANO) 12.5-1000 MG TABS Take 1 capsule by mouth 2 times daily   Yes Historical Provider, MD   insulin aspart (NOVOLOG) 100 UNIT/ML injection vial Inject 8 Units into the skin 3 times daily (before meals) For high blood sugar as needed   Yes Historical Provider, MD   levothyroxine (SYNTHROID) 150 MCG tablet Take 150 mcg by mouth nightly   Yes Historical Provider, MD   b complex vitamins capsule Take 1 capsule by mouth 2 times daily   Yes Historical Provider, MD   insulin glargine (BASAGLAR KWIKPEN) 100 UNIT/ML injection pen Inject 14 Units into the skin every morning    Yes Historical Provider, MD   aspirin 81 MG EC tablet Take 1 tablet by mouth daily 3/23/18  Yes Theresa Brown MD   folic acid (FOLVITE) 1 MG tablet Take 1 mg by mouth daily   Yes Historical Provider, MD       Allergies as of 10/03/2018 - Review Complete 10/03/2018   Allergen Reaction Noted    Dye [iodides]  04/23/2011       Past Medical History:   Diagnosis Date    B[facility]     CAD (coronary artery disease) 04/2018     of mLAD    Depression     Diabetes mellitus ([facility]     Hashimoto disease     Hyperlipidemia     Hypertension     Lithium intoxication 06/30/2018    required one time dialysis treatment for toxicity    Schizophrenia ([facility]     Sleep apnea         Surgical History:   Past Surgical History:   Procedure Laterality Date    CARDIAC CATHETERIZATION  03/22/2018    100% occlusion of the mLAD    CORONARY ANGIOPLASTY  05/29/2018    POBA of LAD    [place] CABG, ARTERIAL, THREE N/A 8/20/2018    CORONARY ARTERY BYPASS GRAFTING X3, INTERNAL MAMMARY ARTERY, SAPHENOUS VEIN GRAFT, ON PUMP performed by Sneha Ordaz MD at [address] [place] [address] History:  History reviewed. No pertinent family history.     Social History

## 2018-10-03 NOTE — CONSULTS
P Pulmonary and Critical Care   Consult Note      Reason for Consult: Acute lower GI bleed, anemia, shock   Requesting Physician: Dr. Jones Lasso:   Georgie Izquierdo / HPI:                The patient is a 53 y.o. male with significant past medical history of:      Diagnosis Date    Bipolar affective ([facility]     CAD (coronary artery disease) 04/2018     of mLAD    Depression     Diabetes mellitus ([facility]     Hashimoto disease     Hyperlipidemia     Hypertension     Lithium intoxication 06/30/2018    required one time dialysis treatment for toxicity    Schizophrenia ([facility]     Sleep apnea      The patient was transferred from [facility] earlier today with lower GI bleed. Been passing copious amounts of bright red blood per rectum. He was evaluated by GI and surgery in [place] and possible IR embolization was considered. For this reason the patient was transferred here. CTA in our emergency room did not reveal active bleeding and iron felt embolization at this point could not be performed. The patient continues to pass some bright red blood and clots per rectum and has been hemodynamically stable. His bleeding is complicated by the fact that he has been on aspirin and Plavix since CABG in August.  He is on norepinephrine and vasopressin at the moment. He has also been seen by GI and surgery here today.        Past Surgical History:        Procedure Laterality Date    CARDIAC CATHETERIZATION  03/22/2018    100% occlusion of the mLAD    CORONARY ANGIOPLASTY  05/29/2018    POBA of LAD    UT CABG, ARTERIAL, THREE N/A 8/20/2018    CORONARY ARTERY BYPASS GRAFTING X3, INTERNAL MAMMARY ARTERY, SAPHENOUS VEIN GRAFT, ON PUMP performed by Rafa Khan MD at [facility]     Current Medications:    Current Facility-Administered Medications: atorvastatin (LIPITOR) tablet 40 mg, 40 mg, Oral, Nightly  [START ON 10/4/2018] lamoTRIgine (LAMICTAL) tablet 100 mg, 100 mg, Oral, Daily  levothyroxine (SYNTHROID) tablet 150 mcg, 150 mcg, Oral, Nightly  sodium chloride flush 0.9 % injection 10 mL, 10 mL, Intravenous, 2 times per day  sodium chloride flush 0.9 % injection 10 mL, 10 mL, Intravenous, PRN  acetaminophen (TYLENOL) tablet 650 mg, 650 mg, Oral, Q4H PRN  ondansetron (ZOFRAN) injection 4 mg, 4 mg, Intravenous, Q6H PRN  0.9 % sodium chloride infusion, , Intravenous, Continuous  norepinephrine (LEVOPHED) 16 mg in dextrose 5% 250 mL infusion, 2 mcg/min, Intravenous, Continuous  vasopressin 20 Units in dextrose 5 % 100 mL infusion, 0.02 Units/min, Intravenous, Continuous  pantoprazole (PROTONIX) 80 mg in sodium chloride 0.9 % 100 mL infusion, 8 mg/hr, Intravenous, Continuous  0.9 % sodium chloride bolus, 250 mL, Intravenous, Once  sodium chloride flush 0.9 % injection 10 mL, 10 mL, Intravenous, PRN  0.9 % sodium chloride bolus, 1,000 mL, Intravenous, Once    Allergies   Allergen Reactions    Dye [Iodides]      States feel drunk afterwards-last 8 hours       Social History:    TOBACCO:   reports that he has quit smoking. His smoking use included Cigars. He has never used smokeless tobacco.  ETOH:   reports that he does not drink alcohol. Patient currently lives independently  Environmental/chemical exposure: None known     Family History:   No family history on file. REVIEW OF SYSTEMS:    CONSTITUTIONAL:  negative for fevers, chills, diaphoresis, activity change, appetite change, fatigue, night sweats and unexpected weight change.    EYES:  negative for blurred vision, eye discharge, visual disturbance and icterus  HEENT:  negative for hearing loss, tinnitus, ear drainage, sinus pressure, nasal congestion, epistaxis and snoring  RESPIRATORY:  See HPI  CARDIOVASCULAR:  negative for chest pain, palpitations, exertional chest pressure/discomfort, edema, syncope  GASTROINTESTINAL:  negative for nausea, vomiting, diarrhea, constipation, blood in stool and abdominal pain  GENITOURINARY:  negative for frequency, dysuria, urinary incontinence, decreased urine volume, and hematuria  HEMATOLOGIC/LYMPHATIC:  negative for easy bruising, bleeding and lymphadenopathy  ALLERGIC/IMMUNOLOGIC:  negative for recurrent infections, angioedema, anaphylaxis and drug reactions  ENDOCRINE:  negative for weight changes and diabetic symptoms including polyuria, polydipsia and polyphagia  MUSCULOSKELETAL:  negative for  pain, joint swelling, decreased range of motion and muscle weakness  NEUROLOGICAL:  negative for headaches, slurred speech, unilateral weakness  PSYCHIATRIC/BEHAVIORAL: negative for hallucinations, behavioral problems, confusion and agitation. Objective:   PHYSICAL EXAM:      VITALS:  /78   Pulse 110   Resp 23   Ht 6' (1.829 m)   Wt 192 lb 3.9 oz (87.2 kg)   SpO2 100%   BMI 26.07 kg/m²      24HR INTAKE/OUTPUT:    Intake/Output Summary (Last 24 hours) at 10/03/18 1645  Last data filed at 10/03/18 1635   Gross per 24 hour   Intake                0 ml   Output              300 ml   Net             -300 ml     CONSTITUTIONAL:  His complexion is pale and he feels uncomfortable  NECK:  Supple, symmetrical, trachea midline, no adenopathy, thyroid symmetric, not enlarged and no tenderness, skin normal  LUNGS:  no increased work of breathing and clear to auscultation. No accessory muscle use  CARDIOVASCULAR: S1 and S2, no edema and no JVD  ABDOMEN:  Soft but mildly tender in the lower quadrant  LYMPHADENOPATHY:  no axillary or supraclavicular adenopathy. No cervical adnenopathy  PSYCHIATRIC: Oriented to person place and time. No obvious depression or anxiety. MUSCULOSKELETAL: No obvious misalignment or effusion of the joints. No clubbing, cyanosis of the digits. SKIN:  normal skin color, texture, turgor and no redness, warmth, or swelling.  No palpable nodules    DATA:    Old records have been reviewed    CBC:  Recent Labs      10/03/18   0344  10/03/18   0613  10/03/18   0925  10/03/18   1550   WBC  13.0*  9.4   --

## 2018-10-03 NOTE — PROGRESS NOTES
8th unit PRBC transfusing. BP 99/56 MAP 69  Patient status unchanged.    SR on monitor  Report to DEVONTE RAMIREZ [facility] Radiology

## 2018-10-03 NOTE — CONSULTS
weight 192 lb 3.9 oz (87.2 kg), SpO2 100 %. General appearance: alert, cooperative, pale, appears ill appears stated age  Eyes: Anicteric  Head: Normocephalic, without obvious abnormality  Lungs: clear to auscultation bilaterally, Normal Effort  Heart: regular rate and rhythm currently 92 HR  On exam, normal S1 and S2, no  rubs  Abdomen: soft, non-tender currently. Bowel sounds normal. No masses,  no organomegaly. Extremities: atraumatic, no cyanosis or edema  Skin: warm and dry, no jaundice + palor  Neuro: Grossly intact, A&OX3  4/5  bilat  No edema  Cool extremities   Mood ill, affect congruent  No rash noted. BRB in bedpan       Data Review:    Recent Labs      10/03/18   0344  10/03/18   0613  10/03/18   0925  10/03/18   1550   WBC  13.0*  9.4   --   8.9   HGB  9.8*  6.9*  8.2*  7.7*   HCT  30.5*  21.5*  25.2*  23.3*   MCV  85.6  86.3   --   86.6   PLT  301  223   --   217     Recent Labs      10/03/18   0344  10/03/18   1210   NA  143  140   K  4.5  5.0   CL  106  115*   CO2  23  19*   BUN  18  20   CREATININE  1.0  0.9     Recent Labs      10/03/18   0344   AST  16   ALT  9*   BILITOT  0.3   ALKPHOS  63     No results for input(s): LIPASE, AMYLASE in the last 72 hours. Recent Labs      10/03/18   0344  10/03/18   1210   PROTIME  13.8*  15.7*   INR  1.22*  1.38*                        Assessment:   1. Hematochezia- ? Inflammation in right colon near hepatic flexure. But this is more significant bleeding than would be seen with colitis. Could have diverticulitis but not common to have this with bleeding. Certainly diverticular bleeding is in ddx along with PUD among others  2. Hypotension due to GI bleed- improved with volume. On a very small dose of pressors still. HR is now in 90s. 3. CAD s/p CABG now on ASA and Plavix    Recommendations:   1. NG lavage,   2. PRBC support, IVF resuscitation, try to wean pressors   3. Monitor vitals and clinical status.   Will plan for colonoscopy after bowel

## 2018-10-03 NOTE — PROGRESS NOTES
CVC placed using sterile procedure per Dr. Malik Quintero. All lines flush with blood return. CXR completed   PRBC completed. 3rd unit transfusing now.

## 2018-10-03 NOTE — PROCEDURES
Procedure: central venous access, R IJ    Indication: invasive hemodynamic monitoring, frequent blood draws, ensure stable IV access    Informed Consent: was obtained from family     Time Out: taken    Procedure: Sterile prep with chlorhexidine. Full maximum sterile field/barrier technique was followed (with cap and mask and sterile gown and large sterile sheet and hand hygeine and 2% chlorhexidine). Aqueous lidocaine anesthetic. Direct ultrasound visualization of the right internal jugular vein, with placement of central venous catheter using modified seldinger technique. Good dark venous blood from all 3 lumens. CXR pending. No immediate complication.     Recommendation: remove central line at earliest time feasible to mitigate infectious risks

## 2018-10-03 NOTE — PROGRESS NOTES
Pt transferred from  [facility] ED to ICU room 11 for GI bleed. Arrives on 2L NC. Current VS:  93 HR, 99 SPO2%, 90/54  BP, RR 14, 97.7 temp (oral). Pt has #18 L FA PIV in place that has blood infusing and #18 placed at bedside by Andressa Bowman RN. Pt is lethargic, able to respond to verbal stimuli . C/o needing to have a BM - bedpan provided. Breath sounds with clear. Yessica TODD and PEREZ Wade performed complete skin assessment on admission. Assessment revealed pale cool, dry, intact skin. Bed in low position. Radial pulses weak. Call light in reach. Door open. ICU monitoring in place.  Will continue to monitor closely    Eddy Levin RN on 10/3/18 at 7:24 AM    Consult has been called to GI by Faye Hall RN - callback pending

## 2018-10-03 NOTE — PROGRESS NOTES
Dr. Elaina Alford updated on status. Preparing patient for transfer out. Awaiting facility and bed. Patient oriented x3.  BP 97/53  MAP 67  Vaso at 0.04units/hr  Levophed 16 mcg/min  6 unit of PRBC

## 2018-10-03 NOTE — H&P
Past Surgical History:   has a past surgical history that includes Coronary angioplasty (05/29/2018); Cardiac catheterization (03/22/2018); and pr cabg, arterial, three (N/A, 8/20/2018). Medications:  No current facility-administered medications on file prior to encounter. Current Outpatient Prescriptions on File Prior to Encounter   Medication Sig Dispense Refill    oxyCODONE-acetaminophen (PERCOCET) 5-325 MG per tablet Take 1 tablet by mouth every 6 hours as needed for Pain. Adrianna Broome atorvastatin (LIPITOR) 40 MG tablet Take 1 tablet by mouth nightly 30 tablet 6    lisinopril (PRINIVIL;ZESTRIL) 5 MG tablet Take 1 tablet by mouth daily (Patient taking differently: Take 10 mg by mouth daily ) 30 tablet 5    clopidogrel (PLAVIX) 75 MG tablet TAKE ONE TABLET BY MOUTH DAILY 90 tablet 0    lamoTRIgine (LAMICTAL) 25 MG tablet Take 3 tablets PO daily. Follow up with MD for further titrations.  (Patient taking differently: Take 100 mg by mouth daily ) 15 tablet 0    metoprolol tartrate (LOPRESSOR) 50 MG tablet Take 1 tablet by mouth 2 times daily 60 tablet 3    docusate sodium (COLACE, DULCOLAX) 100 MG CAPS Take 100 mg by mouth 2 times daily      paliperidone Palmitate (INVEGA TRINZA) 819 MG/2.625ML SUSP IM injection Inject 819 mg into the muscle every 3 months      alogliptin-metformin (KAZANO) 12.5-1000 MG TABS Take 1 capsule by mouth 2 times daily      insulin aspart (NOVOLOG) 100 UNIT/ML injection vial Inject 8 Units into the skin 3 times daily (before meals) For high blood sugar as needed      levothyroxine (SYNTHROID) 150 MCG tablet Take 150 mcg by mouth nightly      b complex vitamins capsule Take 1 capsule by mouth 2 times daily      insulin glargine (BASAGLAR KWIKPEN) 100 UNIT/ML injection pen Inject 14 Units into the skin every morning       aspirin 81 MG EC tablet Take 1 tablet by mouth daily 30 tablet 3    folic acid (FOLVITE) 1 MG tablet Take 1 mg by mouth daily Allergies: Allergies   Allergen Reactions    Dye [Iodides]      States feel drunk afterwards-last 8 hours        Social History:   reports that he has quit smoking. His smoking use included Cigars. He has never used smokeless tobacco. He reports that he does not drink alcohol or use drugs. Family History:  Brother- HTN    Physical Exam:  BP (!) 93/51   Pulse 94   Temp 99 °F (37.2 °C) (Temporal)   Resp 9   Ht 6' (1.829 m)   Wt 192 lb 3.9 oz (87.2 kg)   SpO2 100%   BMI 26.07 kg/m²     General appearance:  Appears comfortable. Well nourished  Eyes: Sclera clear, pupils equal  ENT: Moist mucus membranes, no thrush. Trachea midline. Cardiovascular: Regular rhythm, normal S1, S2. No murmur, gallop, rub. No edema in lower extremities  Respiratory: Clear to auscultation bilaterally, no wheeze, good inspiratory effort  Gastrointestinal: Abdomen soft, non-tender, not distended, normal bowel sounds  Musculoskeletal: No cyanosis in digits, neck supple  Neurology: Cranial nerves grossly intact. Alert and oriented in time, place and person. No speech or motor deficits  Psychiatry: Appropriate affect.  Not agitated  Skin: Warm, dry, normal turgor, no rash    Labs:  CBC:   Lab Results   Component Value Date    WBC 8.9 10/03/2018    RBC 2.69 10/03/2018    HGB 7.7 10/03/2018    HCT 23.3 10/03/2018    MCV 86.6 10/03/2018    MCH 28.8 10/03/2018    MCHC 33.2 10/03/2018    RDW 16.0 10/03/2018     10/03/2018    MPV 8.4 10/03/2018     BMP:    Lab Results   Component Value Date     10/03/2018    K 4.8 10/03/2018    K 5.0 10/03/2018     10/03/2018    CO2 19 10/03/2018    BUN 20 10/03/2018    CREATININE 0.9 10/03/2018    CALCIUM 8.2 10/03/2018    GFRAA >60 10/03/2018    GFRAA >60 01/15/2012    LABGLOM >60 10/03/2018    GLUCOSE 219 10/03/2018       Chest Xray:   EKG:        Problem List  Active Problems:    GI bleed    Hemorrhagic shock (HCC)    Coagulopathy (HCC)  Resolved Problems:    * No resolved

## 2018-10-03 NOTE — PROGRESS NOTES
Pt to ICU bed 17 at this time. Pt has vesopressin at 0.04 unit/min and 0.2 of levophed infusion current bp 114/78 map 86. HR 84  Levo titrated to 0.1 mcg/kg/min for map of 86 at 1550   Pt alert and oriented x 4, pale. Pt has bright red bleeding with many large clots from rectum.

## 2018-10-03 NOTE — PROGRESS NOTES
0830 - Admission assessment completed at this time. Pt and caregiver refused influenza vaccination due to wanting clearance from cardiologist, apt scheduled for 10/10/18. Pt also has a living will and MPOA but currently do not have a copy with them.  Will bring in when they can

## 2018-10-03 NOTE — PROGRESS NOTES
Dr. Dorina Mcintosh called stated possible small pneumothorax on cxr, verbal order to repeat cxr. Dr Wen Ngo made aware.

## 2018-10-03 NOTE — CONSULTS
benign. Neck: supple, symmetrical, trachea midline and thyroid not enlarged, symmetric, no tenderness/mass/nodules  Lungs: clear to auscultation bilaterally  Heart: regular rate and rhythm, S1, S2 normal, no murmur, click, rub or gallop  Abdomen: soft, non-tender; bowel sounds normal; no masses,  no organomegaly  Extremities: extremities normal, atraumatic, no cyanosis or edema  Skin: appears pale, no rashes or lesions     Neurologic: Grossly normal    Lab and Imaging Review   Labs:  CBC:   Recent Labs      10/03/18   0344  10/03/18   0613  10/03/18   0925   WBC  13.0*  9.4   --    HGB  9.8*  6.9*  8.2*   HCT  30.5*  21.5*  25.2*   MCV  85.6  86.3   --    PLT  301  223   --      BMP:   Recent Labs      10/03/18   0344  10/03/18   1210   NA  143  140   K  4.5  5.0   CL  106  115*   CO2  23  19*   BUN  18  20   CREATININE  1.0  0.9     LIVER PROFILE:   Recent Labs      10/03/18   0344   AST  16   ALT  9*   PROT  6.8   BILITOT  0.3   ALKPHOS  63     PT/INR:   Recent Labs      10/03/18   0344  10/03/18   1210   INR  1.22*  1.38*     Attending Supervising [de-identified] Attestation Statement  The patient is a 53 y.o. male. I have performed a history and physical examination of the patient. I discussed the case with my physician assistant Luz Nascimento. I reviewed the patient's Past Medical History, Past Surgical History, Medications, and Allergies.      Physical Exam:  Vitals:    10/03/18 1235 10/03/18 1255 10/03/18 1300 10/03/18 1319   BP: 95/64 98/70 98/70 112/67   Pulse: 58  73 82   Resp: 11  14 16   Temp: 97.6 °F (36.4 °C) 97.6 °F (36.4 °C)  97.8 °F (36.6 °C)   TempSrc:  Oral  Axillary   SpO2:   100%    Weight:       Height:           Physical Examination: General appearance - ill-appearing and pale  Mental status - alert, oriented to person, place, and time  Eyes - pupils equal and reactive, extraocular eye movements intact  Neck - supple, no significant adenopathy  Chest - clear to auscultation, no wheezes, rales or rhonchi, symmetric air entry  Heart - normal rate, regular rhythm, normal S1, S2, no murmurs, rubs, clicks or gallops  Abdomen - soft, nontender, nondistended, no masses or organomegaly  Extremities - peripheral pulses normal, no pedal edema, no clubbing or cyanosis  Skin - normal coloration and turgor, no rashes, no suspicious skin lesions noted        Assessment:     53year old male with history of HTN HLP, diabetes, CAD, bipolar and chronic anemia admitted with massive GI hemorrhage, suspicious for arterial hemorrhage. Plan:   Discussed the case with Dr. Shadi Lindsay. A CT angiogram would be an option but due to hypotension and recent contrast load, it was deferred  Tagged RBC scan was ordered  Surgery was consulted  Upon further discussion with Dr. Socorro Shaw, it was decided to transfer patient to [facility] for mesenteric angiogram urgently    I reviewed and agree with the findings and plan documented in her note with the appropriate changes made to it.     Electronically signed by Ciara Burt MD on 10/3/18 at 3:41 PM          (O) [phone]  [phone]

## 2018-10-04 ENCOUNTER — APPOINTMENT (OUTPATIENT)
Dept: GENERAL RADIOLOGY | Age: 48
DRG: 356 | End: 2018-10-04
Attending: HOSPITALIST
Payer: MEDICARE

## 2018-10-04 LAB
A/G RATIO: 1.3 (ref 1.1–2.2)
ALBUMIN SERPL-MCNC: 2.1 G/DL (ref 3.4–5)
ALP BLD-CCNC: 33 U/L (ref 40–129)
ALT SERPL-CCNC: 7 U/L (ref 10–40)
ANION GAP SERPL CALCULATED.3IONS-SCNC: 11 MMOL/L (ref 3–16)
AST SERPL-CCNC: 10 U/L (ref 15–37)
BASOPHILS ABSOLUTE: 0 K/UL (ref 0–0.2)
BASOPHILS RELATIVE PERCENT: 0.4 %
BILIRUB SERPL-MCNC: 0.5 MG/DL (ref 0–1)
BLOOD BANK DISPENSE STATUS: NORMAL
BLOOD BANK PRODUCT CODE: NORMAL
BPU ID: NORMAL
BUN BLDV-MCNC: 16 MG/DL (ref 7–20)
CALCIUM SERPL-MCNC: 7.8 MG/DL (ref 8.3–10.6)
CHLORIDE BLD-SCNC: 112 MMOL/L (ref 99–110)
CO2: 16 MMOL/L (ref 21–32)
CREAT SERPL-MCNC: 0.9 MG/DL (ref 0.9–1.3)
DESCRIPTION BLOOD BANK: NORMAL
EOSINOPHILS ABSOLUTE: 0 K/UL (ref 0–0.6)
EOSINOPHILS RELATIVE PERCENT: 0 %
ESTIMATED AVERAGE GLUCOSE: 108.3 MG/DL
GFR AFRICAN AMERICAN: >60
GFR NON-AFRICAN AMERICAN: >60
GLOBULIN: 1.6 G/DL
GLUCOSE BLD-MCNC: 148 MG/DL (ref 70–99)
GLUCOSE BLD-MCNC: 212 MG/DL (ref 70–99)
GLUCOSE BLD-MCNC: 250 MG/DL (ref 70–99)
HBA1C MFR BLD: 5.4 %
HCT VFR BLD CALC: 20.5 % (ref 40.5–52.5)
HCT VFR BLD CALC: 20.7 % (ref 40.5–52.5)
HCT VFR BLD CALC: 21.1 % (ref 40.5–52.5)
HEMOGLOBIN: 6.9 G/DL (ref 13.5–17.5)
HEMOGLOBIN: 6.9 G/DL (ref 13.5–17.5)
HEMOGLOBIN: 7.1 G/DL (ref 13.5–17.5)
LYMPHOCYTES ABSOLUTE: 1.5 K/UL (ref 1–5.1)
LYMPHOCYTES RELATIVE PERCENT: 16.8 %
MAGNESIUM: 1.8 MG/DL (ref 1.8–2.4)
MCH RBC QN AUTO: 29 PG (ref 26–34)
MCHC RBC AUTO-ENTMCNC: 34 G/DL (ref 31–36)
MCV RBC AUTO: 85.3 FL (ref 80–100)
MONOCYTES ABSOLUTE: 0.7 K/UL (ref 0–1.3)
MONOCYTES RELATIVE PERCENT: 7.7 %
NEUTROPHILS ABSOLUTE: 6.9 K/UL (ref 1.7–7.7)
NEUTROPHILS RELATIVE PERCENT: 75.1 %
PDW BLD-RTO: 14.5 % (ref 12.4–15.4)
PERFORMED ON: ABNORMAL
PERFORMED ON: ABNORMAL
PLATELET # BLD: 214 K/UL (ref 135–450)
PMV BLD AUTO: 7.9 FL (ref 5–10.5)
POTASSIUM SERPL-SCNC: 4.4 MMOL/L (ref 3.5–5.1)
RBC # BLD: 2.4 M/UL (ref 4.2–5.9)
SODIUM BLD-SCNC: 139 MMOL/L (ref 136–145)
TOTAL PROTEIN: 3.7 G/DL (ref 6.4–8.2)
WBC # BLD: 9.2 K/UL (ref 4–11)

## 2018-10-04 PROCEDURE — 85018 HEMOGLOBIN: CPT

## 2018-10-04 PROCEDURE — 36592 COLLECT BLOOD FROM PICC: CPT

## 2018-10-04 PROCEDURE — 2580000003 HC RX 258: Performed by: HOSPITALIST

## 2018-10-04 PROCEDURE — 85014 HEMATOCRIT: CPT

## 2018-10-04 PROCEDURE — 83735 ASSAY OF MAGNESIUM: CPT

## 2018-10-04 PROCEDURE — 99232 SBSQ HOSP IP/OBS MODERATE 35: CPT | Performed by: SURGERY

## 2018-10-04 PROCEDURE — 2580000003 HC RX 258: Performed by: NURSE PRACTITIONER

## 2018-10-04 PROCEDURE — P9016 RBC LEUKOCYTES REDUCED: HCPCS

## 2018-10-04 PROCEDURE — 6360000002 HC RX W HCPCS: Performed by: NURSE PRACTITIONER

## 2018-10-04 PROCEDURE — 2000000000 HC ICU R&B

## 2018-10-04 PROCEDURE — 71045 X-RAY EXAM CHEST 1 VIEW: CPT

## 2018-10-04 PROCEDURE — 99291 CRITICAL CARE FIRST HOUR: CPT | Performed by: INTERNAL MEDICINE

## 2018-10-04 PROCEDURE — C9113 INJ PANTOPRAZOLE SODIUM, VIA: HCPCS | Performed by: INTERNAL MEDICINE

## 2018-10-04 PROCEDURE — 85025 COMPLETE CBC W/AUTO DIFF WBC: CPT

## 2018-10-04 PROCEDURE — 2500000003 HC RX 250 WO HCPCS: Performed by: NURSE PRACTITIONER

## 2018-10-04 PROCEDURE — 6370000000 HC RX 637 (ALT 250 FOR IP): Performed by: HOSPITALIST

## 2018-10-04 PROCEDURE — 2580000003 HC RX 258: Performed by: INTERNAL MEDICINE

## 2018-10-04 PROCEDURE — 6360000002 HC RX W HCPCS: Performed by: INTERNAL MEDICINE

## 2018-10-04 PROCEDURE — 80053 COMPREHEN METABOLIC PANEL: CPT

## 2018-10-04 PROCEDURE — 2500000003 HC RX 250 WO HCPCS: Performed by: INTERNAL MEDICINE

## 2018-10-04 PROCEDURE — 36430 TRANSFUSION BLD/BLD COMPNT: CPT

## 2018-10-04 PROCEDURE — 6370000000 HC RX 637 (ALT 250 FOR IP): Performed by: NURSE PRACTITIONER

## 2018-10-04 RX ORDER — FUROSEMIDE 10 MG/ML
20 INJECTION INTRAMUSCULAR; INTRAVENOUS ONCE
Status: COMPLETED | OUTPATIENT
Start: 2018-10-04 | End: 2018-10-04

## 2018-10-04 RX ORDER — NICOTINE POLACRILEX 4 MG
15 LOZENGE BUCCAL PRN
Status: DISCONTINUED | OUTPATIENT
Start: 2018-10-04 | End: 2018-10-08 | Stop reason: HOSPADM

## 2018-10-04 RX ORDER — DEXTROSE MONOHYDRATE 50 MG/ML
100 INJECTION, SOLUTION INTRAVENOUS PRN
Status: DISCONTINUED | OUTPATIENT
Start: 2018-10-04 | End: 2018-10-08 | Stop reason: HOSPADM

## 2018-10-04 RX ORDER — MAGNESIUM SULFATE IN WATER 40 MG/ML
2 INJECTION, SOLUTION INTRAVENOUS ONCE
Status: COMPLETED | OUTPATIENT
Start: 2018-10-04 | End: 2018-10-04

## 2018-10-04 RX ORDER — 0.9 % SODIUM CHLORIDE 0.9 %
250 INTRAVENOUS SOLUTION INTRAVENOUS ONCE
Status: COMPLETED | OUTPATIENT
Start: 2018-10-04 | End: 2018-10-05

## 2018-10-04 RX ORDER — 0.9 % SODIUM CHLORIDE 0.9 %
250 INTRAVENOUS SOLUTION INTRAVENOUS ONCE
Status: COMPLETED | OUTPATIENT
Start: 2018-10-04 | End: 2018-10-04

## 2018-10-04 RX ORDER — DEXTROSE MONOHYDRATE 25 G/50ML
12.5 INJECTION, SOLUTION INTRAVENOUS PRN
Status: DISCONTINUED | OUTPATIENT
Start: 2018-10-04 | End: 2018-10-08 | Stop reason: HOSPADM

## 2018-10-04 RX ADMIN — MAGNESIUM SULFATE HEPTAHYDRATE 2 G: 40 INJECTION, SOLUTION INTRAVENOUS at 01:58

## 2018-10-04 RX ADMIN — CALCIUM GLUCONATE 1 G: 98 INJECTION, SOLUTION INTRAVENOUS at 00:55

## 2018-10-04 RX ADMIN — ACETAMINOPHEN 650 MG: 325 TABLET, FILM COATED ORAL at 12:26

## 2018-10-04 RX ADMIN — SODIUM CHLORIDE: 9 INJECTION, SOLUTION INTRAVENOUS at 16:32

## 2018-10-04 RX ADMIN — SODIUM CHLORIDE 250 ML: 9 INJECTION, SOLUTION INTRAVENOUS at 17:50

## 2018-10-04 RX ADMIN — Medication 0.35 MCG/KG/MIN: at 03:28

## 2018-10-04 RX ADMIN — Medication 10 ML: at 20:57

## 2018-10-04 RX ADMIN — Medication 10 ML: at 07:55

## 2018-10-04 RX ADMIN — DESMOPRESSIN ACETATE 40 MG: 0.2 TABLET ORAL at 20:56

## 2018-10-04 RX ADMIN — FUROSEMIDE 20 MG: 10 INJECTION, SOLUTION INTRAMUSCULAR; INTRAVENOUS at 13:43

## 2018-10-04 RX ADMIN — VASOPRESSIN 0.04 UNITS/MIN: 20 INJECTION INTRAVENOUS at 00:34

## 2018-10-04 RX ADMIN — INSULIN GLARGINE 14 UNITS: 100 INJECTION, SOLUTION SUBCUTANEOUS at 10:08

## 2018-10-04 RX ADMIN — PANTOPRAZOLE SODIUM 40 MG: 40 INJECTION, POWDER, FOR SOLUTION INTRAVENOUS at 07:55

## 2018-10-04 RX ADMIN — LAMOTRIGINE 100 MG: 100 TABLET ORAL at 07:56

## 2018-10-04 RX ADMIN — CALCIUM GLUCONATE 1 G: 98 INJECTION, SOLUTION INTRAVENOUS at 17:50

## 2018-10-04 RX ADMIN — SODIUM CHLORIDE: 9 INJECTION, SOLUTION INTRAVENOUS at 07:34

## 2018-10-04 RX ADMIN — SODIUM CHLORIDE: 9 INJECTION, SOLUTION INTRAVENOUS at 01:29

## 2018-10-04 RX ADMIN — SODIUM CHLORIDE 250 ML: 9 INJECTION, SOLUTION INTRAVENOUS at 11:36

## 2018-10-04 RX ADMIN — SODIUM CHLORIDE 250 ML: 9 INJECTION, SOLUTION INTRAVENOUS at 06:48

## 2018-10-04 RX ADMIN — LEVOTHYROXINE SODIUM 150 MCG: 25 TABLET ORAL at 20:56

## 2018-10-04 ASSESSMENT — PAIN SCALES - GENERAL
PAINLEVEL_OUTOF10: 0

## 2018-10-04 ASSESSMENT — PAIN SCALES - WONG BAKER
WONGBAKER_NUMERICALRESPONSE: 0
WONGBAKER_NUMERICALRESPONSE: 2

## 2018-10-04 NOTE — PROGRESS NOTES
1115- PRBC transfusion started by SHRUTI Pickard RN. Pt tolerating well. Will give Lasix after transfusion. 1220- Reassessment completed and unchanged. Pt remains a/o X 4. Lungs diminished. Continues to have facial/eye edema. Right femoral sheath remains intact with no complications. Weaning Levophed as tolerates. Repositioned in bed for comfort. PRN Tylenol given for fever- see MAR. Will continue to monitor. Mara Crawford RN, BSN, CCRN.

## 2018-10-04 NOTE — PROGRESS NOTES
Per new order transfusing one unit of PRBC. This RN will remain in room for first 15 minutes of infusion. No complications noted at this time.

## 2018-10-04 NOTE — PROGRESS NOTES
Pt having massive amounts of bloody stool, unmeasurable, large clots noted. Dr Lilia Jordan and dr Arana Proud notified. More prbc, platelets, and plasma ordered see orders.

## 2018-10-04 NOTE — PROGRESS NOTES
Patient assessment completed, no changes. Patient on RA tolerating well. Weaning levophed as tolerated. Patient has no bleeding from rectum at this time. Patient's sheath remains intact no signs of complications at this time. Pedal pulses remain strong. Morning labs collected via central line. Patient denies any needs at this time. Will continue to monitor.

## 2018-10-04 NOTE — PROGRESS NOTES
contrast extravasation. 3.  The vascular sheath within the right common femoral artery was sutured in   place at the request of the ordering service. XR CHEST PORTABLE    (Results Pending)       Discussed care with family      Problem List  Active Problems:    GI bleed    Hemorrhagic shock (HCC)    Coagulopathy ([address]  Resolved Problems:    * No resolved hospital problems.  *       Assessment & Plan:   Hypovolemic shock sec to acute blood loss anemia sec to acute diverticular hemorrhage s/p distal branch SMA coil and gelfoam embolization  S/p 16 units of prbc uptill now  Weaning off the levophed drip   Keep HCT above 24, given h/o CAD with recent CABG     CAD s/p CABG    IDDM    IV Access:IJ Rt side  Vela:  Diet: DIET CLEAR LIQUID;  Code:Full Code  DVT PPX SCDs  Disposition Unclear,       Cory Berumen MD   10/4/2018 9:49 PM

## 2018-10-04 NOTE — PROGRESS NOTES
Page to hospitalist as follows. Will await any new orders. Received panic from Lab for hemoglobin 6.9 and hematocrit 20.5. Please advise. Thanks!

## 2018-10-04 NOTE — PROGRESS NOTES
colonoscopy (none in past, grandparent had colon pathology) in 2 months when less risk colon ischemia.   - hold asa/plavix as massive gi blood loss/life threatening at admit, and had cabg but no reported stent; will need reassess risk/benefit of blood thinners over time with primary team.       Eboni Hernandez MD  [facility] and [facility]

## 2018-10-04 NOTE — PROGRESS NOTES
Pt weaned off Levophed. Will continue to monitor for hypotension. Continuing to monitor h/h q6h. Sheath remains intact. Fabio Perez  RN, BSN ,CCRN.

## 2018-10-04 NOTE — PROGRESS NOTES
9/17/18  Yes Maribel Aguilar MD   clopidogrel (PLAVIX) 75 MG tablet TAKE ONE TABLET BY MOUTH DAILY 8/27/18  Yes Maribel Aguilar MD   lamoTRIgine (LAMICTAL) 25 MG tablet Take 3 tablets PO daily. Follow up with MD for further titrations. Patient taking differently: Take 100 mg by mouth daily  8/24/18  Yes ANA Meier CNP   metoprolol tartrate (LOPRESSOR) 50 MG tablet Take 1 tablet by mouth 2 times daily 8/24/18  Yes ANA Meier CNP   docusate sodium (COLACE, DULCOLAX) 100 MG CAPS Take 100 mg by mouth 2 times daily 8/24/18  Yes ANA Meier CNP   paliperidone Palmitate (Shruti Hammans) 819 MG/2.625ML SUSP IM injection Inject 819 mg into the muscle every 3 months   Yes Historical Provider, MD   alogliptin-metformin (KAZANO) 12.5-1000 MG TABS Take 1 capsule by mouth 2 times daily   Yes Historical Provider, MD   insulin aspart (NOVOLOG) 100 UNIT/ML injection vial Inject 8 Units into the skin 3 times daily (before meals) For high blood sugar as needed   Yes Historical Provider, MD   levothyroxine (SYNTHROID) 150 MCG tablet Take 150 mcg by mouth nightly   Yes Historical Provider, MD   b complex vitamins capsule Take 1 capsule by mouth 2 times daily   Yes Historical Provider, MD   insulin glargine (BASAGLAR KWIKPEN) 100 UNIT/ML injection pen Inject 14 Units into the skin every morning    Yes Historical Provider, MD   aspirin 81 MG EC tablet Take 1 tablet by mouth daily 3/23/18  Yes Cynthia Lott MD   folic acid (FOLVITE) 1 MG tablet Take 1 mg by mouth daily   Yes Historical Provider, MD        Allergies:  Dye [iodides]    Social History:    reports that he has quit smoking. His smoking use included Cigars. He has never used smokeless tobacco. He reports that he does not drink alcohol or use drugs. Family History:    No family history on file.     REVIEW OF SYSTEMS:  CONSTITUTIONAL:  positive for  fatigue  HEENT:  negative  RESPIRATORY:  negative  CARDIOVASCULAR: negative  GASTROINTESTINAL:  negative  GENITOURINARY:  negative  HEMATOLOGIC/LYMPHATIC:  negative  NEUROLOGICAL:  negative  SKIN: negative      OBJECTIVE:  VITALS:  /64   Pulse 116   Temp 99.9 °F (37.7 °C) (Temporal)   Resp 12   Ht 6' (1.829 m)   Wt 192 lb 7.4 oz (87.3 kg)   SpO2 98%   BMI 26.10 kg/m²     INTAKE/OUTPUT:    I/O last 3 completed shifts: In: 6672 [P.O.:120; I.V.:4262; Blood:1121; NG/GT:30]  Out: 1162 [Urine:1525; Emesis/NG output:200; Blood:1400]  No intake/output data recorded. CONSTITUTIONAL:  fatigued but awakens  LUNGS:  clear to auscultation  HEART: reg, tacky  ABDOMEN:  normal bowel sounds, soft, non-distended, non-tender, no mass           Data:  CBC:   Recent Labs      10/03/18   1550  10/03/18   2252  10/04/18   0555   WBC  8.9  7.7  9.2   HGB  7.7*  7.5*  6.9*   HCT  23.3*  22.3*  20.5*   PLT  217  191  214     BMP:    Recent Labs      10/03/18   1210  10/03/18   1550  10/03/18   2252   NA  140  143  139   K  5.0  4.8  4.6   CL  115*  115*  115*   CO2  19*  19*  18*   BUN  20  20  16   CREATININE  0.9  0.9  0.9   GLUCOSE  214*  219*  207*     Hepatic:   Recent Labs      10/03/18   0344  10/03/18   1550   AST  16  9*   ALT  9*  8*   BILITOT  0.3  1.3*   ALKPHOS  63  42     Mag:      Recent Labs      10/03/18   2252  10/04/18   0555   MG  1.40*  1.80      Phos:     Recent Labs      10/03/18   2252   PHOS  3.4      INR:   Recent Labs      10/03/18   0344  10/03/18   1210   INR  1.22*  1.38*       Radiology Review:   Impression:     1.  Successful coil and Gelfoam embolization of a higher order branch of the  right colic artery which demonstrated active hemorrhage into a diverticula of  the hepatic flexure, as described above. 2.  Interrogation of the inferior mesenteric artery demonstrated no evidence  of active contrast extravasation. 3.  The vascular sheath within the right common femoral artery was sutured in  place at the request of the ordering service.

## 2018-10-05 ENCOUNTER — APPOINTMENT (OUTPATIENT)
Dept: GENERAL RADIOLOGY | Age: 48
DRG: 356 | End: 2018-10-05
Attending: HOSPITALIST
Payer: MEDICARE

## 2018-10-05 LAB
A/G RATIO: 1.5 (ref 1.1–2.2)
ALBUMIN SERPL-MCNC: 2.1 G/DL (ref 3.4–5)
ALP BLD-CCNC: 33 U/L (ref 40–129)
ALT SERPL-CCNC: 7 U/L (ref 10–40)
ANION GAP SERPL CALCULATED.3IONS-SCNC: 6 MMOL/L (ref 3–16)
AST SERPL-CCNC: 10 U/L (ref 15–37)
BILIRUB SERPL-MCNC: 0.6 MG/DL (ref 0–1)
BUN BLDV-MCNC: 12 MG/DL (ref 7–20)
CALCIUM SERPL-MCNC: 8 MG/DL (ref 8.3–10.6)
CHLORIDE BLD-SCNC: 115 MMOL/L (ref 99–110)
CO2: 22 MMOL/L (ref 21–32)
CREAT SERPL-MCNC: 0.7 MG/DL (ref 0.9–1.3)
GFR AFRICAN AMERICAN: >60
GFR NON-AFRICAN AMERICAN: >60
GLOBULIN: 1.4 G/DL
GLUCOSE BLD-MCNC: 114 MG/DL (ref 70–99)
GLUCOSE BLD-MCNC: 93 MG/DL (ref 70–99)
HCT VFR BLD CALC: 21.1 % (ref 40.5–52.5)
HCT VFR BLD CALC: 22.9 % (ref 40.5–52.5)
HCT VFR BLD CALC: 24.4 % (ref 40.5–52.5)
HCT VFR BLD CALC: 24.8 % (ref 40.5–52.5)
HEMOGLOBIN: 7.1 G/DL (ref 13.5–17.5)
HEMOGLOBIN: 7.6 G/DL (ref 13.5–17.5)
HEMOGLOBIN: 8.3 G/DL (ref 13.5–17.5)
HEMOGLOBIN: 8.4 G/DL (ref 13.5–17.5)
MCH RBC QN AUTO: 28.4 PG (ref 26–34)
MCHC RBC AUTO-ENTMCNC: 33 G/DL (ref 31–36)
MCV RBC AUTO: 86.1 FL (ref 80–100)
PDW BLD-RTO: 15 % (ref 12.4–15.4)
PERFORMED ON: ABNORMAL
PLATELET # BLD: 142 K/UL (ref 135–450)
PMV BLD AUTO: 7.7 FL (ref 5–10.5)
POTASSIUM SERPL-SCNC: 4 MMOL/L (ref 3.5–5.1)
RBC # BLD: 2.66 M/UL (ref 4.2–5.9)
SODIUM BLD-SCNC: 143 MMOL/L (ref 136–145)
TOTAL PROTEIN: 3.5 G/DL (ref 6.4–8.2)
WBC # BLD: 6.4 K/UL (ref 4–11)

## 2018-10-05 PROCEDURE — 2580000003 HC RX 258: Performed by: HOSPITALIST

## 2018-10-05 PROCEDURE — 6360000002 HC RX W HCPCS: Performed by: INTERNAL MEDICINE

## 2018-10-05 PROCEDURE — P9016 RBC LEUKOCYTES REDUCED: HCPCS

## 2018-10-05 PROCEDURE — 85014 HEMATOCRIT: CPT

## 2018-10-05 PROCEDURE — 85027 COMPLETE CBC AUTOMATED: CPT

## 2018-10-05 PROCEDURE — 80053 COMPREHEN METABOLIC PANEL: CPT

## 2018-10-05 PROCEDURE — 2000000000 HC ICU R&B

## 2018-10-05 PROCEDURE — 2580000003 HC RX 258: Performed by: INTERNAL MEDICINE

## 2018-10-05 PROCEDURE — 6370000000 HC RX 637 (ALT 250 FOR IP): Performed by: HOSPITALIST

## 2018-10-05 PROCEDURE — 85018 HEMOGLOBIN: CPT

## 2018-10-05 PROCEDURE — 99233 SBSQ HOSP IP/OBS HIGH 50: CPT | Performed by: INTERNAL MEDICINE

## 2018-10-05 PROCEDURE — 99232 SBSQ HOSP IP/OBS MODERATE 35: CPT | Performed by: SURGERY

## 2018-10-05 PROCEDURE — C9113 INJ PANTOPRAZOLE SODIUM, VIA: HCPCS | Performed by: INTERNAL MEDICINE

## 2018-10-05 PROCEDURE — 6360000002 HC RX W HCPCS: Performed by: NURSE PRACTITIONER

## 2018-10-05 PROCEDURE — 71045 X-RAY EXAM CHEST 1 VIEW: CPT

## 2018-10-05 RX ORDER — 0.9 % SODIUM CHLORIDE 0.9 %
250 INTRAVENOUS SOLUTION INTRAVENOUS ONCE
Status: DISCONTINUED | OUTPATIENT
Start: 2018-10-05 | End: 2018-10-05

## 2018-10-05 RX ORDER — 0.9 % SODIUM CHLORIDE 0.9 %
250 INTRAVENOUS SOLUTION INTRAVENOUS ONCE
Status: COMPLETED | OUTPATIENT
Start: 2018-10-05 | End: 2018-10-05

## 2018-10-05 RX ORDER — FUROSEMIDE 10 MG/ML
20 INJECTION INTRAMUSCULAR; INTRAVENOUS ONCE
Status: COMPLETED | OUTPATIENT
Start: 2018-10-05 | End: 2018-10-05

## 2018-10-05 RX ADMIN — INSULIN GLARGINE 14 UNITS: 100 INJECTION, SOLUTION SUBCUTANEOUS at 20:06

## 2018-10-05 RX ADMIN — FUROSEMIDE 20 MG: 10 INJECTION, SOLUTION INTRAMUSCULAR; INTRAVENOUS at 09:49

## 2018-10-05 RX ADMIN — SODIUM CHLORIDE 250 ML: 9 INJECTION, SOLUTION INTRAVENOUS at 02:55

## 2018-10-05 RX ADMIN — LAMOTRIGINE 100 MG: 100 TABLET ORAL at 09:49

## 2018-10-05 RX ADMIN — LEVOTHYROXINE SODIUM 150 MCG: 25 TABLET ORAL at 20:06

## 2018-10-05 RX ADMIN — Medication 10 ML: at 09:50

## 2018-10-05 RX ADMIN — DESMOPRESSIN ACETATE 40 MG: 0.2 TABLET ORAL at 20:06

## 2018-10-05 RX ADMIN — PANTOPRAZOLE SODIUM 40 MG: 40 INJECTION, POWDER, FOR SOLUTION INTRAVENOUS at 09:49

## 2018-10-05 RX ADMIN — Medication 10 ML: at 20:35

## 2018-10-05 ASSESSMENT — PAIN SCALES - GENERAL
PAINLEVEL_OUTOF10: 0

## 2018-10-05 NOTE — CARE COORDINATION
Discharge planning-    Spoke with the patient re: discharge planning needs. Patient reports that he lives at home with family. Patient reports that he was active with home care services prior to admission with [id number]. Patient's plan is to return home and resume care at discharge. Patient reports that he uses Dr. Josh Grace as his PCP in the community. Patient identifies no other needs at this time. Called [facility], spoke with Scarlet Gates in intake. Facesheet and H&P sent via fax. Plan- Home with [facility].     Will continue to follow for support and discharge planning.    -Taylor Barajas, MSW, LSW
[address] [facility]   [phone]

## 2018-10-05 NOTE — PROGRESS NOTES
from my perspective (on fulls today, if tolerates then ok my standpoint softs late today or tomorrow)  - prbc prn  - hold off acute prep/colonoscopy as may have some transient ischemia s/p coiling  - surgery following  - if no further bleeding would rec prep and colonoscopy (none in past, grandparent had colon pathology) in 1- 2 months when less risk colon ischemia-- I discussed with pt/family can be here with me or at [facility] which is much closer for them; call office if want pursue here. - hold asa/plavix as massive gi blood loss/life threatening at admit, and had cabg but no reported stent; will need reassess risk/benefit of blood thinners over time with primary team.   Otherwise will sign off, call if needed.     Michelle Guillen MD  [facility] and [address]

## 2018-10-05 NOTE — PROGRESS NOTES
atorvastatin (LIPITOR) 40 MG tablet Take 1 tablet by mouth nightly 9/17/18  Yes Kylie Hernandez MD   lisinopril (PRINIVIL;ZESTRIL) 5 MG tablet Take 1 tablet by mouth daily  Patient taking differently: Take 10 mg by mouth daily  9/17/18  Yes Kylie Hernandez MD   clopidogrel (PLAVIX) 75 MG tablet TAKE ONE TABLET BY MOUTH DAILY 8/27/18  Yes Kylie Hernandez MD   lamoTRIgine (LAMICTAL) 25 MG tablet Take 3 tablets PO daily. Follow up with MD for further titrations. Patient taking differently: Take 100 mg by mouth daily  8/24/18  Yes ANA Meier CNP   metoprolol tartrate (LOPRESSOR) 50 MG tablet Take 1 tablet by mouth 2 times daily 8/24/18  Yes ANA Meier CNP   docusate sodium (COLACE, DULCOLAX) 100 MG CAPS Take 100 mg by mouth 2 times daily 8/24/18  Yes ANA Meier CNP   paliperidone Palmitate (Andressa Ivan) 819 MG/2.625ML SUSP IM injection Inject 819 mg into the muscle every 3 months   Yes Historical Provider, MD   alogliptin-metformin (KAZANO) 12.5-1000 MG TABS Take 1 capsule by mouth 2 times daily   Yes Historical Provider, MD   insulin aspart (NOVOLOG) 100 UNIT/ML injection vial Inject 8 Units into the skin 3 times daily (before meals) For high blood sugar as needed   Yes Historical Provider, MD   levothyroxine (SYNTHROID) 150 MCG tablet Take 150 mcg by mouth nightly   Yes Historical Provider, MD   b complex vitamins capsule Take 1 capsule by mouth 2 times daily   Yes Historical Provider, MD   insulin glargine (BASAGLAR KWIKPEN) 100 UNIT/ML injection pen Inject 14 Units into the skin every morning    Yes Historical Provider, MD   aspirin 81 MG EC tablet Take 1 tablet by mouth daily 3/23/18  Yes Joanne Sacks, MD   folic acid (FOLVITE) 1 MG tablet Take 1 mg by mouth daily   Yes Historical Provider, MD        Allergies:  Dye [iodides]    Social History:    reports that he has quit smoking. His smoking use included Cigars.  He has never used smokeless

## 2018-10-05 NOTE — PROGRESS NOTES
Right femoral sheath removed, pressure applied 2cm above site for 40 minutes, pedal pulse checks 2 minutes and were +1 to +2, pressure dressing and kerlix applied, pt tolerated well. Educated on importance of lying flat for 6 hours, verbalizes understanding, will continue to monitor.    Ryan Courtney RN BSN

## 2018-10-05 NOTE — PROGRESS NOTES
P Pulmonary and Critical Care   Progress Note      Reason for Consult: Acute lower GI bleed, anemia, shock   Requesting Physician: Dr. Teodoro Maravilla:   [address] [facility] / HPI:                The patient is a 53 y.o. male with significant past medical history of:      Diagnosis Date    Bipolar affective ([facility]     CAD (coronary artery disease) 04/2018     of mLAD    Depression     Diabetes mellitus ([facility]     Hashimoto disease     Hyperlipidemia     Hypertension     Lithium intoxication 06/30/2018    required one time dialysis treatment for toxicity    Schizophrenia ([facility]     Sleep apnea      The patient was transferred from [facility] earlier today with lower GI bleed. Been passing copious amounts of bright red blood per rectum. He was evaluated by GI and surgery in [facility] and possible IR embolization was considered. For this reason the patient was transferred here. CTA in our emergency room did not reveal active bleeding and iron felt embolization at this point could not be performed. The patient continues to pass some bright red blood and clots per rectum and has been hemodynamically stable. His bleeding is complicated by the fact that he has been on aspirin and Plavix since CABG in August.      He ultimately got IR embolization of arterial bleed in the colon. He had a total of 14 units packed red blood cells as well. Since returning from embolization, no obvious further bleeding.        Past Surgical History:        Procedure Laterality Date    CARDIAC CATHETERIZATION  03/22/2018    100% occlusion of the mLAD    CORONARY ANGIOPLASTY  05/29/2018    POBA of LAD    MN CABG, ARTERIAL, THREE N/A 8/20/2018    CORONARY ARTERY BYPASS GRAFTING X3, INTERNAL MAMMARY ARTERY, SAPHENOUS VEIN GRAFT, ON PUMP performed by Dat Loving MD at [address] 10/3/2018    EGD DIAGNOSTIC ONLY performed by Noretta Koyanagi, MD at [address] [place] Medications:    Current Facility-Administered Medications: 0.9 % sodium chloride bolus, 250 mL, Intravenous, Once  furosemide (LASIX) injection 20 mg, 20 mg, Intravenous, Once  insulin glargine (LANTUS) injection pen 14 Units, 14 Units, Subcutaneous, Nightly  glucose (GLUTOSE) 40 % oral gel 15 g, 15 g, Oral, PRN  dextrose 50 % solution 12.5 g, 12.5 g, Intravenous, PRN  glucagon (rDNA) injection 1 mg, 1 mg, Intramuscular, PRN  dextrose 5 % solution, 100 mL/hr, Intravenous, PRN  atorvastatin (LIPITOR) tablet 40 mg, 40 mg, Oral, Nightly  lamoTRIgine (LAMICTAL) tablet 100 mg, 100 mg, Oral, Daily  levothyroxine (SYNTHROID) tablet 150 mcg, 150 mcg, Oral, Nightly  sodium chloride flush 0.9 % injection 10 mL, 10 mL, Intravenous, 2 times per day  sodium chloride flush 0.9 % injection 10 mL, 10 mL, Intravenous, PRN  acetaminophen (TYLENOL) tablet 650 mg, 650 mg, Oral, Q4H PRN  ondansetron (ZOFRAN) injection 4 mg, 4 mg, Intravenous, Q6H PRN  sodium chloride flush 0.9 % injection 10 mL, 10 mL, Intravenous, PRN  0.9 % sodium chloride bolus, 250 mL, Intravenous, Once  pantoprazole (PROTONIX) injection 40 mg, 40 mg, Intravenous, Daily  0.9 % sodium chloride bolus, 250 mL, Intravenous, Once  0.9 % sodium chloride bolus, 250 mL, Intravenous, Once  norepinephrine (LEVOPHED) 16 mg in dextrose 5% 250 mL infusion, 0.02 mcg/kg/min, Intravenous, Continuous  0.9 % sodium chloride bolus, 250 mL, Intravenous, Once  0.9 % sodium chloride bolus, 250 mL, Intravenous, Once    Allergies   Allergen Reactions    Dye [Iodides]      States feel drunk afterwards-last 8 hours       Social History:    TOBACCO:   reports that he has quit smoking. His smoking use included Cigars. He has never used smokeless tobacco.  ETOH:   reports that he does not drink alcohol. Patient currently lives independently  Environmental/chemical exposure: None known     Family History:   No family history on file.   REVIEW OF SYSTEMS:    CONSTITUTIONAL:  negative for fevers,

## 2018-10-06 ENCOUNTER — APPOINTMENT (OUTPATIENT)
Dept: GENERAL RADIOLOGY | Age: 48
DRG: 356 | End: 2018-10-06
Attending: HOSPITALIST
Payer: MEDICARE

## 2018-10-06 LAB
ANION GAP SERPL CALCULATED.3IONS-SCNC: 6 MMOL/L (ref 3–16)
BUN BLDV-MCNC: 10 MG/DL (ref 7–20)
CALCIUM SERPL-MCNC: 8 MG/DL (ref 8.3–10.6)
CHLORIDE BLD-SCNC: 110 MMOL/L (ref 99–110)
CO2: 25 MMOL/L (ref 21–32)
CREAT SERPL-MCNC: 0.7 MG/DL (ref 0.9–1.3)
GFR AFRICAN AMERICAN: >60
GFR NON-AFRICAN AMERICAN: >60
GLUCOSE BLD-MCNC: 120 MG/DL (ref 70–99)
GLUCOSE BLD-MCNC: 97 MG/DL (ref 70–99)
HCT VFR BLD CALC: 21.8 % (ref 40.5–52.5)
HEMOGLOBIN: 7.5 G/DL (ref 13.5–17.5)
MCH RBC QN AUTO: 29.6 PG (ref 26–34)
MCHC RBC AUTO-ENTMCNC: 34.3 G/DL (ref 31–36)
MCV RBC AUTO: 86.5 FL (ref 80–100)
PDW BLD-RTO: 14.8 % (ref 12.4–15.4)
PERFORMED ON: ABNORMAL
PLATELET # BLD: 145 K/UL (ref 135–450)
PMV BLD AUTO: 7.8 FL (ref 5–10.5)
POTASSIUM SERPL-SCNC: 3.9 MMOL/L (ref 3.5–5.1)
RBC # BLD: 2.52 M/UL (ref 4.2–5.9)
SODIUM BLD-SCNC: 141 MMOL/L (ref 136–145)
WBC # BLD: 5 K/UL (ref 4–11)

## 2018-10-06 PROCEDURE — G8982 BODY POS GOAL STATUS: HCPCS

## 2018-10-06 PROCEDURE — G8978 MOBILITY CURRENT STATUS: HCPCS

## 2018-10-06 PROCEDURE — 99233 SBSQ HOSP IP/OBS HIGH 50: CPT | Performed by: INTERNAL MEDICINE

## 2018-10-06 PROCEDURE — 97116 GAIT TRAINING THERAPY: CPT

## 2018-10-06 PROCEDURE — G8979 MOBILITY GOAL STATUS: HCPCS

## 2018-10-06 PROCEDURE — 99232 SBSQ HOSP IP/OBS MODERATE 35: CPT | Performed by: SURGERY

## 2018-10-06 PROCEDURE — 6360000002 HC RX W HCPCS: Performed by: INTERNAL MEDICINE

## 2018-10-06 PROCEDURE — 97161 PT EVAL LOW COMPLEX 20 MIN: CPT

## 2018-10-06 PROCEDURE — G8981 BODY POS CURRENT STATUS: HCPCS

## 2018-10-06 PROCEDURE — 80048 BASIC METABOLIC PNL TOTAL CA: CPT

## 2018-10-06 PROCEDURE — 2580000003 HC RX 258: Performed by: HOSPITALIST

## 2018-10-06 PROCEDURE — C9113 INJ PANTOPRAZOLE SODIUM, VIA: HCPCS | Performed by: INTERNAL MEDICINE

## 2018-10-06 PROCEDURE — 71045 X-RAY EXAM CHEST 1 VIEW: CPT

## 2018-10-06 PROCEDURE — 6370000000 HC RX 637 (ALT 250 FOR IP): Performed by: HOSPITALIST

## 2018-10-06 PROCEDURE — 97530 THERAPEUTIC ACTIVITIES: CPT

## 2018-10-06 PROCEDURE — 2000000000 HC ICU R&B

## 2018-10-06 PROCEDURE — 97165 OT EVAL LOW COMPLEX 30 MIN: CPT

## 2018-10-06 PROCEDURE — 36592 COLLECT BLOOD FROM PICC: CPT

## 2018-10-06 PROCEDURE — 85027 COMPLETE CBC AUTOMATED: CPT

## 2018-10-06 RX ADMIN — Medication 10 ML: at 09:02

## 2018-10-06 RX ADMIN — DESMOPRESSIN ACETATE 40 MG: 0.2 TABLET ORAL at 20:32

## 2018-10-06 RX ADMIN — Medication 10 ML: at 20:33

## 2018-10-06 RX ADMIN — PANTOPRAZOLE SODIUM 40 MG: 40 INJECTION, POWDER, FOR SOLUTION INTRAVENOUS at 09:02

## 2018-10-06 RX ADMIN — INSULIN GLARGINE 14 UNITS: 100 INJECTION, SOLUTION SUBCUTANEOUS at 20:33

## 2018-10-06 RX ADMIN — LAMOTRIGINE 100 MG: 100 TABLET ORAL at 09:02

## 2018-10-06 RX ADMIN — LEVOTHYROXINE SODIUM 150 MCG: 25 TABLET ORAL at 20:32

## 2018-10-06 ASSESSMENT — PAIN SCALES - GENERAL
PAINLEVEL_OUTOF10: 0

## 2018-10-06 ASSESSMENT — PAIN SCALES - WONG BAKER: WONGBAKER_NUMERICALRESPONSE: 0

## 2018-10-06 NOTE — PROGRESS NOTES
[facility]    10/6/2018 10:52 AM        Name: Jayna Murdock . Admitted: 10/3/2018  Primary Care Provider: ANA Perrin CNP (Tel: [phone])    Brief Course:  Transfer from OSH for GI bleeding,       CC: no new complaints    Subjective: Garlon Odor     Drop in Hb  But no active bleeding       Reviewed interval ancillary notes    Current Medications    insulin glargine (LANTUS) injection pen 14 Units Nightly   glucose (GLUTOSE) 40 % oral gel 15 g PRN   dextrose 50 % solution 12.5 g PRN   glucagon (rDNA) injection 1 mg PRN   dextrose 5 % solution PRN   atorvastatin (LIPITOR) tablet 40 mg Nightly   lamoTRIgine (LAMICTAL) tablet 100 mg Daily   levothyroxine (SYNTHROID) tablet 150 mcg Nightly   sodium chloride flush 0.9 % injection 10 mL 2 times per day   sodium chloride flush 0.9 % injection 10 mL PRN   acetaminophen (TYLENOL) tablet 650 mg Q4H PRN   ondansetron (ZOFRAN) injection 4 mg Q6H PRN   sodium chloride flush 0.9 % injection 10 mL PRN   0.9 % sodium chloride bolus Once   pantoprazole (PROTONIX) injection 40 mg Daily   0.9 % sodium chloride bolus Once   0.9 % sodium chloride bolus Once   norepinephrine (LEVOPHED) 16 mg in dextrose 5% 250 mL infusion Continuous   0.9 % sodium chloride bolus Once   0.9 % sodium chloride bolus Once       Objective:  /60   Pulse 60   Temp 97.2 °F (36.2 °C) (Temporal)   Resp 11   Ht 6' (1.829 m)   Wt 192 lb 10.9 oz (87.4 kg)   SpO2 100%   BMI 26.13 kg/m²     Intake/Output Summary (Last 24 hours) at 10/06/18 1052  Last data filed at 10/06/18 0600   Gross per 24 hour   Intake             1485 ml   Output             4350 ml   Net            -2865 ml    Wt Readings from Last 3 Encounters:   10/06/18 192 lb 10.9 oz (87.4 kg)   10/03/18 184 lb (83.5 kg)   10/03/18 184 lb (83.5 kg)     RT IJ noticed  Lungs clear  Abdomen soft  General appearance:  Appears comfortable  Eyes: Sclera clear. Pupils equal.  ENT: Moist oral mucosa. Trachea midline, no adenopathy. Cardiovascular: Regular rhythm, normal S1, S2. No murmur. No edema in lower extremities  Respiratory: Not using accessory muscles. Good inspiratory effort. Clear to auscultation bilaterally, no wheeze or crackles. GI: Abdomen soft, no tenderness, not distended, normal bowel sounds  Musculoskeletal: No cyanosis in digits, neck supple  Neurology: CN 2-12 grossly intact. No speech or motor deficits  Psych: Normal affect. Alert and oriented in time, place and person  Skin: Warm, dry, normal turgor  Extremity exam shows brisk capillary refill. Peripheral pulses are palpable in lower extremities     extremities     Labs and Tests:  CBC:   Recent Labs      10/04/18   0555   10/05/18   0601  10/05/18   1155  10/05/18   1837  10/06/18   0440   WBC  9.2   --   6.4   --    --   5.0   HGB  6.9*   < >  7.6*  8.4*  8.3*  7.5*   PLT  214   --   142   --    --   145    < > = values in this interval not displayed. BMP:  Recent Labs      10/04/18   0555  10/05/18   0601  10/06/18   0440   NA  139  143  141   K  4.4  4.0  3.9   CL  112*  115*  110   CO2  16*  22  25   BUN  16  12  10   CREATININE  0.9  0.7*  0.7*   GLUCOSE  250*  93  97     Hepatic: Recent Labs      10/03/18   1550  10/04/18   0555  10/05/18   0601   AST  9*  10*  10*   ALT  8*  7*  7*   BILITOT  1.3*  0.5  0.6   ALKPHOS  42  33*  33*     XR CHEST PORTABLE   Final Result   Stable exam.  No new acute cardiopulmonary findings. XR CHEST PORTABLE   Final Result   No acute findings. XR CHEST PORTABLE   Final Result   1. Suboptimal AP lordotic projection with poor inspiration. 2. No definite radiographic evidence of acute cardiopulmonary disease. 3. Previous CABG. IR ANGIOGRAM SUPERIOR MESENTERIC   Final Result   1.   Successful coil and Gelfoam embolization of a higher order branch of

## 2018-10-06 NOTE — PROGRESS NOTES
Physical Therapy    Facility/Department: [facility] ICU  Initial Assessment/Discharge summary    NAME: Brianna Mathias  : 1970  MRN: [id number]      This patient was seen on 10/6/18 for evaluation only, prior to discharge from the hospital to home. Please refer to the initial evaluation below for the functional status at discharge, as this will serve as the discharge note. Jasbir Barrios , [place] [id number]    Date of Service: 10/6/2018    Discharge Recommendations:Perry Garcia scored a 18/ on the AM-PAC short mobility form. Current research shows that an AM-PAC score of 18 or greater is typically associated with a discharge to the patient's home setting. Based on the patients AM-PAC score and their current functional mobility deficits, it is recommended that the patient have 2-3 sessions per week of Physical Therapy at d/c to increase the patients independence. HOME HEALTH CARE: LEVEL 1 STANDARD    - Initial home health evaluation to occur within 24-48 hours, in patient home   - Therapy to evaluate with goal of regaining prior level of functioning   - Therapy to evaluate if patient has [address] needs for personal care               Patient Diagnosis(es): There were no encounter diagnoses. has a past medical history of Bipolar affective ([address] CAD (coronary artery disease); Depression; Diabetes mellitus ([address] Hashimoto disease; Hyperlipidemia; Hypertension; Lithium intoxication; Schizophrenia ([address] and Sleep apnea. has a past surgical history that includes Coronary angioplasty (2018); Cardiac catheterization (2018); pr cabg, arterial, three (N/A, 2018); and Upper gastrointestinal endoscopy (N/A, 10/3/2018). Restrictions  Restrictions/Precautions  Restrictions/Precautions: Fall Risk, General Precautions (medium)  Required Braces or Orthoses?: No  Position Activity Restriction  Other position/activity restrictions: Patient is a 53 y.o.   male admitted with GI bleed K92.2 who is seen in consult for the same. Pt is s/p CABG and is on ASA and Plavix. Pt was well until yesterday he began having mid ab discomfort and then started passing BRBPR. He denies melena. No vomiting or hematemesis. He went to [facility] and was evaluated via er.  he was seen by GI and Surgery there. He did receive at least 6 units prbc and fluids there and still needed pressors. He was transferred here for possible angiography due to the instability. Once here he had CTA which was negative for active bleeding. So, no angio is planned now. Vision/Hearing  Vision: Impaired  Vision Exceptions: Wears glasses for reading  Hearing: Within functional limits     Subjective  General  Chart Reviewed: Yes  Family / Caregiver Present: No  Referring Practitioner: Yaakov PEREZ  Referral Date : 10/05/18  Diagnosis: GI bleed  Follows Commands: Within Functional Limits  General Comment  Comments: Patient lying in bed. Subjective  Subjective: Patient agreeable to PT evaluation.   Pain Screening  Patient Currently in Pain: Denies  Pain Assessment  Pain Assessment: 0-10  Pain Level: 0  Tang-Baker Pain Rating: No hurt  Vital Signs  Patient Currently in Pain: Denies       Orientation  Orientation  Overall Orientation Status: Within Normal Limits    Social/Functional History  Social/Functional History  Lives With: Significant other  Type of Home: House  Home Layout: Two level, Able to Live on Main level with bedroom/bathroom  Home Access: Ramped entrance  Bathroom Shower/Tub: Tub/Shower unit  Bathroom Toilet: Standard  Bathroom Equipment: Shower chair  Home Equipment: Hernandez Bur, Rolling walker  Receives Help From: Family  ADL Assistance: Independent  Homemaking Assistance: Independent (shared with SO)  Ambulation Assistance: Independent  Transfer Assistance: Independent  Active : No  Mode of Transportation: Family  Occupation: Unemployed  Leisure & Hobbies: going to mall to walk and playing video games  Additional Comments: PLOF per pt report: SO does not work and is home with pt all day/night; pt reports no falls in last 6 months; Independent with mobility and ADL's; shared IADL's with SO.    Objective          PROM RLE (degrees)  RLE PROM: WFL  PROM LLE (degrees)  LLE PROM: WFL  Strength RLE  Strength RLE: WFL  Comment: R hip extension 3-/5, R hip AB 4-/5, knee/ankle 4+/5  Strength LLE  Strength LLE: WFL  Comment: L Hip AB 3-/5, R Hip extension 4-/5, knee/ankle 4+/5        Bed mobility  Rolling to Left: Modified independent  Supine to Sit: Supervision  Sit to Supine: Supervision  Scooting: Modified independent  Comment: takes extra time to complete with 1-2 rest breaks in between movements  Transfers  Sit to Stand: Stand by assistance  Stand to sit: Stand by assistance  Bed to Chair: Stand by assistance  Stand Pivot Transfers: Stand by assistance  Ambulation  Ambulation?: Yes  More Ambulation?: Yes  Ambulation 1  Surface: level tile  Device: Rolling Walker  Assistance: Modified Independent  Quality of Gait: shuffling gait  Distance: 25'  Ambulation 2  Surface - 2: level tile  Device 2: No device  Assistance 2: Stand by assistance  Quality of Gait 2: improved speed and step length  Distance: 120'  Stairs/Curb  Stairs?: No     Balance  Posture: Good  Sitting - Static: Good  Sitting - Dynamic: Good  Standing - Static: Good  Standing - Dynamic: Fair;+        Assessment   Body structures, Functions, Activity limitations: Decreased functional mobility ; Decreased endurance;Decreased strength;Decreased high-level IADLs;Decreased balance  Assessment: Patient presents with dcereased functional LE strength and decreased tolerance for functional activity following a rectal bleed. Patient has had multiple blood transfusions and [facility]   Patient will benefit from skilled PT to address his impairments and functional limitations and restore him to his

## 2018-10-06 NOTE — PROGRESS NOTES
Occupational Therapy   Occupational Therapy Initial Assessment  Date: 10/6/2018   Patient Name: Anastasia Palmer  MRN: [id number]     : 1970    Date of Service: 10/6/2018    Discharge Recommendations:Perry Garcia scored a 19/24 on the AM-PAC ADL Inpatient form. Current research shows that an AM-PAC score of 18 or greater is typically associated with a discharge to the patient's home setting. Based on the patients AM-PAC score and their current ADL deficits, it is recommended that the patient have 2-3 sessions per week of Occupational Therapy at d/c to increase the patients independence. HOME HEALTH CARE: LEVEL 1 STANDARD    - Initial home health evaluation to occur within 24-48 hours, in patient home   - Therapy to evaluate with goal of regaining prior level of functioning   - Therapy to evaluate if patient has [address] needs for personal care       OT Equipment Recommendations  Equipment Needed: No      Patient Diagnosis(es): There were no encounter diagnoses. has a past medical history of Bipolar affective ([facility] [facility] CAD (coronary artery disease); Depression; Diabetes mellitus ([facility] [facility] Hashimoto disease; Hyperlipidemia; Hypertension; Lithium intoxication; Schizophrenia ([facility] and Sleep apnea. has a past surgical history that includes Coronary angioplasty (2018); Cardiac catheterization (2018); pr cabg, arterial, three (N/A, 2018); and Upper gastrointestinal endoscopy (N/A, 10/3/2018). Treatment Diagnosis: Decreased mobility, ADL status, ADL transfers, endurance, and high level IADL's associated with Colon Bleed. Restrictions  Restrictions/Precautions  Restrictions/Precautions: Fall Risk, General Precautions (medium)  Required Braces or Orthoses?: No  Position Activity Restriction  Other position/activity restrictions: Patient is a 53 y.o.  male admitted with GI bleed K92.2 who is seen in consult for the same.   Pt is s/p CABG and is on ASA and

## 2018-10-06 NOTE — PROGRESS NOTES
P Pulmonary and Critical Care   Progress Note      Reason for Consult: Acute lower GI bleed, anemia, shock   Requesting Physician: Dr. Joan Starks:   [facility]:                The patient is a 53 y.o. male with significant past medical history of:      Diagnosis Date    Bipolar affective ([facility]     CAD (coronary artery disease) 04/2018     of mLAD    Depression     Diabetes mellitus ([facility]     Hashimoto disease     Hyperlipidemia     Hypertension     Lithium intoxication 06/30/2018    required one time dialysis treatment for toxicity    Schizophrenia ([facility]     Sleep apnea      The patient was transferred from [facility] earlier today with lower GI bleed. Been passing copious amounts of bright red blood per rectum. He was evaluated by GI and surgery in [place] and possible IR embolization was considered. For this reason the patient was transferred here. CTA in our emergency room did not reveal active bleeding and iron felt embolization at this point could not be performed. The patient continues to pass some bright red blood and clots per rectum and has been hemodynamically stable. His bleeding is complicated by the fact that he has been on aspirin and Plavix since CABG in August.      He ultimately got IR embolization of arterial bleed in the colon. He had a total of 14 units packed red blood cells as well. Since returning from embolization, no obvious further bleeding.        Past Surgical History:        Procedure Laterality Date    CARDIAC CATHETERIZATION  03/22/2018    100% occlusion of the mLAD    CORONARY ANGIOPLASTY  05/29/2018    POBA of LAD    AL CABG, ARTERIAL, THREE N/A 8/20/2018    CORONARY ARTERY BYPASS GRAFTING X3, INTERNAL MAMMARY ARTERY, SAPHENOUS VEIN GRAFT, ON PUMP performed by Theodor Romberg, MD at [address] [facility] 10/3/2018    EGD DIAGNOSTIC ONLY performed by Ana Ryan MD at [address] [facility] negative for blurred vision, eye discharge, visual disturbance and icterus  HEENT:  negative for hearing loss, tinnitus, ear drainage, sinus pressure, nasal congestion, epistaxis and snoring  RESPIRATORY:  See HPI  CARDIOVASCULAR:  negative for chest pain, palpitations, exertional chest pressure/discomfort, edema, syncope  GASTROINTESTINAL:  negative for nausea, vomiting, diarrhea, constipation, blood in stool and abdominal pain  GENITOURINARY:  negative for frequency, dysuria, urinary incontinence, decreased urine volume, and hematuria  HEMATOLOGIC/LYMPHATIC:  negative for easy bruising, bleeding and lymphadenopathy  ALLERGIC/IMMUNOLOGIC:  negative for recurrent infections, angioedema, anaphylaxis and drug reactions  ENDOCRINE:  negative for weight changes and diabetic symptoms including polyuria, polydipsia and polyphagia  MUSCULOSKELETAL:  negative for  pain, joint swelling, decreased range of motion and muscle weakness  NEUROLOGICAL:  negative for headaches, slurred speech, unilateral weakness  PSYCHIATRIC/BEHAVIORAL: negative for hallucinations, behavioral problems, confusion and agitation. Objective:   PHYSICAL EXAM:      VITALS:  /65   Pulse 53   Temp 97.4 °F (36.3 °C) (Temporal)   Resp 12   Ht 6' (1.829 m)   Wt 192 lb 10.9 oz (87.4 kg)   SpO2 98%   BMI 26.13 kg/m²      24HR INTAKE/OUTPUT:      Intake/Output Summary (Last 24 hours) at 10/06/18 0819  Last data filed at 10/06/18 0600   Gross per 24 hour   Intake             1485 ml   Output             5000 ml   Net            -3515 ml     CONSTITUTIONAL:  His complexion is pale and he feels uncomfortable  NECK:  Supple, symmetrical, trachea midline, no adenopathy, thyroid symmetric, not enlarged and no tenderness, skin normal  LUNGS:  no increased work of breathing and clear to auscultation.  No accessory muscle use  CARDIOVASCULAR: S1 and S2, no edema and no JVD  ABDOMEN:  Soft but mildly tender in the lower quadrant  LYMPHADENOPATHY:  no

## 2018-10-06 NOTE — PROGRESS NOTES
Reassessment complete, see flow sheets. Pt sleeping in bed, call light in reach. No acute changes, will continue to monitor.   Marybel De Souza RN BSN

## 2018-10-07 LAB
ABO/RH: NORMAL
ANION GAP SERPL CALCULATED.3IONS-SCNC: 7 MMOL/L (ref 3–16)
ANTIBODY SCREEN: NORMAL
BLOOD BANK DISPENSE STATUS: NORMAL
BLOOD BANK PRODUCT CODE: NORMAL
BPU ID: NORMAL
BUN BLDV-MCNC: 8 MG/DL (ref 7–20)
CALCIUM SERPL-MCNC: 8.6 MG/DL (ref 8.3–10.6)
CHLORIDE BLD-SCNC: 111 MMOL/L (ref 99–110)
CO2: 27 MMOL/L (ref 21–32)
CREAT SERPL-MCNC: 0.7 MG/DL (ref 0.9–1.3)
DESCRIPTION BLOOD BANK: NORMAL
GFR AFRICAN AMERICAN: >60
GFR NON-AFRICAN AMERICAN: >60
GLUCOSE BLD-MCNC: 120 MG/DL (ref 70–99)
GLUCOSE BLD-MCNC: 98 MG/DL (ref 70–99)
HCT VFR BLD CALC: 22.4 % (ref 40.5–52.5)
HEMOGLOBIN: 7.6 G/DL (ref 13.5–17.5)
MCH RBC QN AUTO: 29.1 PG (ref 26–34)
MCHC RBC AUTO-ENTMCNC: 33.8 G/DL (ref 31–36)
MCV RBC AUTO: 86 FL (ref 80–100)
PDW BLD-RTO: 14.5 % (ref 12.4–15.4)
PERFORMED ON: ABNORMAL
PLATELET # BLD: 202 K/UL (ref 135–450)
PMV BLD AUTO: 7.5 FL (ref 5–10.5)
POTASSIUM SERPL-SCNC: 3.9 MMOL/L (ref 3.5–5.1)
RBC # BLD: 2.6 M/UL (ref 4.2–5.9)
SODIUM BLD-SCNC: 145 MMOL/L (ref 136–145)
WBC # BLD: 5.1 K/UL (ref 4–11)

## 2018-10-07 PROCEDURE — 36430 TRANSFUSION BLD/BLD COMPNT: CPT

## 2018-10-07 PROCEDURE — 86901 BLOOD TYPING SEROLOGIC RH(D): CPT

## 2018-10-07 PROCEDURE — P9016 RBC LEUKOCYTES REDUCED: HCPCS

## 2018-10-07 PROCEDURE — 6370000000 HC RX 637 (ALT 250 FOR IP): Performed by: HOSPITALIST

## 2018-10-07 PROCEDURE — 86850 RBC ANTIBODY SCREEN: CPT

## 2018-10-07 PROCEDURE — C9113 INJ PANTOPRAZOLE SODIUM, VIA: HCPCS | Performed by: INTERNAL MEDICINE

## 2018-10-07 PROCEDURE — 80048 BASIC METABOLIC PNL TOTAL CA: CPT

## 2018-10-07 PROCEDURE — 86900 BLOOD TYPING SEROLOGIC ABO: CPT

## 2018-10-07 PROCEDURE — 86923 COMPATIBILITY TEST ELECTRIC: CPT

## 2018-10-07 PROCEDURE — 2580000003 HC RX 258: Performed by: HOSPITALIST

## 2018-10-07 PROCEDURE — 1200000000 HC SEMI PRIVATE

## 2018-10-07 PROCEDURE — 6360000002 HC RX W HCPCS: Performed by: INTERNAL MEDICINE

## 2018-10-07 PROCEDURE — 85027 COMPLETE CBC AUTOMATED: CPT

## 2018-10-07 RX ORDER — 0.9 % SODIUM CHLORIDE 0.9 %
250 INTRAVENOUS SOLUTION INTRAVENOUS ONCE
Status: DISCONTINUED | OUTPATIENT
Start: 2018-10-07 | End: 2018-10-08 | Stop reason: HOSPADM

## 2018-10-07 RX ADMIN — PANTOPRAZOLE SODIUM 40 MG: 40 INJECTION, POWDER, FOR SOLUTION INTRAVENOUS at 09:50

## 2018-10-07 RX ADMIN — Medication 10 ML: at 09:50

## 2018-10-07 RX ADMIN — LAMOTRIGINE 100 MG: 100 TABLET ORAL at 09:50

## 2018-10-07 RX ADMIN — Medication 10 ML: at 20:57

## 2018-10-07 RX ADMIN — DESMOPRESSIN ACETATE 40 MG: 0.2 TABLET ORAL at 20:57

## 2018-10-07 RX ADMIN — LEVOTHYROXINE SODIUM 150 MCG: 25 TABLET ORAL at 20:57

## 2018-10-07 RX ADMIN — INSULIN GLARGINE 14 UNITS: 100 INJECTION, SOLUTION SUBCUTANEOUS at 21:10

## 2018-10-07 ASSESSMENT — PAIN SCALES - GENERAL
PAINLEVEL_OUTOF10: 0

## 2018-10-07 NOTE — PROGRESS NOTES
3.  The vascular sheath within the right common femoral artery was sutured in   place at the request of the ordering service. Discussed care with family      Problem List  Active Problems:    GI bleed    Hemorrhagic shock (HCC)    Coagulopathy ([address]  Resolved Problems:    * No resolved hospital problems. *       Assessment & Plan:   Hypovolemic shock sec to acute blood loss anemia sec to acute diverticular hemorrhage ; Resolved;off the levophed drip   s/p distal branch SMA coil and gelfoam embolization ; appreciate GI and  Surgery input  S/p 18 units of prbc uptill now  Hb = 7.6 ; transfuse 1 unit of red cells to Keep  Hb  Above 8.0  Due to  CAD with recent CABG   CAD s/p CABG ; resume low-dose beta blocker and monitor, consider to resume antiplatelet treatment within the next few days once okay with GI. Has appointment with primary cardiologist next week.   Diabetes mellitus type 2, continue insulin regimen  Dyslipidemia, continue statin  Hypothyroid, continue Levothyroxine  Disp ;  home with girl friend 24-48 hours  follow-up, PCP, GI, cardiology  Discussed with patient and his girlfriend managements/discharge plan  IV Access:IJ Rt side  Vela: can come out   Diet: DIET DENTAL SOFT;  Code:Full Code  DVT PPX SCDs  Claribel Souza MD   10/7/2018 1:38 PM

## 2018-10-08 ENCOUNTER — HOSPITAL ENCOUNTER (INPATIENT)
Age: 48
LOS: 3 days | Discharge: HOME HEALTH | DRG: 356 | End: 2018-10-11
Attending: INTERNAL MEDICINE | Admitting: INTERNAL MEDICINE
Payer: MEDICARE

## 2018-10-08 ENCOUNTER — HOSPITAL ENCOUNTER (EMERGENCY)
Age: 48
Discharge: OTHER INSTITUTION | End: 2018-10-08
Attending: EMERGENCY MEDICINE
Payer: MEDICARE

## 2018-10-08 VITALS
SYSTOLIC BLOOD PRESSURE: 155 MMHG | HEART RATE: 95 BPM | BODY MASS INDEX: 25.38 KG/M2 | HEIGHT: 72 IN | DIASTOLIC BLOOD PRESSURE: 96 MMHG | WEIGHT: 187.39 LBS | RESPIRATION RATE: 18 BRPM | TEMPERATURE: 97.3 F | OXYGEN SATURATION: 99 %

## 2018-10-08 VITALS
OXYGEN SATURATION: 98 % | DIASTOLIC BLOOD PRESSURE: 81 MMHG | HEIGHT: 72 IN | TEMPERATURE: 98.1 F | HEART RATE: 65 BPM | RESPIRATION RATE: 15 BRPM | WEIGHT: 200 LBS | SYSTOLIC BLOOD PRESSURE: 129 MMHG | BODY MASS INDEX: 27.09 KG/M2

## 2018-10-08 DIAGNOSIS — K92.2 GASTROINTESTINAL HEMORRHAGE, UNSPECIFIED GASTROINTESTINAL HEMORRHAGE TYPE: Primary | ICD-10-CM

## 2018-10-08 LAB
A/G RATIO: 1.2 (ref 1.1–2.2)
ABO/RH: NORMAL
ALBUMIN SERPL-MCNC: 3.1 G/DL (ref 3.4–5)
ALP BLD-CCNC: 59 U/L (ref 40–129)
ALT SERPL-CCNC: 11 U/L (ref 10–40)
ANION GAP SERPL CALCULATED.3IONS-SCNC: 11 MMOL/L (ref 3–16)
ANION GAP SERPL CALCULATED.3IONS-SCNC: 6 MMOL/L (ref 3–16)
ANISOCYTOSIS: ABNORMAL
ANTIBODY SCREEN: NORMAL
APTT: 33.9 SEC (ref 26–36)
AST SERPL-CCNC: 15 U/L (ref 15–37)
BANDED NEUTROPHILS RELATIVE PERCENT: 2 % (ref 0–7)
BASOPHILS ABSOLUTE: 0 K/UL (ref 0–0.2)
BASOPHILS ABSOLUTE: 0.1 K/UL (ref 0–0.2)
BASOPHILS RELATIVE PERCENT: 0 %
BASOPHILS RELATIVE PERCENT: 1 %
BILIRUB SERPL-MCNC: <0.2 MG/DL (ref 0–1)
BUN BLDV-MCNC: 7 MG/DL (ref 7–20)
BUN BLDV-MCNC: 7 MG/DL (ref 7–20)
CALCIUM SERPL-MCNC: 8.8 MG/DL (ref 8.3–10.6)
CALCIUM SERPL-MCNC: 9 MG/DL (ref 8.3–10.6)
CHLORIDE BLD-SCNC: 106 MMOL/L (ref 99–110)
CHLORIDE BLD-SCNC: 111 MMOL/L (ref 99–110)
CO2: 26 MMOL/L (ref 21–32)
CO2: 28 MMOL/L (ref 21–32)
CREAT SERPL-MCNC: 0.7 MG/DL (ref 0.9–1.3)
CREAT SERPL-MCNC: 0.9 MG/DL (ref 0.9–1.3)
EOSINOPHILS ABSOLUTE: 0.1 K/UL (ref 0–0.6)
EOSINOPHILS ABSOLUTE: 0.2 K/UL (ref 0–0.6)
EOSINOPHILS RELATIVE PERCENT: 1 %
EOSINOPHILS RELATIVE PERCENT: 2 %
GFR AFRICAN AMERICAN: >60
GFR AFRICAN AMERICAN: >60
GFR NON-AFRICAN AMERICAN: >60
GFR NON-AFRICAN AMERICAN: >60
GLOBULIN: 2.6 G/DL
GLUCOSE BLD-MCNC: 130 MG/DL (ref 70–99)
GLUCOSE BLD-MCNC: 144 MG/DL (ref 70–99)
HCT VFR BLD CALC: 24.6 % (ref 40.5–52.5)
HCT VFR BLD CALC: 25.4 % (ref 40.5–52.5)
HCT VFR BLD CALC: 27.2 % (ref 40.5–52.5)
HCT VFR BLD CALC: 27.3 % (ref 40.5–52.5)
HCT VFR BLD CALC: 30.7 % (ref 40.5–52.5)
HEMATOLOGY PATH CONSULT: NO
HEMOGLOBIN: 10.3 G/DL (ref 13.5–17.5)
HEMOGLOBIN: 8.2 G/DL (ref 13.5–17.5)
HEMOGLOBIN: 8.4 G/DL (ref 13.5–17.5)
HEMOGLOBIN: 9 G/DL (ref 13.5–17.5)
HEMOGLOBIN: 9 G/DL (ref 13.5–17.5)
INR BLD: 1.08 (ref 0.86–1.14)
LIPASE: 81 U/L (ref 13–60)
LYMPHOCYTES ABSOLUTE: 1.6 K/UL (ref 1–5.1)
LYMPHOCYTES ABSOLUTE: 1.9 K/UL (ref 1–5.1)
LYMPHOCYTES RELATIVE PERCENT: 23 %
LYMPHOCYTES RELATIVE PERCENT: 26 %
MCH RBC QN AUTO: 28.8 PG (ref 26–34)
MCH RBC QN AUTO: 29.2 PG (ref 26–34)
MCH RBC QN AUTO: 29.6 PG (ref 26–34)
MCHC RBC AUTO-ENTMCNC: 32.9 G/DL (ref 31–36)
MCHC RBC AUTO-ENTMCNC: 33.2 G/DL (ref 31–36)
MCHC RBC AUTO-ENTMCNC: 33.5 G/DL (ref 31–36)
MCV RBC AUTO: 86.8 FL (ref 80–100)
MCV RBC AUTO: 87.3 FL (ref 80–100)
MCV RBC AUTO: 89.8 FL (ref 80–100)
MONOCYTES ABSOLUTE: 0.3 K/UL (ref 0–1.3)
MONOCYTES ABSOLUTE: 0.5 K/UL (ref 0–1.3)
MONOCYTES RELATIVE PERCENT: 5 %
MONOCYTES RELATIVE PERCENT: 6 %
NEUTROPHILS ABSOLUTE: 4.1 K/UL (ref 1.7–7.7)
NEUTROPHILS ABSOLUTE: 5.5 K/UL (ref 1.7–7.7)
NEUTROPHILS RELATIVE PERCENT: 66 %
NEUTROPHILS RELATIVE PERCENT: 68 %
PDW BLD-RTO: 14.3 % (ref 12.4–15.4)
PDW BLD-RTO: 14.3 % (ref 12.4–15.4)
PDW BLD-RTO: 14.7 % (ref 12.4–15.4)
PLATELET # BLD: 205 K/UL (ref 135–450)
PLATELET # BLD: 237 K/UL (ref 135–450)
PLATELET # BLD: 324 K/UL (ref 135–450)
PLATELET SLIDE REVIEW: ADEQUATE
PMV BLD AUTO: 7.2 FL (ref 5–10.5)
PMV BLD AUTO: 7.6 FL (ref 5–10.5)
PMV BLD AUTO: 8.1 FL (ref 5–10.5)
POLYCHROMASIA: ABNORMAL
POTASSIUM REFLEX MAGNESIUM: 3.9 MMOL/L (ref 3.5–5.1)
POTASSIUM REFLEX MAGNESIUM: 3.9 MMOL/L (ref 3.5–5.1)
PROTHROMBIN TIME: 12.3 SEC (ref 9.8–13)
RBC # BLD: 2.92 M/UL (ref 4.2–5.9)
RBC # BLD: 3.03 M/UL (ref 4.2–5.9)
RBC # BLD: 3.52 M/UL (ref 4.2–5.9)
REASON FOR REJECTION: NORMAL
REJECTED TEST: NORMAL
SLIDE REVIEW: ABNORMAL
SODIUM BLD-SCNC: 143 MMOL/L (ref 136–145)
SODIUM BLD-SCNC: 145 MMOL/L (ref 136–145)
TOTAL PROTEIN: 5.7 G/DL (ref 6.4–8.2)
WBC # BLD: 6.1 K/UL (ref 4–11)
WBC # BLD: 8.1 K/UL (ref 4–11)
WBC # BLD: 9.2 K/UL (ref 4–11)

## 2018-10-08 PROCEDURE — 2000000000 HC ICU R&B

## 2018-10-08 PROCEDURE — C9113 INJ PANTOPRAZOLE SODIUM, VIA: HCPCS | Performed by: INTERNAL MEDICINE

## 2018-10-08 PROCEDURE — 2580000003 HC RX 258: Performed by: HOSPITALIST

## 2018-10-08 PROCEDURE — 85027 COMPLETE CBC AUTOMATED: CPT

## 2018-10-08 PROCEDURE — 86900 BLOOD TYPING SEROLOGIC ABO: CPT

## 2018-10-08 PROCEDURE — 85014 HEMATOCRIT: CPT

## 2018-10-08 PROCEDURE — 2580000003 HC RX 258: Performed by: EMERGENCY MEDICINE

## 2018-10-08 PROCEDURE — 86901 BLOOD TYPING SEROLOGIC RH(D): CPT

## 2018-10-08 PROCEDURE — 85025 COMPLETE CBC W/AUTO DIFF WBC: CPT

## 2018-10-08 PROCEDURE — 85018 HEMOGLOBIN: CPT

## 2018-10-08 PROCEDURE — 85610 PROTHROMBIN TIME: CPT

## 2018-10-08 PROCEDURE — 6370000000 HC RX 637 (ALT 250 FOR IP): Performed by: HOSPITALIST

## 2018-10-08 PROCEDURE — 6360000002 HC RX W HCPCS: Performed by: INTERNAL MEDICINE

## 2018-10-08 PROCEDURE — 99285 EMERGENCY DEPT VISIT HI MDM: CPT

## 2018-10-08 PROCEDURE — 83690 ASSAY OF LIPASE: CPT

## 2018-10-08 PROCEDURE — 36415 COLL VENOUS BLD VENIPUNCTURE: CPT

## 2018-10-08 PROCEDURE — 36592 COLLECT BLOOD FROM PICC: CPT

## 2018-10-08 PROCEDURE — 85730 THROMBOPLASTIN TIME PARTIAL: CPT

## 2018-10-08 PROCEDURE — 80048 BASIC METABOLIC PNL TOTAL CA: CPT

## 2018-10-08 PROCEDURE — 86850 RBC ANTIBODY SCREEN: CPT

## 2018-10-08 PROCEDURE — 80053 COMPREHEN METABOLIC PANEL: CPT

## 2018-10-08 RX ORDER — LISINOPRIL 5 MG/1
5 TABLET ORAL DAILY
Status: DISCONTINUED | OUTPATIENT
Start: 2018-10-08 | End: 2018-10-08 | Stop reason: HOSPADM

## 2018-10-08 RX ORDER — FOLIC ACID 1 MG/1
1 TABLET ORAL DAILY
Status: DISCONTINUED | OUTPATIENT
Start: 2018-10-09 | End: 2018-10-11 | Stop reason: HOSPADM

## 2018-10-08 RX ORDER — NICOTINE POLACRILEX 4 MG
15 LOZENGE BUCCAL PRN
Status: DISCONTINUED | OUTPATIENT
Start: 2018-10-08 | End: 2018-10-11 | Stop reason: HOSPADM

## 2018-10-08 RX ORDER — PANTOPRAZOLE SODIUM 40 MG/1
40 TABLET, DELAYED RELEASE ORAL
Qty: 30 TABLET | Refills: 3 | Status: SHIPPED | OUTPATIENT
Start: 2018-10-09 | End: 2019-08-28 | Stop reason: CLARIF

## 2018-10-08 RX ORDER — ATORVASTATIN CALCIUM 40 MG/1
40 TABLET, FILM COATED ORAL NIGHTLY
Status: DISCONTINUED | OUTPATIENT
Start: 2018-10-08 | End: 2018-10-11 | Stop reason: HOSPADM

## 2018-10-08 RX ORDER — LAMOTRIGINE 100 MG/1
100 TABLET ORAL DAILY
Status: DISCONTINUED | OUTPATIENT
Start: 2018-10-09 | End: 2018-10-11 | Stop reason: HOSPADM

## 2018-10-08 RX ORDER — SODIUM CHLORIDE 9 MG/ML
INJECTION, SOLUTION INTRAVENOUS
Status: DISPENSED
Start: 2018-10-08 | End: 2018-10-09

## 2018-10-08 RX ORDER — CLOPIDOGREL BISULFATE 75 MG/1
75 TABLET ORAL DAILY
Qty: 90 TABLET | Refills: 0 | Status: ON HOLD | OUTPATIENT
Start: 2018-10-15 | End: 2018-10-11 | Stop reason: HOSPADM

## 2018-10-08 RX ORDER — DEXTROSE MONOHYDRATE 50 MG/ML
100 INJECTION, SOLUTION INTRAVENOUS PRN
Status: DISCONTINUED | OUTPATIENT
Start: 2018-10-08 | End: 2018-10-11 | Stop reason: HOSPADM

## 2018-10-08 RX ORDER — PANTOPRAZOLE SODIUM 40 MG/1
40 TABLET, DELAYED RELEASE ORAL
Status: DISCONTINUED | OUTPATIENT
Start: 2018-10-09 | End: 2018-10-08 | Stop reason: HOSPADM

## 2018-10-08 RX ORDER — PANTOPRAZOLE SODIUM 40 MG/1
40 TABLET, DELAYED RELEASE ORAL
Status: DISCONTINUED | OUTPATIENT
Start: 2018-10-09 | End: 2018-10-11 | Stop reason: HOSPADM

## 2018-10-08 RX ORDER — LISINOPRIL 5 MG/1
5 TABLET ORAL DAILY
Status: DISCONTINUED | OUTPATIENT
Start: 2018-10-09 | End: 2018-10-08

## 2018-10-08 RX ORDER — METOPROLOL TARTRATE 50 MG/1
50 TABLET, FILM COATED ORAL 2 TIMES DAILY
Status: DISCONTINUED | OUTPATIENT
Start: 2018-10-08 | End: 2018-10-08

## 2018-10-08 RX ORDER — SODIUM CHLORIDE 9 MG/ML
INJECTION, SOLUTION INTRAVENOUS CONTINUOUS
Status: DISCONTINUED | OUTPATIENT
Start: 2018-10-08 | End: 2018-10-08 | Stop reason: HOSPADM

## 2018-10-08 RX ORDER — DEXTROSE MONOHYDRATE 25 G/50ML
12.5 INJECTION, SOLUTION INTRAVENOUS PRN
Status: DISCONTINUED | OUTPATIENT
Start: 2018-10-08 | End: 2018-10-11 | Stop reason: HOSPADM

## 2018-10-08 RX ORDER — ASPIRIN 81 MG/1
81 TABLET ORAL DAILY
Qty: 30 TABLET | Refills: 3 | Status: ON HOLD | OUTPATIENT
Start: 2018-10-15 | End: 2018-10-11

## 2018-10-08 RX ADMIN — SODIUM CHLORIDE: 9 INJECTION, SOLUTION INTRAVENOUS at 18:20

## 2018-10-08 RX ADMIN — PANTOPRAZOLE SODIUM 40 MG: 40 INJECTION, POWDER, FOR SOLUTION INTRAVENOUS at 10:17

## 2018-10-08 RX ADMIN — Medication 10 ML: at 10:17

## 2018-10-08 RX ADMIN — LAMOTRIGINE 100 MG: 100 TABLET ORAL at 10:18

## 2018-10-08 RX ADMIN — METOPROLOL TARTRATE 12.5 MG: 25 TABLET, FILM COATED ORAL at 10:16

## 2018-10-08 ASSESSMENT — PAIN SCALES - GENERAL
PAINLEVEL_OUTOF10: 0

## 2018-10-08 NOTE — ED PROVIDER NOTES
kg/m²     Physical Exam   Constitutional: He is oriented to person, place, and time. He appears well-developed and well-nourished. HENT:   Head: Normocephalic and atraumatic. Mouth/Throat: Oropharynx is clear and moist and mucous membranes are normal. No posterior oropharyngeal edema or posterior oropharyngeal erythema. Eyes: Pupils are equal, round, and reactive to light. Conjunctivae and EOM are normal.   Neck: Normal range of motion. Neck supple. Cardiovascular: Normal rate, regular rhythm, normal heart sounds and intact distal pulses. Pulmonary/Chest: Effort normal and breath sounds normal. No respiratory distress. He has no wheezes. He has no rales. Abdominal: Soft. Bowel sounds are normal. He exhibits no distension. There is no tenderness. There is no rigidity, no rebound and no guarding. Genitourinary:   Genitourinary Comments: Scant amount bright red blood at skin surrounding rectum, no active bleeding. Musculoskeletal: Normal range of motion. Neurological: He is alert and oriented to person, place, and time. He has normal strength. No cranial nerve deficit or sensory deficit. He exhibits normal muscle tone. Coordination normal. GCS eye subscore is 4. GCS verbal subscore is 5. GCS motor subscore is 6. Skin: Skin is warm and dry. There is pallor. Psychiatric: He has a normal mood and affect. His speech is normal and behavior is normal. Thought content normal. Cognition and memory are normal.   Vitals reviewed.       Procedures    MDM  Number of Diagnoses or Management Options  Gastrointestinal hemorrhage, unspecified gastrointestinal hemorrhage type:      Amount and/or Complexity of Data Reviewed  Clinical lab tests: ordered and reviewed  Review and summarize past medical records: yes  Discuss the patient with other providers: yes      Results for orders placed or performed during the hospital encounter of 10/08/18   CBC Auto Differential   Result Value Ref Range    WBC 8.1 4.0 - 11.0

## 2018-10-09 ENCOUNTER — APPOINTMENT (OUTPATIENT)
Dept: CT IMAGING | Age: 48
DRG: 356 | End: 2018-10-09
Attending: INTERNAL MEDICINE
Payer: MEDICARE

## 2018-10-09 LAB
ABO/RH: NORMAL
ANION GAP SERPL CALCULATED.3IONS-SCNC: 6 MMOL/L (ref 3–16)
ANTIBODY SCREEN: NORMAL
BUN BLDV-MCNC: 10 MG/DL (ref 7–20)
CALCIUM SERPL-MCNC: 8.6 MG/DL (ref 8.3–10.6)
CHLORIDE BLD-SCNC: 110 MMOL/L (ref 99–110)
CO2: 28 MMOL/L (ref 21–32)
CREAT SERPL-MCNC: 0.8 MG/DL (ref 0.9–1.3)
GFR AFRICAN AMERICAN: >60
GFR NON-AFRICAN AMERICAN: >60
GLUCOSE BLD-MCNC: 100 MG/DL (ref 70–99)
GLUCOSE BLD-MCNC: 118 MG/DL (ref 70–99)
GLUCOSE BLD-MCNC: 141 MG/DL (ref 70–99)
GLUCOSE BLD-MCNC: 85 MG/DL (ref 70–99)
HCT VFR BLD CALC: 24.9 % (ref 40.5–52.5)
HCT VFR BLD CALC: 27.2 % (ref 40.5–52.5)
HCT VFR BLD CALC: 27.3 % (ref 40.5–52.5)
HEMOGLOBIN: 8.6 G/DL (ref 13.5–17.5)
HEMOGLOBIN: 9 G/DL (ref 13.5–17.5)
HEMOGLOBIN: 9.1 G/DL (ref 13.5–17.5)
MCH RBC QN AUTO: 29.9 PG (ref 26–34)
MCHC RBC AUTO-ENTMCNC: 34.3 G/DL (ref 31–36)
MCV RBC AUTO: 87.2 FL (ref 80–100)
PDW BLD-RTO: 14.4 % (ref 12.4–15.4)
PERFORMED ON: ABNORMAL
PERFORMED ON: ABNORMAL
PERFORMED ON: NORMAL
PLATELET # BLD: 256 K/UL (ref 135–450)
PMV BLD AUTO: 7.5 FL (ref 5–10.5)
POTASSIUM SERPL-SCNC: 4 MMOL/L (ref 3.5–5.1)
RBC # BLD: 2.86 M/UL (ref 4.2–5.9)
SODIUM BLD-SCNC: 144 MMOL/L (ref 136–145)
WBC # BLD: 6.8 K/UL (ref 4–11)

## 2018-10-09 PROCEDURE — 99222 1ST HOSP IP/OBS MODERATE 55: CPT | Performed by: SURGERY

## 2018-10-09 PROCEDURE — 6360000004 HC RX CONTRAST MEDICATION: Performed by: INTERNAL MEDICINE

## 2018-10-09 PROCEDURE — 2000000000 HC ICU R&B

## 2018-10-09 PROCEDURE — 86923 COMPATIBILITY TEST ELECTRIC: CPT

## 2018-10-09 PROCEDURE — 85018 HEMOGLOBIN: CPT

## 2018-10-09 PROCEDURE — P9016 RBC LEUKOCYTES REDUCED: HCPCS

## 2018-10-09 PROCEDURE — 86901 BLOOD TYPING SEROLOGIC RH(D): CPT

## 2018-10-09 PROCEDURE — 99222 1ST HOSP IP/OBS MODERATE 55: CPT | Performed by: INTERNAL MEDICINE

## 2018-10-09 PROCEDURE — 86850 RBC ANTIBODY SCREEN: CPT

## 2018-10-09 PROCEDURE — 74174 CTA ABD&PLVS W/CONTRAST: CPT

## 2018-10-09 PROCEDURE — 2580000003 HC RX 258: Performed by: INTERNAL MEDICINE

## 2018-10-09 PROCEDURE — 80048 BASIC METABOLIC PNL TOTAL CA: CPT

## 2018-10-09 PROCEDURE — 6370000000 HC RX 637 (ALT 250 FOR IP): Performed by: INTERNAL MEDICINE

## 2018-10-09 PROCEDURE — 36430 TRANSFUSION BLD/BLD COMPNT: CPT

## 2018-10-09 PROCEDURE — 85027 COMPLETE CBC AUTOMATED: CPT

## 2018-10-09 PROCEDURE — 85014 HEMATOCRIT: CPT

## 2018-10-09 PROCEDURE — 36415 COLL VENOUS BLD VENIPUNCTURE: CPT

## 2018-10-09 PROCEDURE — 86900 BLOOD TYPING SEROLOGIC ABO: CPT

## 2018-10-09 RX ORDER — SODIUM CHLORIDE 9 MG/ML
INJECTION, SOLUTION INTRAVENOUS CONTINUOUS
Status: DISCONTINUED | OUTPATIENT
Start: 2018-10-09 | End: 2018-10-10

## 2018-10-09 RX ORDER — 0.9 % SODIUM CHLORIDE 0.9 %
250 INTRAVENOUS SOLUTION INTRAVENOUS ONCE
Status: COMPLETED | OUTPATIENT
Start: 2018-10-09 | End: 2018-10-09

## 2018-10-09 RX ADMIN — SODIUM CHLORIDE 250 ML: 9 INJECTION, SOLUTION INTRAVENOUS at 06:00

## 2018-10-09 RX ADMIN — SODIUM CHLORIDE: 9 INJECTION, SOLUTION INTRAVENOUS at 00:32

## 2018-10-09 RX ADMIN — INSULIN GLARGINE 14 UNITS: 100 INJECTION, SOLUTION SUBCUTANEOUS at 09:22

## 2018-10-09 RX ADMIN — LEVOTHYROXINE SODIUM 150 MCG: 25 TABLET ORAL at 20:18

## 2018-10-09 RX ADMIN — IOPAMIDOL: 755 INJECTION, SOLUTION INTRAVENOUS at 02:25

## 2018-10-09 RX ADMIN — DESMOPRESSIN ACETATE 40 MG: 0.2 TABLET ORAL at 20:18

## 2018-10-09 RX ADMIN — PANTOPRAZOLE SODIUM 40 MG: 40 TABLET, DELAYED RELEASE ORAL at 05:55

## 2018-10-09 ASSESSMENT — PAIN SCALES - GENERAL
PAINLEVEL_OUTOF10: 0

## 2018-10-09 ASSESSMENT — ENCOUNTER SYMPTOMS
SHORTNESS OF BREATH: 0
ANAL BLEEDING: 1
VOMITING: 0
ABDOMINAL PAIN: 0
HEMATOCHEZIA: 1

## 2018-10-09 NOTE — CONSULTS
ARTERY BYPASS GRAFTING X3, INTERNAL MAMMARY ARTERY, SAPHENOUS VEIN GRAFT, ON PUMP performed by Mauro Guardado MD at [facility] 10/3/2018    EGD DIAGNOSTIC ONLY performed by Kellee Radford MD at [address], [facility] History:     · Tobacco use:   reports that he has quit smoking. His smoking use included Cigars. He has never used smokeless tobacco.  · Alcohol use:   reports that he does not drink alcohol. · Currently lives in: [address] [place] [address]  ·  reports that he does not use drugs. Family History:   Family History   Problem Relation Age of Onset    Cancer Mother         Pancreatic    Other Father     Diabetes Brother     High Blood Pressure Brother     High Cholesterol Brother        Medications:    sodium chloride  250 mL Intravenous Once    atorvastatin  40 mg Oral Nightly    folic acid  1 mg Oral Daily    insulin glargine  14 Units Subcutaneous QAM    lamoTRIgine  100 mg Oral Daily    levothyroxine  150 mcg Oral Nightly    pantoprazole  40 mg Oral QAM AC    insulin lispro  8 Units Subcutaneous TID WC            REVIEW OF SYSTEMS:       A comprehensive review of systems was negative. Objective:   PHYSICAL EXAM:      Vitals:   Vitals:    10/09/18 0800 10/09/18 0820 10/09/18 0830 10/09/18 0925   BP: 121/73 118/71 122/70 126/71   Pulse: 52 53 54 56   Resp: 12 10 8 9   Temp: 97.9 °F (36.6 °C) 98 °F (36.7 °C) 98 °F (36.7 °C) 98.1 °F (36.7 °C)   TempSrc: Temporal Temporal Temporal Temporal   SpO2: 96% 95% 99% 99%   Weight:       Height:           General appearance: alert, cooperative, no distress, appears stated age  Eyes: Anicteric  Head: Normocephalic, without obvious abnormality  Lungs: clear to auscultation bilaterally, Normal Effort  Heart: regular rate and rhythm, normal S1 and S2, no murmurs or rubs  Abdomen: soft, non-tender. Bowel sounds normal. No masses,  no organomegaly.    Extremities: atraumatic, no cyanosis or edema  Skin: warm

## 2018-10-09 NOTE — PROGRESS NOTES
Pt back from CT. Placed on bedside tele monitor. VSS. Pt tolerated procedure well. Repositioned for comfort. Fall and safety precautions in place. Pt stated needs met at this time. Will continue to monitor.  Ariel Min RN, BSN

## 2018-10-09 NOTE — CONSULTS
distress, and appears stated age  NECK:  Supple, symmetrical, trachea midline, no adenopathy, thyroid symmetric, not enlarged and no tenderness, skin normal  LUNGS:  no increased work of breathing and clear to auscultation. No accessory muscle use  CARDIOVASCULAR: S1 and S2, no edema and no JVD  ABDOMEN:  normal bowel sounds, non-distended and no masses palpated, and no tenderness to palpation. No hepatospleenomegaly  LYMPHADENOPATHY:  no axillary or supraclavicular adenopathy. No cervical adnenopathy  PSYCHIATRIC: Oriented to person place and time. No obvious depression or anxiety. MUSCULOSKELETAL: No obvious misalignment or effusion of the joints. No clubbing, cyanosis of the digits. SKIN:  normal skin color, texture, turgor and no redness, warmth, or swelling. No palpable nodules    DATA:    Old records have been reviewed    CBC:  Recent Labs      10/08/18   1614   10/08/18   2225  10/09/18   0429  10/09/18   1236   WBC  8.1   --   9.2  6.8   --    RBC  3.52*   --   3.03*  2.86*   --    HGB  10.3*   < >  9.0*  8.6*  9.1*   HCT  30.7*   < >  27.3*  24.9*  27.3*   PLT  324   --   205  256   --    MCV  87.3   --   89.8  87.2   --    MCH  29.2   --   29.6  29.9   --    MCHC  33.5   --   32.9  34.3   --    RDW  14.7   --   14.3  14.4   --    BANDSPCT  2   --    --    --    --     < > = values in this interval not displayed. BMP:  Recent Labs      10/08/18   0630  10/08/18   1614  10/09/18   0429   NA  145  143  144   K  3.9  3.9  4.0   CL  111*  106  110   CO2  28  26  28   BUN  7  7  10   CREATININE  0.7*  0.9  0.8*   CALCIUM  8.8  9.0  8.6   GLUCOSE  144*  130*  118*      ABG:  No results for input(s): PHART, ZHU1QFH, PO2ART, CXO7SWS, J7VGSGHS, BEART in the last 72 hours. No results found for: BNP  Lab Results   Component Value Date    TROPONINI <0.01 07/23/2018       Cultures:     Abx:    Radiology Review:  All pertinent images / reports were reviewed as a part of this visit.    CT Chest w/ contrast: No had no bowel movement until yesterday p.m. Postembolization, noted to have bright red blood. ? Small residual bleed. Hemoglobin has remained stable, not requiring blood transfusion. Hemodynamically stable. Closely monitor for one day with every 6 hourly H&H. No plans for intervention as per GI. Okay for transfer out of ICU. Critical care team will sign off.       Dajuan Nicole MD

## 2018-10-09 NOTE — PROGRESS NOTES
Assessment reviewed and no acute changes were noted. Patient allowed to have clear liquids and has been tolerating them without issues. Denies any pain or BM urgency. Will continue to monitor.

## 2018-10-09 NOTE — CARE COORDINATION
Discharge planning-    Patient is known to social work from patient's previous admission. Patient was activewith home care services through [facility] prior to admission. Called [facility], spoke with Clare Cobb in intake. Notified of patient readmission. Facesheet and H&P sent via fax. Will speak with the patient, as schedule allows, to find out if there are any additional discharge planning needs. Plan- Home with [facility].     Will continue to follow for support and discharge planning.    -Suresh Calderon MSW, LSW

## 2018-10-10 LAB
ANION GAP SERPL CALCULATED.3IONS-SCNC: 7 MMOL/L (ref 3–16)
BUN BLDV-MCNC: 7 MG/DL (ref 7–20)
CALCIUM SERPL-MCNC: 8.5 MG/DL (ref 8.3–10.6)
CHLORIDE BLD-SCNC: 114 MMOL/L (ref 99–110)
CO2: 27 MMOL/L (ref 21–32)
CREAT SERPL-MCNC: 0.8 MG/DL (ref 0.9–1.3)
GFR AFRICAN AMERICAN: >60
GFR NON-AFRICAN AMERICAN: >60
GLUCOSE BLD-MCNC: 138 MG/DL (ref 70–99)
GLUCOSE BLD-MCNC: 80 MG/DL (ref 70–99)
GLUCOSE BLD-MCNC: 90 MG/DL (ref 70–99)
GLUCOSE BLD-MCNC: 97 MG/DL (ref 70–99)
HCT VFR BLD CALC: 27.2 % (ref 40.5–52.5)
HCT VFR BLD CALC: 28.9 % (ref 40.5–52.5)
HEMOGLOBIN: 9.2 G/DL (ref 13.5–17.5)
HEMOGLOBIN: 9.5 G/DL (ref 13.5–17.5)
MCH RBC QN AUTO: 28.8 PG (ref 26–34)
MCHC RBC AUTO-ENTMCNC: 32.8 G/DL (ref 31–36)
MCV RBC AUTO: 87.6 FL (ref 80–100)
PDW BLD-RTO: 14.5 % (ref 12.4–15.4)
PERFORMED ON: ABNORMAL
PERFORMED ON: NORMAL
PERFORMED ON: NORMAL
PLATELET # BLD: 288 K/UL (ref 135–450)
PMV BLD AUTO: 7.3 FL (ref 5–10.5)
POTASSIUM REFLEX MAGNESIUM: 4.3 MMOL/L (ref 3.5–5.1)
RBC # BLD: 3.3 M/UL (ref 4.2–5.9)
SODIUM BLD-SCNC: 148 MMOL/L (ref 136–145)
WBC # BLD: 6.5 K/UL (ref 4–11)

## 2018-10-10 PROCEDURE — 6370000000 HC RX 637 (ALT 250 FOR IP): Performed by: INTERNAL MEDICINE

## 2018-10-10 PROCEDURE — 85027 COMPLETE CBC AUTOMATED: CPT

## 2018-10-10 PROCEDURE — APPSS15 APP SPLIT SHARED TIME 0-15 MINUTES: Performed by: NURSE PRACTITIONER

## 2018-10-10 PROCEDURE — 2580000003 HC RX 258: Performed by: INTERNAL MEDICINE

## 2018-10-10 PROCEDURE — 36415 COLL VENOUS BLD VENIPUNCTURE: CPT

## 2018-10-10 PROCEDURE — 80048 BASIC METABOLIC PNL TOTAL CA: CPT

## 2018-10-10 PROCEDURE — APPNB30 APP NON BILLABLE TIME 0-30 MINS: Performed by: NURSE PRACTITIONER

## 2018-10-10 PROCEDURE — 99232 SBSQ HOSP IP/OBS MODERATE 35: CPT | Performed by: SURGERY

## 2018-10-10 PROCEDURE — 2000000000 HC ICU R&B

## 2018-10-10 PROCEDURE — 85018 HEMOGLOBIN: CPT

## 2018-10-10 PROCEDURE — 85014 HEMATOCRIT: CPT

## 2018-10-10 RX ADMIN — INSULIN GLARGINE 14 UNITS: 100 INJECTION, SOLUTION SUBCUTANEOUS at 09:04

## 2018-10-10 RX ADMIN — INSULIN LISPRO 8 UNITS: 100 INJECTION, SOLUTION INTRAVENOUS; SUBCUTANEOUS at 17:23

## 2018-10-10 RX ADMIN — SODIUM CHLORIDE: 9 INJECTION, SOLUTION INTRAVENOUS at 05:58

## 2018-10-10 RX ADMIN — INSULIN LISPRO 8 UNITS: 100 INJECTION, SOLUTION INTRAVENOUS; SUBCUTANEOUS at 13:09

## 2018-10-10 RX ADMIN — DESMOPRESSIN ACETATE 40 MG: 0.2 TABLET ORAL at 23:34

## 2018-10-10 RX ADMIN — LAMOTRIGINE 100 MG: 100 TABLET ORAL at 09:01

## 2018-10-10 RX ADMIN — LEVOTHYROXINE SODIUM 150 MCG: 25 TABLET ORAL at 23:34

## 2018-10-10 RX ADMIN — PANTOPRAZOLE SODIUM 40 MG: 40 TABLET, DELAYED RELEASE ORAL at 07:26

## 2018-10-10 RX ADMIN — FOLIC ACID 1 MG: 1 TABLET ORAL at 09:01

## 2018-10-10 RX ADMIN — INSULIN LISPRO 8 UNITS: 100 INJECTION, SOLUTION INTRAVENOUS; SUBCUTANEOUS at 09:03

## 2018-10-10 ASSESSMENT — PAIN SCALES - GENERAL
PAINLEVEL_OUTOF10: 0

## 2018-10-10 NOTE — PROGRESS NOTES
[facility]  Gastroenterology Progress Note    Catalina Epps is a 53 y.o. male patient. No diagnosis found. SUBJECTIVE:    Doing well   No recurrent GIB or abdominal pain     ROS:  Cardiovascular ROS: no chest pain or dyspnea on exertion  Gastrointestinal ROS: see above  Respiratory ROS: no cough, shortness of breath, or wheezing    Physical    VITALS:  BP (!) 151/82   Pulse 80   Temp 99.1 °F (37.3 °C) (Temporal)   Resp 14   Ht 6' (1.829 m)   Wt 190 lb 14.7 oz (86.6 kg)   SpO2 96%   BMI 25.89 kg/m²   TEMPERATURE:  Current - Temp: 99.1 °F (37.3 °C); Max - Temp  Av.5 °F (36.9 °C)  Min: 98.1 °F (36.7 °C)  Max: 99.1 °F (37.3 °C)    NAD  RRR, Nl s1s2  Lungs CTA Bilaterally, normal effort  Abdomen soft, ND, NT, no HSM, Bowel sounds normal  AAOx3, No asterixis     Data      CBC:   Recent Labs      10/08/18   1614   10/08/18   2225  10/09/18   0429   10/09/18   1834  10/10/18   0018  10/10/18   0642   WBC  8.1   --   9.2  6.8   --    --    --   6.5   RBC  3.52*   --   3.03*  2.86*   --    --    --   3.30*   HGB  10.3*   < >  9.0*  8.6*   < >  9.0*  9.2*  9.5*   HCT  30.7*   < >  27.3*  24.9*   < >  27.2*  27.2*  28.9*   PLT  324   --   205  256   --    --    --   288   MCV  87.3   --   89.8  87.2   --    --    --   87.6   MCH  29.2   --   29.6  29.9   --    --    --   28.8   MCHC  33.5   --   32.9  34.3   --    --    --   32.8   RDW  14.7   --   14.3  14.4   --    --    --   14.5   BANDSPCT  2   --    --    --    --    --    --    --     < > = values in this interval not displayed.         BMP:  Recent Labs      10/08/18   1614  10/09/18   0429  10/10/18   0642   NA  143  144  148*   K  3.9  4.0  4.3   CL  106  110  114*   CO2  26  28  27   BUN  7  10  7   CREATININE  0.9  0.8*  0.8*   CALCIUM  9.0  8.6  8.5   GLUCOSE  130*  118*  90        Hepatic Function Panel:   Lab Results   Component Value Date    ALKPHOS 59 10/08/2018    ALT 11 10/08/2018    AST 15 10/08/2018    PROT 5.7 10/08/2018    PROT 7.1 01/13/2012    BILITOT <0.2 10/08/2018    LABALBU 3.1 10/08/2018       Recent Labs      10/08/18   1614   LIPASE  81.0*     Recent Labs      10/08/18   1614   PROTIME  12.3   INR  1.08        Assessment:   The patient is a 53 y.o. old male  with following problems:     Principal Problem:    Lower GI bleed  Active Problems:    Essential hypertension    Diabetes mellitus (HCC)    Bipolar affective ([address]    CAD in native artery    Acute blood loss anemia  Resolved Problems:    * No resolved hospital problems. *     Recurrent rectal bleeding. He had a recent large diverticular bleeding from the hepatic flexure that required IR embolization on 10/3. This recurrent bleeding after discharge could be ischemic in nature (from IR coiling) vs. Persistent diverticular bleed. Hb remains stable.      Recommendations:   Monitor H/H. If no further bleeding and Hb stable tomorrow, advance diet as tolerated   We will only do a colonoscopy if the rectal bleeding is persistent. Otherwise, we will plan for outpatient colonoscopy in 2-3 months. May transfer out of ICU      Minimize antiplatelet drugs - ASA vs plavix. Will leave decision with cardiology. May resume tomorrow.      Anticipate discharge in 1-2 days     Iris Mason MD, MSc  [address] [address]

## 2018-10-10 NOTE — PROGRESS NOTES
480 [P.O.:480]  Out: 1450 [Urine:1450]  Drain/tube Output:       Physical Exam:  General: awake, alert, oriented to  person, place, time  Lungs: clear to auscultation  Abdomen: soft, nontender, nondistended, no masses or organomegaly   SKIN:  no bruising or bleeding and normal skin color, texture, turgor      Labs:  CBC:    Recent Labs      10/08/18   2225  10/09/18   0429   10/09/18   1834  10/10/18   0018  10/10/18   0642   WBC  9.2  6.8   --    --    --   6.5   HGB  9.0*  8.6*   < >  9.0*  9.2*  9.5*   HCT  27.3*  24.9*   < >  27.2*  27.2*  28.9*   PLT  205  256   --    --    --   288    < > = values in this interval not displayed. BMP:  Recent Labs      10/08/18   1614  10/09/18   0429  10/10/18   0642   NA  143  144  148*   K  3.9  4.0  4.3   CL  106  110  114*   CO2  26  28  27   BUN  7  10  7   CREATININE  0.9  0.8*  0.8*   GLUCOSE  130*  118*  90     Hepatic: Recent Labs      10/08/18   1614   AST  15   ALT  11   BILITOT  <0.2   ALKPHOS  59     Amylase: No results for input(s): AMYLASE in the last 72 hours. Lipase:   Recent Labs      10/08/18   1614   LIPASE  81.0*     Mag:    No results for input(s): MG in the last 72 hours. Phos:   No results for input(s): PHOS in the last 72 hours. Coags:   Recent Labs      10/08/18   1614   INR  1.08   APTT  33.9       Cultures:  Anaerobic culture  No results for input(s): LABANAE in the last 72 hours. Blood culture  No results for input(s): BC in the last 72 hours. Blood culture 2  No results for input(s): BLOODCULT2 in the last 72 hours. Body fluid culture  No results for input(s): BLOODCULT2 in the last 72 hours. Surgical culture  No results for input(s): CXSURG in the last 72 hours. Fecal occult  No results for input(s): OCCULTBLDFEC in the last 72 hours. Gram stain  No results for input(s): LABGRAM in the last 72 hours. Stool culture 1  No results for input(s): CXST in the last 72 hours.     Stool culture 2  No results for input(s):

## 2018-10-10 NOTE — PROGRESS NOTES
Shift assessment completed, see flow sheets. Meds given, see MAR. Pulses present, VSS, no signs of bleeding. Pt in bed, denies need at this time.

## 2018-10-11 VITALS
OXYGEN SATURATION: 96 % | BODY MASS INDEX: 25.05 KG/M2 | HEART RATE: 76 BPM | TEMPERATURE: 97.4 F | RESPIRATION RATE: 12 BRPM | DIASTOLIC BLOOD PRESSURE: 85 MMHG | HEIGHT: 72 IN | SYSTOLIC BLOOD PRESSURE: 139 MMHG | WEIGHT: 184.97 LBS

## 2018-10-11 LAB
ANION GAP SERPL CALCULATED.3IONS-SCNC: 10 MMOL/L (ref 3–16)
BUN BLDV-MCNC: 8 MG/DL (ref 7–20)
CALCIUM SERPL-MCNC: 9.4 MG/DL (ref 8.3–10.6)
CHLORIDE BLD-SCNC: 110 MMOL/L (ref 99–110)
CO2: 25 MMOL/L (ref 21–32)
CREAT SERPL-MCNC: 0.9 MG/DL (ref 0.9–1.3)
GFR AFRICAN AMERICAN: >60
GFR NON-AFRICAN AMERICAN: >60
GLUCOSE BLD-MCNC: 107 MG/DL (ref 70–99)
GLUCOSE BLD-MCNC: 122 MG/DL (ref 70–99)
GLUCOSE BLD-MCNC: 160 MG/DL (ref 70–99)
HCT VFR BLD CALC: 31.7 % (ref 40.5–52.5)
HEMOGLOBIN: 10.5 G/DL (ref 13.5–17.5)
MCH RBC QN AUTO: 29.1 PG (ref 26–34)
MCHC RBC AUTO-ENTMCNC: 33.1 G/DL (ref 31–36)
MCV RBC AUTO: 88 FL (ref 80–100)
PDW BLD-RTO: 14.6 % (ref 12.4–15.4)
PERFORMED ON: ABNORMAL
PERFORMED ON: ABNORMAL
PLATELET # BLD: 341 K/UL (ref 135–450)
PMV BLD AUTO: 7 FL (ref 5–10.5)
POTASSIUM REFLEX MAGNESIUM: 4.2 MMOL/L (ref 3.5–5.1)
RBC # BLD: 3.61 M/UL (ref 4.2–5.9)
SODIUM BLD-SCNC: 145 MMOL/L (ref 136–145)
WBC # BLD: 6.6 K/UL (ref 4–11)

## 2018-10-11 PROCEDURE — 6370000000 HC RX 637 (ALT 250 FOR IP): Performed by: INTERNAL MEDICINE

## 2018-10-11 PROCEDURE — 36415 COLL VENOUS BLD VENIPUNCTURE: CPT

## 2018-10-11 PROCEDURE — APPSS15 APP SPLIT SHARED TIME 0-15 MINUTES: Performed by: NURSE PRACTITIONER

## 2018-10-11 PROCEDURE — 85027 COMPLETE CBC AUTOMATED: CPT

## 2018-10-11 PROCEDURE — 80048 BASIC METABOLIC PNL TOTAL CA: CPT

## 2018-10-11 PROCEDURE — 99232 SBSQ HOSP IP/OBS MODERATE 35: CPT | Performed by: SURGERY

## 2018-10-11 PROCEDURE — APPNB30 APP NON BILLABLE TIME 0-30 MINS: Performed by: NURSE PRACTITIONER

## 2018-10-11 RX ORDER — METOPROLOL TARTRATE 50 MG/1
25 TABLET, FILM COATED ORAL 2 TIMES DAILY
Qty: 60 TABLET | Refills: 3 | Status: SHIPPED | OUTPATIENT
Start: 2018-10-11 | End: 2019-08-28 | Stop reason: CLARIF

## 2018-10-11 RX ORDER — ASPIRIN 81 MG/1
81 TABLET ORAL DAILY
Qty: 30 TABLET | Refills: 3 | Status: SHIPPED | OUTPATIENT
Start: 2018-10-15

## 2018-10-11 RX ADMIN — FOLIC ACID 1 MG: 1 TABLET ORAL at 10:26

## 2018-10-11 RX ADMIN — INSULIN LISPRO 8 UNITS: 100 INJECTION, SOLUTION INTRAVENOUS; SUBCUTANEOUS at 10:31

## 2018-10-11 RX ADMIN — PANTOPRAZOLE SODIUM 40 MG: 40 TABLET, DELAYED RELEASE ORAL at 06:42

## 2018-10-11 RX ADMIN — INSULIN GLARGINE 14 UNITS: 100 INJECTION, SOLUTION SUBCUTANEOUS at 10:31

## 2018-10-11 RX ADMIN — LAMOTRIGINE 100 MG: 100 TABLET ORAL at 10:26

## 2018-10-11 ASSESSMENT — PAIN SCALES - GENERAL
PAINLEVEL_OUTOF10: 0

## 2018-10-11 NOTE — DISCHARGE SUMMARY
loss anemia secondary to acute diverticular hemorrhage status post a distal branch superior mesenteric artery coil and Gelfoam embolization. During that admission he received 19 units of platelet by several transfusions resuscitation. He denies any abdominal pains. He had a bowel motion around 4 PM which was bloody and presented to the ER. Also workup done initially showed hemoglobin of 10.3, and repeat hemoglobin to us later was 9.1. He was transferred here for further management. [facility]. Patient readmitted with BRBPR, monitored in the hospital, was given a unit of blood. Monitored for couple of days, did not see any other bleeding. GI and surgery evaluated the patient. Recurrence either ischemic(after coiling) or persistent diverticular bleed. On discharge, hold plavix and resume aspirin from coming Monday. Given bradycardia on admission, did cut down the beta blocker dose to half. Discharge home with home services. Acute blood loss anemia sec to lower GI bleeding diverticular with recent coli and gelfoam embolization of SMA branch  Serial H&H, keep Hb above 8 given h/o CAD  Advance diet to full liquid, hold asa and plavix, on discharge probably hold asa for a week and plavix indefinitely      Hypernatremia and hyperchloremia  Stop Normal saline, recheck labs in am, improved      Bradycardia   Holding beta blocker  Cardiology input regarding medication adjustment   Consults. IP CONSULT TO GI  IP CONSULT TO GENERAL SURGERY  IP CONSULT TO CARDIOLOGY  IP CONSULT TO HOME CARE NEEDS    Physical examination on discharge day. /85   Pulse 76   Temp 97.4 °F (36.3 °C) (Temporal)   Resp 12   Ht 6' (1.829 m)   Wt 184 lb 15.5 oz (83.9 kg)   SpO2 96%   BMI 25.09 kg/m²   General appearance. Alert. Looks comfortable. HEENT. Sclera clear. Moist mucus membranes. Cardiovascular. Regular rate and rhythm, normal S1, S2. No murmur. Respiratory.  Not using accessory muscles. Clear to auscultation bilaterally, no wheeze. Gastrointestinal. Abdomen soft, non-tender, not distended, normal bowel sounds  Neurology. Facial symmetry. No speech deficits. Moving all extremities equally. Extremities. No edema in lower extremities. Skin. Warm, dry, normal turgor        Condition at time of discharge stable    Medication instructions provided to patient at discharge. Medication List      CHANGE how you take these medications    aspirin 81 MG EC tablet  Take 1 tablet by mouth daily  Start taking on:  10/15/2018  What changed:  · additional instructions  · These instructions start on 10/15/2018. If you are unsure what to do until then, ask your doctor or other care provider. lamoTRIgine 25 MG tablet  Commonly known as:  LAMICTAL  Take 3 tablets PO daily. Follow up with MD for further titrations.   What changed:  · how much to take  · how to take this  · when to take this  · additional instructions     lisinopril 5 MG tablet  Commonly known as:  PRINIVIL;ZESTRIL  Take 1 tablet by mouth daily  What changed:  how much to take     metoprolol tartrate 50 MG tablet  Commonly known as:  LOPRESSOR  Take 0.5 tablets by mouth 2 times daily  What changed:  how much to take        CONTINUE taking these medications    atorvastatin 40 MG tablet  Commonly known as:  LIPITOR  Take 1 tablet by mouth nightly     b complex vitamins capsule     BASAGLAR KWIKPEN 100 UNIT/ML injection pen  Generic drug:  insulin glargine     folic acid 1 MG tablet  Commonly known as:  FOLVITE     INVEGA TRINZA 819 MG/2.625ML Susp IM injection  Generic drug:  paliperidone Palmitate     KAZANO 12.5-1000 MG Tabs  Generic drug:  alogliptin-metformin     levothyroxine 150 MCG tablet  Commonly known as:  SYNTHROID     NOVOLOG 100 UNIT/ML injection vial  Generic drug:  insulin aspart     pantoprazole 40 MG tablet  Commonly known as:  PROTONIX  Take 1 tablet by mouth every morning (before breakfast)        STOP taking these

## 2018-10-11 NOTE — PROGRESS NOTES
End of shift report completed. Patient was assessed. See assessment flow sheet. Alert and oriented x 4 but a bit quiet and withdrawn. When assessed, patient voiced he is normally quiet. VSS. No distress noted. Denied pain. No S/S of bleeding noted. Will continue to monitor.

## 2018-10-11 NOTE — PROGRESS NOTES
abscess  No results for input(s): WNDABS in the last 72 hours. Pathology:  NA    Imaging:  I have personally reviewed the following films:    Ct Abdomen Pelvis Wo Contrast Additional Contrast? None    Result Date: 10/3/2018  EXAMINATION: CT OF THE ABDOMEN AND PELVIS WITHOUT CONTRAST 10/3/2018 4:00 am TECHNIQUE: CT of the abdomen and pelvis was performed without the administration of intravenous contrast. Multiplanar reformatted images are provided for review. Dose modulation, iterative reconstruction, and/or weight based adjustment of the mA/kV was utilized to reduce the radiation dose to as low as reasonably achievable. COMPARISON: 07/23/2018 HISTORY: ORDERING SYSTEM PROVIDED HISTORY: abd pain TECHNOLOGIST PROVIDED HISTORY: Additional Contrast?->None Ordering Physician Provided Reason for Exam: Rectal Bleeding (Pt sts \"got blood coming from my rectum, filled the toilet with bright red blood\") Acuity: Acute Type of Exam: Initial FINDINGS: Lower Chest: The visualized lung bases are clear. Organs: The liver, spleen, pancreas, gallbladder, adrenal glands and kidneys are unremarkable given the limitations of the noncontrast technique. GI/Bowel: Small bowel caliber is normal.  The appendix is normal.  There are diverticula throughout the colon with wall thickening in the ascending colon. Pelvis: The bladder is grossly negative. Peritoneum/Retroperitoneum: There is mild fat stranding adjacent to the ascending colon. There is no stranding of the central mesentery. There is no free fluid or adenopathy. Aortic caliber is normal. Bones/Soft Tissues: Unremarkable. Diverticulitis in the ascending colon. Xr Chest Portable    Result Date: 10/6/2018  EXAMINATION: SINGLE XRAY VIEW OF THE CHEST 10/6/2018 5:49 am COMPARISON: 10/05/2018 HISTORY: ORDERING SYSTEM PROVIDED HISTORY: sob TECHNOLOGIST PROVIDED HISTORY: Reason for exam:->sob Follow-up exam FINDINGS: Status post median sternotomy.   Right central venous catheter is stable positioning. No focal consolidation, pleural effusion or pneumothorax. The cardiomediastinal silhouette is stable. No overt pulmonary edema. The osseous structures are stable. Stable exam.  No new acute cardiopulmonary findings. Xr Chest Portable    Result Date: 10/5/2018  EXAMINATION: SINGLE XRAY VIEW OF THE CHEST 10/5/2018 6:36 am COMPARISON: October 4, 2018 HISTORY: ORDERING SYSTEM PROVIDED HISTORY: sob TECHNOLOGIST PROVIDED HISTORY: Reason for exam:->sob Ordering Physician Provided Reason for Exam: sob Acuity: Unknown Type of Exam: Unknown FINDINGS: Cardiac silhouette is upper limits of normal in size. Right IJ catheter with tip projecting in the region of the upper SVC. No pneumothorax. No pleural effusion. No focal consolidation. No acute bony abnormality. No acute findings. Xr Chest Portable    Result Date: 10/4/2018  EXAMINATION: SINGLE XRAY VIEW OF THE CHEST 10/4/2018 9:35 am COMPARISON: Chest 10/03/2018 HISTORY: ORDERING SYSTEM PROVIDED HISTORY: sob TECHNOLOGIST PROVIDED HISTORY: Reason for exam:->sob Ordering Physician Provided Reason for Exam: sob Acuity: Unknown Type of Exam: Unknown FINDINGS: Suboptimal AP lordotic projection with poor inspiration. The cardiomediastinal and hilar silhouettes appear unremarkable. The lungs appear clear. No pleural effusion evident. No pneumothorax is seen. No acute osseous abnormality is identified. Status post CABG. Unchanged right IJ CVC, tip at the mid superior vena cava level. Wires and cardiac leads overlying the chest, predominantly lower right, could obscure an underlying finding. 1. Suboptimal AP lordotic projection with poor inspiration. 2. No definite radiographic evidence of acute cardiopulmonary disease. 3. Previous CABG.      Xr Chest Portable    Result Date: 10/3/2018  EXAMINATION: SINGLE XRAY VIEW OF THE CHEST 10/3/2018 10:53 am COMPARISON: 10/03/2018 HISTORY: ORDERING SYSTEM PROVIDED HISTORY: questionable pneumothorax TECHNOLOGIST PROVIDED HISTORY: Rapid Response Reason for exam:->questionable pneumothorax Ordering Physician Provided Reason for Exam: questionable pneumothorax Acuity: Acute Type of Exam: Initial FINDINGS: Right jugular central venous line remains in place. Status post median sternotomy. No pneumothorax identified. The heart size is stable. No new airspace disease. No pneumothorax identified. Otherwise, stable chest     Xr Chest Portable    Result Date: 10/3/2018  EXAMINATION: SINGLE XRAY VIEW OF THE CHEST 10/3/2018 8:26 am COMPARISON: 08/22/2018 HISTORY: ORDERING SYSTEM PROVIDED HISTORY: CVC placement TECHNOLOGIST PROVIDED HISTORY: Rapid Response Reason for exam:->CVC placement Ordering Physician Provided Reason for Exam: CVC placement Acuity: Acute Type of Exam: Initial FINDINGS: Heart size is normal.  The lung volumes are low bilaterally. No significant atelectasis. No left-sided pneumothorax. Placement of right internal jugular venous catheter with tip in the mid SVC. Thin sliver of lucency laterally in the upper right chest which may represent a very small pneumothorax. Sternal wires and clips from prior CABG present. Placement of right internal jugular central venous catheter with tip in the mid SVC. Tiny lateral pneumothorax. RECOMMENDATION: Critical results were called by Dr. Diomedes Cruz. Herrera Jackson MD to Sophie Oquendo on 10/3/2018 at 10:08. Ir Angiogram Superior Mesenteric    Result Date: 10/4/2018  PROCEDURE: 1. ULTRASOUND-GUIDED ACCESS OF THE RIGHT COMMON FEMORAL ARTERY 2. SELECTIVE SMA CATHETERIZATION WITH ANGIOGRAM 3. SUB SELECTIVE CATHETERIZATION OF THE RIGHT COLIC ARTERY WITH ANGIOGRAM 4. SUB SELECTIVE CATHETERIZATION OF A HIGHER ORDER BRANCH OF THE RIGHT COLIC ARTERY SUPPLYING A DIVERTICULUM OF THE HEPATIC FLEXURE WITH ANGIOGRAM 5. COIL AND GELFOAM EMBOLIZATION OF A HIGHER ORDER BRANCH OF THE RIGHT COLIC ARTERY WHICH DEMONSTRATED ACTIVE HEMORRHAGE 6.  POST EMBOLIZATION Gel-Foam. Post embolization angiography demonstrated an excellent angiographic outcome with no evidence of contrast extravasation. The lazo's crook catheter was then exchanged for a reverse curve catheter. The reverse curve catheter was used to select the inferior mesenteric artery. Angiogram was then performed. Angiography of the inferior mesenteric artery demonstrated no evidence of active contrast extravasation. The catheter was then removed in its entirety. The 5 [place] vascular sheath was sutured in place and covered with a sterile dressing at the request of the ordering service. The patient tolerated the procedure well and was transferred in stable condition. FINDINGS: Active hemorrhage from a diverticula in the hepatic flexure supplied by a higher order branch of the right colic artery, as described above. Post coil and Gelfoam embolization imaging demonstrated an excellent angiographic outcome with no evidence of continued contrast extravasation. Interrogation of the inferior mesenteric artery demonstrated no active contrast extravasation. 1.  Successful coil and Gelfoam embolization of a higher order branch of the right colic artery which demonstrated active hemorrhage into a diverticula of the hepatic flexure, as described above. 2.  Interrogation of the inferior mesenteric artery demonstrated no evidence of active contrast extravasation. 3.  The vascular sheath within the right common femoral artery was sutured in place at the request of the ordering service. Cta Abdomen Pelvis W Wo Contrast    Result Date: 10/10/2018  EXAMINATION: CTA OF THE ABDOMEN AND PELVIS WITH AND WITHOUT CONTRAST 10/9/2018 2:36 am: TECHNIQUE: CTA of the abdomen and pelvis was performed without and with the administration of intravenous contrast. Multiplanar reformatted images are provided for review. MIP images are provided for review.  Dose modulation, iterative reconstruction, and/or weight based adjustment of the

## 2018-10-11 NOTE — PROGRESS NOTES
Recent Labs      10/08/18   1614   LIPASE  81.0*     Recent Labs      10/08/18   1614   PROTIME  12.3   INR  1.08     No results for input(s): PTT in the last 72 hours. No results for input(s): OCCULTBLD in the last 72 hours. Assessment:   The patient is a 53 y.o. old male  with following problems:     Principal Problem:    Lower GI bleed  Active Problems:    Essential hypertension    Diabetes mellitus ([address]    Bipolar affective ([facility] [address]    CAD in native artery    Acute blood loss anemia  Resolved Problems:    * No resolved hospital problems. *     Recurrent rectal bleeding. [facility] had a recent large diverticular bleeding from the hepatic flexure that required IR embolization on 10/3.  This recurrent bleeding after discharge could be ischemic in nature (from IR coiling) vs. Persistent diverticular bleed. No bleeding since admission.  Hb stable       Recommendations:       May transfer out of ICU      May resume antiplatelet medications    Ok to discharge from GI standpoint    GI will sign-off, please call if you have questions       We will plan for outpatient colonoscopy in 2-3 months    Brannon Chacon MD, MSc  [facility] [address]

## 2018-10-11 NOTE — PLAN OF CARE
Problem: Falls - Risk of:  Goal: Absence of physical injury  Absence of physical injury   Outcome: Ongoing      Problem: Risk for Impaired Skin Integrity  Goal: Tissue integrity - skin and mucous membranes  Structural intactness and normal physiological function of skin and  mucous membranes. Outcome: Ongoing      Problem: Discharge Planning:  Goal: Discharged to appropriate level of care  Discharged to appropriate level of care   Outcome: Ongoing      Problem:  Bowel Function - Altered:  Goal: Bowel elimination is within specified parameters  Bowel elimination is within specified parameters   Outcome: Ongoing      Problem: Fluid Volume - Imbalance:  Goal: Will show no signs and symptoms of excessive bleeding  Will show no signs and symptoms of excessive bleeding   Outcome: Ongoing    Goal: Absence of imbalanced fluid volume signs and symptoms  Absence of imbalanced fluid volume signs and symptoms   Outcome: Ongoing

## 2018-10-13 LAB
BLOOD BANK DISPENSE STATUS: NORMAL
BLOOD BANK DISPENSE STATUS: NORMAL
BLOOD BANK PRODUCT CODE: NORMAL
BLOOD BANK PRODUCT CODE: NORMAL
BPU ID: NORMAL
BPU ID: NORMAL
DESCRIPTION BLOOD BANK: NORMAL
DESCRIPTION BLOOD BANK: NORMAL

## 2018-10-26 ENCOUNTER — TELEPHONE (OUTPATIENT)
Dept: CARDIOTHORACIC SURGERY | Age: 48
End: 2018-10-26

## 2018-11-21 ENCOUNTER — TELEPHONE (OUTPATIENT)
Dept: CARDIOTHORACIC SURGERY | Age: 48
End: 2018-11-21

## 2018-12-17 ENCOUNTER — ANESTHESIA EVENT (OUTPATIENT)
Dept: ENDOSCOPY | Age: 48
End: 2018-12-17
Payer: MEDICARE

## 2019-01-07 ENCOUNTER — HOSPITAL ENCOUNTER (OUTPATIENT)
Age: 49
Setting detail: OUTPATIENT SURGERY
Discharge: HOME | End: 2019-01-07
Attending: INTERNAL MEDICINE | Admitting: INTERNAL MEDICINE
Payer: MEDICARE

## 2019-01-07 ENCOUNTER — ANESTHESIA (OUTPATIENT)
Dept: ENDOSCOPY | Age: 49
End: 2019-01-07
Payer: MEDICARE

## 2019-01-07 VITALS
DIASTOLIC BLOOD PRESSURE: 89 MMHG | RESPIRATION RATE: 18 BRPM | SYSTOLIC BLOOD PRESSURE: 128 MMHG | OXYGEN SATURATION: 100 %

## 2019-01-07 VITALS
HEIGHT: 72 IN | SYSTOLIC BLOOD PRESSURE: 153 MMHG | HEART RATE: 66 BPM | TEMPERATURE: 97 F | OXYGEN SATURATION: 99 % | RESPIRATION RATE: 16 BRPM | DIASTOLIC BLOOD PRESSURE: 90 MMHG | BODY MASS INDEX: 27.09 KG/M2 | WEIGHT: 200 LBS

## 2019-01-07 LAB
GLUCOSE BLD-MCNC: 93 MG/DL (ref 70–99)
GLUCOSE BLD-MCNC: 96 MG/DL (ref 70–99)
PERFORMED ON: NORMAL
PERFORMED ON: NORMAL

## 2019-01-07 PROCEDURE — 3609009500 HC COLONOSCOPY DIAGNOSTIC OR SCREENING: Performed by: INTERNAL MEDICINE

## 2019-01-07 PROCEDURE — 3700000001 HC ADD 15 MINUTES (ANESTHESIA): Performed by: INTERNAL MEDICINE

## 2019-01-07 PROCEDURE — 2580000003 HC RX 258: Performed by: FAMILY MEDICINE

## 2019-01-07 PROCEDURE — 7100000011 HC PHASE II RECOVERY - ADDTL 15 MIN: Performed by: INTERNAL MEDICINE

## 2019-01-07 PROCEDURE — 7100000010 HC PHASE II RECOVERY - FIRST 15 MIN: Performed by: INTERNAL MEDICINE

## 2019-01-07 PROCEDURE — 2500000003 HC RX 250 WO HCPCS: Performed by: NURSE ANESTHETIST, CERTIFIED REGISTERED

## 2019-01-07 PROCEDURE — 2709999900 HC NON-CHARGEABLE SUPPLY: Performed by: INTERNAL MEDICINE

## 2019-01-07 PROCEDURE — 3700000000 HC ANESTHESIA ATTENDED CARE: Performed by: INTERNAL MEDICINE

## 2019-01-07 RX ORDER — SODIUM CHLORIDE 0.9 % (FLUSH) 0.9 %
10 SYRINGE (ML) INJECTION EVERY 12 HOURS SCHEDULED
Status: DISCONTINUED | OUTPATIENT
Start: 2019-01-07 | End: 2019-01-07 | Stop reason: HOSPADM

## 2019-01-07 RX ORDER — SODIUM CHLORIDE 9 MG/ML
INJECTION, SOLUTION INTRAVENOUS CONTINUOUS
Status: DISCONTINUED | OUTPATIENT
Start: 2019-01-07 | End: 2019-01-07 | Stop reason: SDUPTHER

## 2019-01-07 RX ORDER — LIDOCAINE HYDROCHLORIDE 20 MG/ML
INJECTION, SOLUTION INFILTRATION; PERINEURAL PRN
Status: DISCONTINUED | OUTPATIENT
Start: 2019-01-07 | End: 2019-01-07 | Stop reason: SDUPTHER

## 2019-01-07 RX ORDER — SODIUM CHLORIDE 0.9 % (FLUSH) 0.9 %
10 SYRINGE (ML) INJECTION EVERY 12 HOURS SCHEDULED
Status: DISCONTINUED | OUTPATIENT
Start: 2019-01-07 | End: 2019-01-07 | Stop reason: SDUPTHER

## 2019-01-07 RX ORDER — PROPOFOL 10 MG/ML
INJECTION, EMULSION INTRAVENOUS CONTINUOUS PRN
Status: DISCONTINUED | OUTPATIENT
Start: 2019-01-07 | End: 2019-01-07 | Stop reason: SDUPTHER

## 2019-01-07 RX ORDER — SODIUM CHLORIDE 0.9 % (FLUSH) 0.9 %
10 SYRINGE (ML) INJECTION PRN
Status: DISCONTINUED | OUTPATIENT
Start: 2019-01-07 | End: 2019-01-07 | Stop reason: SDUPTHER

## 2019-01-07 RX ORDER — SODIUM CHLORIDE 0.9 % (FLUSH) 0.9 %
10 SYRINGE (ML) INJECTION PRN
Status: DISCONTINUED | OUTPATIENT
Start: 2019-01-07 | End: 2019-01-07 | Stop reason: HOSPADM

## 2019-01-07 RX ORDER — SODIUM CHLORIDE 9 MG/ML
INJECTION, SOLUTION INTRAVENOUS CONTINUOUS
Status: DISCONTINUED | OUTPATIENT
Start: 2019-01-07 | End: 2019-01-07 | Stop reason: HOSPADM

## 2019-01-07 RX ADMIN — LIDOCAINE HYDROCHLORIDE 100 MG: 20 INJECTION, SOLUTION INFILTRATION; PERINEURAL at 09:48

## 2019-01-07 RX ADMIN — PROPOFOL 150 MCG/KG/MIN: 10 INJECTION, EMULSION INTRAVENOUS at 09:48

## 2019-01-07 RX ADMIN — SODIUM CHLORIDE: 9 INJECTION, SOLUTION INTRAVENOUS at 09:02

## 2019-01-07 ASSESSMENT — PAIN - FUNCTIONAL ASSESSMENT: PAIN_FUNCTIONAL_ASSESSMENT: 0-10

## 2019-01-07 ASSESSMENT — PAIN SCALES - GENERAL
PAINLEVEL_OUTOF10: 0

## 2019-01-07 ASSESSMENT — ENCOUNTER SYMPTOMS: SHORTNESS OF BREATH: 0

## 2019-02-22 ENCOUNTER — OFFICE VISIT (OUTPATIENT)
Dept: CARDIOLOGY CLINIC | Age: 49
End: 2019-02-22
Payer: MEDICARE

## 2019-02-22 VITALS
HEART RATE: 63 BPM | DIASTOLIC BLOOD PRESSURE: 78 MMHG | WEIGHT: 214.6 LBS | SYSTOLIC BLOOD PRESSURE: 128 MMHG | OXYGEN SATURATION: 98 % | BODY MASS INDEX: 29.07 KG/M2 | HEIGHT: 72 IN

## 2019-02-22 DIAGNOSIS — I25.10 CAD IN NATIVE ARTERY: Primary | ICD-10-CM

## 2019-02-22 DIAGNOSIS — I10 ESSENTIAL HYPERTENSION: ICD-10-CM

## 2019-02-22 DIAGNOSIS — E78.2 MIXED HYPERLIPIDEMIA: ICD-10-CM

## 2019-02-22 PROCEDURE — G8427 DOCREV CUR MEDS BY ELIG CLIN: HCPCS | Performed by: INTERNAL MEDICINE

## 2019-02-22 PROCEDURE — G8417 CALC BMI ABV UP PARAM F/U: HCPCS | Performed by: INTERNAL MEDICINE

## 2019-02-22 PROCEDURE — 99214 OFFICE O/P EST MOD 30 MIN: CPT | Performed by: INTERNAL MEDICINE

## 2019-02-22 PROCEDURE — 1036F TOBACCO NON-USER: CPT | Performed by: INTERNAL MEDICINE

## 2019-02-22 PROCEDURE — G8484 FLU IMMUNIZE NO ADMIN: HCPCS | Performed by: INTERNAL MEDICINE

## 2019-02-22 PROCEDURE — G8598 ASA/ANTIPLAT THER USED: HCPCS | Performed by: INTERNAL MEDICINE

## 2019-03-27 RX ORDER — LISINOPRIL 10 MG/1
10 TABLET ORAL DAILY
Qty: 90 TABLET | Refills: 1 | Status: SHIPPED | OUTPATIENT
Start: 2019-03-27 | End: 2019-10-07 | Stop reason: SDUPTHER

## 2019-08-28 ENCOUNTER — OFFICE VISIT (OUTPATIENT)
Dept: CARDIOLOGY CLINIC | Age: 49
End: 2019-08-28
Payer: MEDICARE

## 2019-08-28 VITALS
BODY MASS INDEX: 29.93 KG/M2 | SYSTOLIC BLOOD PRESSURE: 132 MMHG | WEIGHT: 221 LBS | HEIGHT: 72 IN | HEART RATE: 93 BPM | OXYGEN SATURATION: 96 % | DIASTOLIC BLOOD PRESSURE: 88 MMHG

## 2019-08-28 DIAGNOSIS — I25.10 CHRONIC TOTAL OCCLUSION OF NATIVE CORONARY ARTERY: ICD-10-CM

## 2019-08-28 DIAGNOSIS — I10 ESSENTIAL HYPERTENSION: ICD-10-CM

## 2019-08-28 DIAGNOSIS — E78.2 MIXED HYPERLIPIDEMIA: ICD-10-CM

## 2019-08-28 DIAGNOSIS — I25.82 CHRONIC TOTAL OCCLUSION OF NATIVE CORONARY ARTERY: ICD-10-CM

## 2019-08-28 DIAGNOSIS — I25.10 CAD IN NATIVE ARTERY: Primary | ICD-10-CM

## 2019-08-28 PROCEDURE — 99213 OFFICE O/P EST LOW 20 MIN: CPT | Performed by: INTERNAL MEDICINE

## 2019-08-28 PROCEDURE — 1036F TOBACCO NON-USER: CPT | Performed by: INTERNAL MEDICINE

## 2019-08-28 PROCEDURE — G8427 DOCREV CUR MEDS BY ELIG CLIN: HCPCS | Performed by: INTERNAL MEDICINE

## 2019-08-28 PROCEDURE — G8417 CALC BMI ABV UP PARAM F/U: HCPCS | Performed by: INTERNAL MEDICINE

## 2019-08-28 PROCEDURE — 93000 ELECTROCARDIOGRAM COMPLETE: CPT | Performed by: INTERNAL MEDICINE

## 2019-08-28 PROCEDURE — G8598 ASA/ANTIPLAT THER USED: HCPCS | Performed by: INTERNAL MEDICINE

## 2019-08-28 RX ORDER — METOPROLOL TARTRATE 50 MG/1
25 TABLET, FILM COATED ORAL 2 TIMES DAILY
Qty: 60 TABLET | Refills: 11 | Status: SHIPPED | OUTPATIENT
Start: 2019-08-28

## 2019-10-08 RX ORDER — LISINOPRIL 10 MG/1
TABLET ORAL
Qty: 90 TABLET | Refills: 3 | Status: SHIPPED | OUTPATIENT
Start: 2019-10-08

## 2021-04-14 ENCOUNTER — HOSPITAL ENCOUNTER (EMERGENCY)
Age: 51
Discharge: PSYCHIATRIC OR SUBSTANCE USE FACILITY | End: 2021-04-15
Attending: EMERGENCY MEDICINE
Payer: MEDICARE

## 2021-04-14 VITALS
TEMPERATURE: 98.4 F | RESPIRATION RATE: 16 BRPM | WEIGHT: 230 LBS | DIASTOLIC BLOOD PRESSURE: 62 MMHG | HEART RATE: 75 BPM | BODY MASS INDEX: 31.15 KG/M2 | OXYGEN SATURATION: 94 % | HEIGHT: 72 IN | SYSTOLIC BLOOD PRESSURE: 105 MMHG

## 2021-04-14 DIAGNOSIS — R45.851 DEPRESSION WITH SUICIDAL IDEATION: Primary | ICD-10-CM

## 2021-04-14 DIAGNOSIS — F32.A DEPRESSION WITH SUICIDAL IDEATION: Primary | ICD-10-CM

## 2021-04-14 LAB
A/G RATIO: 1.7 (ref 1.1–2.2)
ACETAMINOPHEN LEVEL: <5 UG/ML (ref 10–30)
ALBUMIN SERPL-MCNC: 4.7 G/DL (ref 3.4–5)
ALP BLD-CCNC: 67 U/L (ref 40–129)
ALT SERPL-CCNC: 28 U/L (ref 10–40)
AMPHETAMINE SCREEN, URINE: NORMAL
ANION GAP SERPL CALCULATED.3IONS-SCNC: 13 MMOL/L (ref 3–16)
AST SERPL-CCNC: 28 U/L (ref 15–37)
BARBITURATE SCREEN URINE: NORMAL
BASOPHILS ABSOLUTE: 0 K/UL (ref 0–0.2)
BASOPHILS RELATIVE PERCENT: 0.5 %
BENZODIAZEPINE SCREEN, URINE: NORMAL
BILIRUB SERPL-MCNC: 0.8 MG/DL (ref 0–1)
BUN BLDV-MCNC: 18 MG/DL (ref 7–20)
CALCIUM SERPL-MCNC: 10.6 MG/DL (ref 8.3–10.6)
CANNABINOID SCREEN URINE: NORMAL
CHLORIDE BLD-SCNC: 100 MMOL/L (ref 99–110)
CO2: 26 MMOL/L (ref 21–32)
COCAINE METABOLITE SCREEN URINE: NORMAL
CREAT SERPL-MCNC: 1.2 MG/DL (ref 0.9–1.3)
EOSINOPHILS ABSOLUTE: 0 K/UL (ref 0–0.6)
EOSINOPHILS RELATIVE PERCENT: 0.5 %
ETHANOL: NORMAL MG/DL (ref 0–0.08)
GFR AFRICAN AMERICAN: >60
GFR NON-AFRICAN AMERICAN: >60
GLOBULIN: 2.7 G/DL
GLUCOSE BLD-MCNC: 131 MG/DL (ref 70–99)
GLUCOSE BLD-MCNC: 171 MG/DL (ref 70–99)
HCT VFR BLD CALC: 42.8 % (ref 40.5–52.5)
HEMOGLOBIN: 14.4 G/DL (ref 13.5–17.5)
INFLUENZA A: NOT DETECTED
INFLUENZA B: NOT DETECTED
LYMPHOCYTES ABSOLUTE: 1.5 K/UL (ref 1–5.1)
LYMPHOCYTES RELATIVE PERCENT: 16.1 %
Lab: NORMAL
MCH RBC QN AUTO: 29.5 PG (ref 26–34)
MCHC RBC AUTO-ENTMCNC: 33.7 G/DL (ref 31–36)
MCV RBC AUTO: 87.5 FL (ref 80–100)
METHADONE SCREEN, URINE: NORMAL
MONOCYTES ABSOLUTE: 0.4 K/UL (ref 0–1.3)
MONOCYTES RELATIVE PERCENT: 4.1 %
NEUTROPHILS ABSOLUTE: 7.4 K/UL (ref 1.7–7.7)
NEUTROPHILS RELATIVE PERCENT: 78.8 %
OPIATE SCREEN URINE: NORMAL
OXYCODONE URINE: NORMAL
PDW BLD-RTO: 13.5 % (ref 12.4–15.4)
PERFORMED ON: ABNORMAL
PH UA: 5
PHENCYCLIDINE SCREEN URINE: NORMAL
PLATELET # BLD: 224 K/UL (ref 135–450)
PMV BLD AUTO: 8 FL (ref 5–10.5)
POTASSIUM REFLEX MAGNESIUM: 4.2 MMOL/L (ref 3.5–5.1)
PROPOXYPHENE SCREEN: NORMAL
RBC # BLD: 4.89 M/UL (ref 4.2–5.9)
SALICYLATE, SERUM: <0.3 MG/DL (ref 15–30)
SARS-COV-2 RNA, RT PCR: NOT DETECTED
SODIUM BLD-SCNC: 139 MMOL/L (ref 136–145)
TOTAL PROTEIN: 7.4 G/DL (ref 6.4–8.2)
WBC # BLD: 9.4 K/UL (ref 4–11)

## 2021-04-14 PROCEDURE — 87636 SARSCOV2 & INF A&B AMP PRB: CPT

## 2021-04-14 PROCEDURE — 85025 COMPLETE CBC W/AUTO DIFF WBC: CPT

## 2021-04-14 PROCEDURE — 80307 DRUG TEST PRSMV CHEM ANLYZR: CPT

## 2021-04-14 PROCEDURE — 80143 DRUG ASSAY ACETAMINOPHEN: CPT

## 2021-04-14 PROCEDURE — 80179 DRUG ASSAY SALICYLATE: CPT

## 2021-04-14 PROCEDURE — 80053 COMPREHEN METABOLIC PANEL: CPT

## 2021-04-14 PROCEDURE — 99285 EMERGENCY DEPT VISIT HI MDM: CPT

## 2021-04-14 PROCEDURE — 82077 ASSAY SPEC XCP UR&BREATH IA: CPT

## 2021-04-14 NOTE — ED NOTES
Level of Care Disposition: Admit/Transfer due to no beds available      Patient was seen by ED provider and [facility] AN AFFILIATE OF [facility] staff. The case presented to psychiatric provider on-call Dr. Chencho Daly. Based on the ED evaluation and information presented to the provider by this writer it is the recommendation of the on call psychiatric provider that inpatient hospitalization is the least restrictive environment for the patient at this time. The patient will be admitted to an inpatient unit.      RATIONALE FOR ADMISSION:   Patient at imminent risk of danger to self as demonstrated by SI with plan to hang himself with a belt         [facility]  04/14/21 [id number]

## 2021-04-14 NOTE — ED PROVIDER NOTES
Social History     Socioeconomic History    Marital status: Single     Spouse name: Not on file    Number of children: Not on file    Years of education: Not on file    Highest education level: Not on file   Occupational History    Occupation: [address] Financial resource strain: Not on file    Food insecurity     Worry: Not on file     Inability: Not on file   [employer] needs     Medical: Not on file     Non-medical: Not on file   Tobacco Use    Smoking status: Former Smoker     Types: Cigars    Smokeless tobacco: Never Used    Tobacco comment: once a month   Substance and Sexual Activity    Alcohol use: No    Drug use: No    Sexual activity: Yes     Partners: Female   Lifestyle    Physical activity     Days per week: Not on file     Minutes per session: Not on file    Stress: Not on file   Relationships    Social connections     Talks on phone: Not on file     Gets together: Not on file     Attends Hindu service: Not on file     Active member of club or organization: Not on file     Attends meetings of clubs or organizations: Not on file     Relationship status: Not on file    Intimate partner violence     Fear of current or ex partner: Not on file     Emotionally abused: Not on file     Physically abused: Not on file     Forced sexual activity: Not on file   Other Topics Concern    Not on file   Social History Narrative    Not on file     No current facility-administered medications for this encounter.       Current Outpatient Medications   Medication Sig Dispense Refill    lisinopril (PRINIVIL;ZESTRIL) 10 MG tablet TAKE ONE TABLET BY MOUTH DAILY (Patient taking differently: 20 mg ) 90 tablet 3    metoprolol tartrate (LOPRESSOR) 50 MG tablet Take 0.5 tablets by mouth 2 times daily (Patient taking differently: Take 50 mg by mouth 2 times daily ) 60 tablet 11    aspirin 81 MG EC tablet Take 1 tablet by mouth daily 30 tablet 3    extremities. Sensation intact to light touch. Gait normal.  Psychiatric: Mood/affect: depressed     LABS  I have reviewed all labs for this visit.    Results for orders placed or performed during the hospital encounter of 04/14/21   COVID-19 & Influenza Combo    Specimen: Nasopharyngeal Swab   Result Value Ref Range    SARS-CoV-2 RNA, RT PCR NOT DETECTED NOT DETECTED    INFLUENZA A NOT DETECTED NOT DETECTED    INFLUENZA B NOT DETECTED NOT DETECTED   CBC Auto Differential   Result Value Ref Range    WBC 9.4 4.0 - 11.0 K/uL    RBC 4.89 4.20 - 5.90 M/uL    Hemoglobin 14.4 13.5 - 17.5 g/dL    Hematocrit 42.8 40.5 - 52.5 %    MCV 87.5 80.0 - 100.0 fL    MCH 29.5 26.0 - 34.0 pg    MCHC 33.7 31.0 - 36.0 g/dL    RDW 13.5 12.4 - 15.4 %    Platelets 597 154 - 488 K/uL    MPV 8.0 5.0 - 10.5 fL    Neutrophils % 78.8 %    Lymphocytes % 16.1 %    Monocytes % 4.1 %    Eosinophils % 0.5 %    Basophils % 0.5 %    Neutrophils Absolute 7.4 1.7 - 7.7 K/uL    Lymphocytes Absolute 1.5 1.0 - 5.1 K/uL    Monocytes Absolute 0.4 0.0 - 1.3 K/uL    Eosinophils Absolute 0.0 0.0 - 0.6 K/uL    Basophils Absolute 0.0 0.0 - 0.2 K/uL   Comprehensive Metabolic Panel w/ Reflex to MG   Result Value Ref Range    Sodium 139 136 - 145 mmol/L    Potassium reflex Magnesium 4.2 3.5 - 5.1 mmol/L    Chloride 100 99 - 110 mmol/L    CO2 26 21 - 32 mmol/L    Anion Gap 13 3 - 16    Glucose 171 (H) 70 - 99 mg/dL    BUN 18 7 - 20 mg/dL    CREATININE 1.2 0.9 - 1.3 mg/dL    GFR Non-African American >60 >60    GFR African American >60 >60    Calcium 10.6 8.3 - 10.6 mg/dL    Total Protein 7.4 6.4 - 8.2 g/dL    Albumin 4.7 3.4 - 5.0 g/dL    Albumin/Globulin Ratio 1.7 1.1 - 2.2    Total Bilirubin 0.8 0.0 - 1.0 mg/dL    Alkaline Phosphatase 67 40 - 129 U/L    ALT 28 10 - 40 U/L    AST 28 15 - 37 U/L    Globulin 2.7 g/dL   Ethanol   Result Value Ref Range    Ethanol Lvl None Detected mg/dL   DRUG SCREEN MULTI URINE   Result Value Ref Range    Amphetamine Screen, Urine Neg This chart was created using voice recognition dictation software. Efforts were made by me to ensure accuracy, however some errors may be present due to limitations of this technology and occasionally words are not transcribed correctly.        Anabelle Guaman MD  04/14/21 [id number]

## 2021-04-14 NOTE — ED NOTES
Presenting Problem: SI with plan to hang himself with a belt    Appearance/Hygiene:  hospital attire, lying in bed, fair grooming and fair hygiene   Motor Behavior: WNL   Attitude: cooperative  Affect: flat affect   Speech: normal pitch and normal volume  Mood: decreased range  Thought Processes: [place]  Perceptions: Absent   Thought content: helplessness    Suicidal ideation:  specific plan to harm self: hang self with a belt   Homicidal ideation:  none  Orientation: A&Ox4   Memory: intact  Concentration: Fair    Insight/ judgement: impaired judgment      Psychosocial and contextual factors: Pt lives at home with his fiance and reports adequate support. He is currently unemployed and on [address]. C-SSRS Summary (including current and past suicidal ideation, plan, intent, and attempts) : Pt reports hx of one suicide attempt 20 years ago by hanging himself and the tree branch broke. He reports current SI with plan to hang himself with a belt on a door.     Patient reported diagnosis: Schizophrenia, Depression    Outpatient services/ Provider: Kalen Cerrato with NP, Briana Novak    Previous Inpatient Admissions( including location and dates if known): Hx on [facility] 4x    Self-injurious/ Self-harm behavior: Denies    History of violence: Denies    Current Substance use: Denies    Trauma identified: Denies    Access to Firearms: Denies    ASSESSMENT FOR IMMINENT FUTURE DANGER:      RISK FACTORS:    []  Age <25 or >49   [x]  Male gender   [x]  Depressed mood   [x]  Active suicidal ideation   [x]  Suicide plan   []  Suicide attempt   [x]  Access to lethal means   [x]  Prior suicide attempt   []  Active substance abuse   []  Highly impulsive behaviors   [x]  Not attending to self-care/ADLs    []  Recent significant loss   []  Chronic pain or medical illness   []  Social isolation   []  History of violence   []  Active psychosis   []  Cognitive impairment    []  No outpatient services in place   []  Medication noncompliance   []  No

## 2021-04-14 NOTE — BH NOTE
Received verbal report from [facility] and [place], [place]. Patient in and out of room for toileting, request phone. Denies any discomfort. Monitored for safety. Verbally contracts for safety while in the hospital setting. Dr. Buddy Thomas provided verbal order for d/c of suicide precautions. Monitored for safety.

## 2021-04-15 NOTE — ED NOTES
Vital's obtained. Pt resting in room, no signs of distress noted. Will continue to monitor for safety.      Jose M Hockey MSW,LSW

## 2021-04-15 NOTE — ED NOTES
Pt resting in room, no signs of distress noted. Will continue to monitor for safety.      Shaggy Amezcua MSW,LSW

## 2021-04-15 NOTE — ED NOTES
Pt resting in room, no signs of distress noted. Will continue to monitor for safety.      Loera Gains MSW,LSW

## 2021-04-15 NOTE — ED NOTES
Rosa Self From [facility] called and reports pt is accepted at [facility]. Rosa Self reports the hold needs to be faxed over. Writer will fax hold to [facility]. Rosa Self reports once they get the hold she can call back with the accepting information.         Luke Barnes, MSW,LSW

## 2021-04-15 NOTE — ED NOTES
[facility] from [facility] reports [facility] will have beds in the morning and reports they needed to ask a question. Writer was able to provided an answer for them.        William Okeefe MSW,LSW

## 2021-04-15 NOTE — BH NOTE
Patient awakens easily to verbal stimuli. Denies any current distress. Aware of transfer to [facility] and that  will be between 2-3 am.  Monitored for safety.

## 2021-04-15 NOTE — BH NOTE
Patient resting quietly with eyes closed, no outward s/s distress noted. Monitored for safety. Fingerstick blood sugar obtained, 131.

## 2021-10-24 ENCOUNTER — HOSPITAL ENCOUNTER (EMERGENCY)
Age: 51
Discharge: HOME | End: 2021-10-24
Attending: EMERGENCY MEDICINE
Payer: MEDICARE

## 2021-10-24 ENCOUNTER — APPOINTMENT (OUTPATIENT)
Dept: GENERAL RADIOLOGY | Age: 51
End: 2021-10-24
Payer: MEDICARE

## 2021-10-24 VITALS
BODY MASS INDEX: 31.15 KG/M2 | OXYGEN SATURATION: 98 % | RESPIRATION RATE: 18 BRPM | HEART RATE: 62 BPM | SYSTOLIC BLOOD PRESSURE: 137 MMHG | DIASTOLIC BLOOD PRESSURE: 92 MMHG | HEIGHT: 72 IN | TEMPERATURE: 98.1 F | WEIGHT: 230 LBS

## 2021-10-24 DIAGNOSIS — R73.9 HYPERGLYCEMIA: Primary | ICD-10-CM

## 2021-10-24 LAB
A/G RATIO: 1.5 (ref 1.1–2.2)
ALBUMIN SERPL-MCNC: 4.3 G/DL (ref 3.4–5)
ALP BLD-CCNC: 64 U/L (ref 40–129)
ALT SERPL-CCNC: 27 U/L (ref 10–40)
ANION GAP SERPL CALCULATED.3IONS-SCNC: 13 MMOL/L (ref 3–16)
AST SERPL-CCNC: 20 U/L (ref 15–37)
BACTERIA: ABNORMAL /HPF
BASOPHILS ABSOLUTE: 0 K/UL (ref 0–0.2)
BASOPHILS RELATIVE PERCENT: 0.5 %
BILIRUB SERPL-MCNC: 0.3 MG/DL (ref 0–1)
BILIRUBIN URINE: NEGATIVE
BLOOD, URINE: ABNORMAL
BUN BLDV-MCNC: 10 MG/DL (ref 7–20)
CALCIUM SERPL-MCNC: 9.8 MG/DL (ref 8.3–10.6)
CHLORIDE BLD-SCNC: 106 MMOL/L (ref 99–110)
CLARITY: CLEAR
CO2: 23 MMOL/L (ref 21–32)
COLOR: YELLOW
CREAT SERPL-MCNC: 1 MG/DL (ref 0.9–1.3)
EOSINOPHILS ABSOLUTE: 0.3 K/UL (ref 0–0.6)
EOSINOPHILS RELATIVE PERCENT: 3.7 %
EPITHELIAL CELLS, UA: ABNORMAL /HPF (ref 0–5)
GFR AFRICAN AMERICAN: >60
GFR NON-AFRICAN AMERICAN: >60
GLOBULIN: 2.9 G/DL
GLUCOSE BLD-MCNC: 136 MG/DL
GLUCOSE BLD-MCNC: 136 MG/DL (ref 70–99)
GLUCOSE BLD-MCNC: 142 MG/DL
GLUCOSE BLD-MCNC: 142 MG/DL (ref 70–99)
GLUCOSE BLD-MCNC: 165 MG/DL (ref 70–99)
GLUCOSE BLD-MCNC: 169 MG/DL (ref 70–99)
GLUCOSE URINE: NEGATIVE MG/DL
HCT VFR BLD CALC: 40.8 % (ref 40.5–52.5)
HEMOGLOBIN: 13.8 G/DL (ref 13.5–17.5)
KETONES, URINE: ABNORMAL MG/DL
LEUKOCYTE ESTERASE, URINE: NEGATIVE
LYMPHOCYTES ABSOLUTE: 2 K/UL (ref 1–5.1)
LYMPHOCYTES RELATIVE PERCENT: 27.4 %
MCH RBC QN AUTO: 29.1 PG (ref 26–34)
MCHC RBC AUTO-ENTMCNC: 33.8 G/DL (ref 31–36)
MCV RBC AUTO: 86.2 FL (ref 80–100)
MICROSCOPIC EXAMINATION: YES
MONOCYTES ABSOLUTE: 0.6 K/UL (ref 0–1.3)
MONOCYTES RELATIVE PERCENT: 7.9 %
MUCUS: ABNORMAL /LPF
NEUTROPHILS ABSOLUTE: 4.4 K/UL (ref 1.7–7.7)
NEUTROPHILS RELATIVE PERCENT: 60.5 %
NITRITE, URINE: NEGATIVE
PDW BLD-RTO: 13.4 % (ref 12.4–15.4)
PERFORMED ON: ABNORMAL
PH UA: 5.5 (ref 5–8)
PLATELET # BLD: 217 K/UL (ref 135–450)
PMV BLD AUTO: 7.6 FL (ref 5–10.5)
POTASSIUM REFLEX MAGNESIUM: 4.3 MMOL/L (ref 3.5–5.1)
PROTEIN UA: NEGATIVE MG/DL
RBC # BLD: 4.73 M/UL (ref 4.2–5.9)
RBC UA: ABNORMAL /HPF (ref 0–4)
SODIUM BLD-SCNC: 142 MMOL/L (ref 136–145)
SPECIFIC GRAVITY UA: 1.02 (ref 1–1.03)
TOTAL PROTEIN: 7.2 G/DL (ref 6.4–8.2)
URINE REFLEX TO CULTURE: ABNORMAL
URINE TYPE: ABNORMAL
UROBILINOGEN, URINE: 0.2 E.U./DL
WBC # BLD: 7.2 K/UL (ref 4–11)
WBC UA: ABNORMAL /HPF (ref 0–5)

## 2021-10-24 PROCEDURE — 80053 COMPREHEN METABOLIC PANEL: CPT

## 2021-10-24 PROCEDURE — 36415 COLL VENOUS BLD VENIPUNCTURE: CPT

## 2021-10-24 PROCEDURE — 85025 COMPLETE CBC W/AUTO DIFF WBC: CPT

## 2021-10-24 PROCEDURE — 81001 URINALYSIS AUTO W/SCOPE: CPT

## 2021-10-24 PROCEDURE — 99285 EMERGENCY DEPT VISIT HI MDM: CPT

## 2021-10-24 PROCEDURE — 71045 X-RAY EXAM CHEST 1 VIEW: CPT

## 2021-10-24 ASSESSMENT — ENCOUNTER SYMPTOMS
COUGH: 0
VOMITING: 0
ABDOMINAL PAIN: 0
DIARRHEA: 0
EYE DISCHARGE: 0
BACK PAIN: 0
SORE THROAT: 0
NAUSEA: 0

## 2021-10-24 NOTE — ED NOTES
Pt ambulatory to restroom with steady gait to provide urine sample.       Milagro Montgomery RN  10/24/21 [id number]

## 2021-10-24 NOTE — ED PROVIDER NOTES
[address] [facility]        Pt Name: Dmitry Guerrier  MRN: [id number]  [place] 1970  Date of evaluation: 10/24/2021  Provider: Gregoria Grace MD  PCP: ANA Davidson - CNP  ED Attending: Gregoria Grace MD    [address] [place] [address]       Chief Complaint   Patient presents with    Hyperglycemia     Pt came in for high blood sugar. Pt states that his BS lastnight was 362. This morning was 311 after using insulin pt states he took 134 units of insulin. Pt then took 124 units after BS reading of 242. HISTORY OF PRESENT ILLNESS   (Location/Symptom, Timing/Onset, Context/Setting, Quality, Duration, Modifying Factors, Severity)  Note limiting factors. Dmitry Guerrier is a 49 y.o. male who presents to the emergency department because his blood sugar was high. Patient stated that last night apparently his blood sugar was in the 300s. This morning the patient noted his blood sugars were still in the 300s. He took insulin which lowered it somewhat however not enough. He then took additional insulin and it was still in the 200s. Patient then decided to come into the emergency department. He states he otherwise feels fine. No recent illnesses. History is obtained from the patient. REVIEW OF SYSTEMS    (2-9 systems for level 4, 10 or more for level 5)     Review of Systems   Constitutional: Negative for chills and fever. HENT: Negative for congestion and sore throat. Eyes: Negative for discharge. Respiratory: Negative for cough. Cardiovascular: Negative for chest pain. Gastrointestinal: Negative for abdominal pain, diarrhea, nausea and vomiting. Endocrine: Negative for polydipsia. Genitourinary: Negative for dysuria and urgency. Musculoskeletal: Negative for back pain and myalgias. Skin: Negative for rash. Neurological: Negative for weakness and headaches. Hematological: Does not bruise/bleed easily. Psychiatric/Behavioral: Negative for confusion. Positives and Pertinent negatives as per HPI. Except as noted above in the ROS, all other systems were reviewed and negative. PAST MEDICAL HISTORY     Past Medical History:   Diagnosis Date    Bipolar affective ([facility]     CAD (coronary artery disease) 04/2018     of mLAD    Depression     Diabetes mellitus ([facility]     Hashimoto disease     Hyperlipidemia     Hypertension     Lithium intoxication 06/30/2018    required one time dialysis treatment for toxicity    Schizophrenia ([facility]     Sleep apnea          SURGICAL HISTORY     Past Surgical History:   Procedure Laterality Date    CARDIAC CATHETERIZATION  03/22/2018    100% occlusion of the mLAD    COLONOSCOPY N/A 1/7/2019    COLONOSCOPY performed by Annetta Loaiza MD at [place]  05/29/2018    POBA of LAD    MT CABG, ARTERIAL, THREE N/A 8/20/2018    CORONARY ARTERY BYPASS GRAFTING X3, INTERNAL MAMMARY ARTERY, SAPHENOUS VEIN GRAFT, ON PUMP performed by Asha Daly MD at [facility] 10/3/2018    EGD DIAGNOSTIC ONLY performed by Tracey Pérez MD at [facility]       Discharge Medication List as of 10/24/2021  5:21 PM      CONTINUE these medications which have NOT CHANGED    Details   lisinopril (PRINIVIL;ZESTRIL) 10 MG tablet TAKE ONE TABLET BY MOUTH DAILY, Disp-90 tablet, R-3Normal      metoprolol tartrate (LOPRESSOR) 50 MG tablet Take 0.5 tablets by mouth 2 times daily, Disp-60 tablet, R-11Please fill now. Pt is out of medicationNormal      aspirin 81 MG EC tablet Take 1 tablet by mouth daily, Disp-30 tablet, R-3Normal      lamoTRIgine (LAMICTAL) 25 MG tablet Take 3 tablets PO daily. Follow up with MD for further titrations. , Disp-15 tablet, R-0Print      alogliptin-metformin (KAZANO) 12.5-1000 MG TABS Take 1 capsule by mouth 2 times dailyHistorical Med      insulin aspart (NOVOLOG) 100 UNIT/ML injection vial Inject into the skin as needed for High Blood Sugar For high blood sugar as neededHistorical Med      levothyroxine (SYNTHROID) 150 MCG tablet Take 150 mcg by mouth nightlyHistorical Med      b complex vitamins capsule Take 1 capsule by mouth 2 times dailyHistorical Med      insulin glargine (BASAGLAR KWIKPEN) 100 UNIT/ML injection pen Inject 50 Units into the skin every morning Historical Med      folic acid (FOLVITE) 1 MG tablet Take 1 mg by mouth dailyHistorical Med               ALLERGIES     Dye [iodides]    FAMILYHISTORY       Family History   Problem Relation Age of Onset    Cancer Mother         Pancreatic    Other Father     Diabetes Brother     High Blood Pressure Brother     High Cholesterol Brother           SOCIAL HISTORY       Social History     Socioeconomic History    Marital status: Single     Spouse name: None    Number of children: None    Years of education: None    Highest education level: None   Occupational History    Occupation: [employer]   Tobacco Use    Smoking status: Light Tobacco Smoker     Types: Cigars    Smokeless tobacco: Never Used    Tobacco comment: once a month   Vaping Use    Vaping Use: Never used   Substance and Sexual Activity    Alcohol use: No    Drug use: No    Sexual activity: Yes     Partners: Female   Other Topics Concern    None   Social History Narrative    None     Social Determinants of Health     Financial Resource Strain:     Difficulty of Paying Living Expenses:    Food Insecurity:     Worried About Running Out of Food in the Last Year:     Ran Out of Food in the Last Year:    Transportation Needs:     Lack of Transportation (Medical):      Lack of Transportation (Non-Medical):    Physical Activity:     Days of Exercise per Week:     Minutes of Exercise per Session:    Stress:     Feeling of Stress :    Social Connections:     Frequency of Communication with Friends and Family:  Frequency of Social Gatherings with Friends and Family:     Attends Yarsanism Services:     Active Member of Clubs or Organizations:     Attends Club or Organization Meetings:     Marital Status:    Intimate Partner Violence:     Fear of Current or Ex-Partner:     Emotionally Abused:     Physically Abused:     Sexually Abused:        SCREENINGS    [place] Coma Scale  Eye Opening: Spontaneous  Best Verbal Response: Oriented  Best Motor Response: Obeys commands  [place] Coma Scale Score: 15        PHYSICAL EXAM    (up to 7 for level 4, 8 or more for level 5)     ED Triage Vitals [10/24/21 1350]   BP Temp Temp Source Pulse Resp SpO2 Height Weight   (!) 151/98 98.1 °F (36.7 °C) Oral 65 18 97 % 6' (1.829 m) 230 lb (104.3 kg)       Physical Exam  Vitals and nursing note reviewed. Constitutional:       General: He is not in acute distress. Appearance: He is well-developed. He is obese. He is not ill-appearing or toxic-appearing. HENT:      Head: Normocephalic and atraumatic. Right Ear: External ear normal.      Left Ear: External ear normal.      Nose: Nose normal.      Mouth/Throat:      Pharynx: No oropharyngeal exudate. Eyes:      Conjunctiva/sclera: Conjunctivae normal.   Cardiovascular:      Rate and Rhythm: Normal rate and regular rhythm. Heart sounds: Normal heart sounds. No murmur heard. No friction rub. No gallop. Pulmonary:      Effort: Pulmonary effort is normal. No respiratory distress. Breath sounds: Normal breath sounds. No wheezing. Abdominal:      General: Bowel sounds are normal. There is no distension. Palpations: Abdomen is soft. Tenderness: There is no abdominal tenderness. There is no guarding or rebound. Musculoskeletal:         General: No tenderness. Normal range of motion. Cervical back: Normal range of motion and neck supple. Right lower leg: No edema. Left lower leg: No edema.    Lymphadenopathy:      Cervical: No cervical Negative Negative    Ketones, Urine TRACE (A) Negative mg/dL    Specific Gravity, UA 1.025 1.005 - 1.030    Blood, Urine TRACE-INTACT (A) Negative    pH, UA 5.5 5.0 - 8.0    Protein, UA Negative Negative mg/dL    Urobilinogen, Urine 0.2 <2.0 E.U./dL    Nitrite, Urine Negative Negative    Leukocyte Esterase, Urine Negative Negative    Microscopic Examination YES     Urine Type NotGiven     Urine Reflex to Culture Not Indicated    Microscopic Urinalysis   Result Value Ref Range    Mucus, UA 2+ (A) None Seen /LPF    WBC, UA 0-2 0 - 5 /HPF    RBC, UA 0-2 0 - 4 /HPF    Epithelial Cells, UA 0-1 0 - 5 /HPF    Bacteria, UA Rare (A) None Seen /HPF   POCT Glucose   Result Value Ref Range    POC Glucose 165 (H) 70 - 99 mg/dl    Performed on ACCU-CHEK    POCT glucose   Result Value Ref Range    Glucose 142 mg/dL   POCT glucose   Result Value Ref Range    Glucose 136 mg/dL   POCT Glucose   Result Value Ref Range    POC Glucose 142 (H) 70 - 99 mg/dl    Performed on ACCU-CHEK    POCT Glucose   Result Value Ref Range    POC Glucose 136 (H) 70 - 99 mg/dl    Performed on ACCU-CHEK        All other labs were within normal range ornot returned as of this dictation. EKG: All EKG's are interpreted by the Emergency Department Physician who either signs or Co-signs this chart in the absence of a cardiologist.  Please see their note for interpretation of EKG. RADIOLOGY:   Non-plain film images such as CT, Ultrasound and MRI are read by the radiologist.  Plain radiographic images are visualized and preliminarily interpreted by the ED Provider with the belowfindings:    Interpretation per the Radiologist below, if available at the time of this note:    XR CHEST PORTABLE   Final Result   No acute cardiopulmonary abnormality identified.                PROCEDURES   Unless otherwise noted below, none     Procedures    CRITICAL CARE TIME   N/A    CONSULTS:  None      EMERGENCY DEPARTMENT COURSE and DIFFERENTIAL DIAGNOSIS/MDM:   Vitals: Vitals:    10/24/21 1350 10/24/21 1455 10/24/21 1606 10/24/21 1722   BP: (!) 151/98 (!) 133/97 123/85 (!) 137/92   Pulse: 65 63 61 62   Resp: 18 16 16 18   Temp: 98.1 °F (36.7 °C)      TempSrc: Oral      SpO2: 97% 98% 98% 98%   Weight: 230 lb (104.3 kg)      Height: 6' (1.829 m)          Patient was given the following medications:  Medications - No data to display    Patient is a 60-year-old male with a history of diabetes who presents to the emergency department with hunger as well as hyperglycemia. Patient was fed and given several boluses of insulin. His blood sugar has begun to improve. I have given the patient numerous resources on where to obtain food. Patient needs to begin checking his blood sugars on at least a daily basis and to ensure that he is taking his insulin as previously directed. I want the patient to follow-up closely with primary care. Differential diagnosis included but was not limited to: DKA, sepsis, infection, electrolyte abnormality, dehydration, acute kidney injury. The patient understands the importance of follow up and reasons to return. FINAL IMPRESSION      1. Hyperglycemia          DISPOSITION/PLAN   DISPOSITION Decision To Discharge 10/24/2021 04:26:45 PM      PATIENT REFERRED TO:  ANA Acevedo CNP [address] [facility]  [phone]    Schedule an appointment as soon as possible for a visit in 1 week      [facility] Emergency Department  [address] [place] [address]  [phone]  Go to   If symptoms worsen      DISCHARGE MEDICATIONS:  Discharge Medication List as of 10/24/2021  5:21 PM          DISCONTINUED MEDICATIONS:  Discharge Medication List as of 10/24/2021  5:21 PM                 (Please note that portions of this note were completed with a voice recognition program.  Efforts were made to edit the dictations but occasionally words are mis-transcribed.)    Estrada Lane MD(electronically signed) Pankaj Godwin MD  10/28/21 [id number]

## 2022-03-04 ENCOUNTER — HOSPITAL ENCOUNTER (OUTPATIENT)
Dept: VASCULAR LAB | Age: 52
Discharge: HOME | End: 2022-03-04
Payer: MEDICARE

## 2022-03-04 DIAGNOSIS — R09.89 DECREASED PEDAL PULSES: ICD-10-CM

## 2022-03-04 PROCEDURE — 93922 UPR/L XTREMITY ART 2 LEVELS: CPT

## 2022-06-30 LAB
ALBUMIN SERPL-MCNC: 4.5 G/DL
ALP BLD-CCNC: 64 U/L
ALT SERPL-CCNC: 21 U/L
ANION GAP SERPL CALCULATED.3IONS-SCNC: 2 MMOL/L
AST SERPL-CCNC: 19 U/L
AVERAGE GLUCOSE: 148
BILIRUB SERPL-MCNC: 0.5 MG/DL (ref 0.1–1.4)
BUN BLDV-MCNC: 16 MG/DL
CALCIUM SERPL-MCNC: 9.6 MG/DL
CHLORIDE BLD-SCNC: 108 MMOL/L
CHOLESTEROL, TOTAL: 190 MG/DL
CHOLESTEROL/HDL RATIO: ABNORMAL
CO2: 18 MMOL/L
CREAT SERPL-MCNC: 1.28 MG/DL
GFR CALCULATED: 68
GLUCOSE BLD-MCNC: NORMAL MG/DL
HBA1C MFR BLD: 6.8 %
HDLC SERPL-MCNC: 26 MG/DL (ref 35–70)
LDL CHOLESTEROL CALCULATED: 116 MG/DL (ref 0–160)
NONHDLC SERPL-MCNC: ABNORMAL MG/DL
POTASSIUM SERPL-SCNC: NORMAL MMOL/L
SODIUM BLD-SCNC: 142 MMOL/L
TOTAL PROTEIN: 6.8
TRIGL SERPL-MCNC: 275 MG/DL
TSH SERPL DL<=0.05 MIU/L-ACNC: 0.43 UIU/ML
VLDLC SERPL CALC-MCNC: 48 MG/DL

## 2023-01-02 ENCOUNTER — APPOINTMENT (OUTPATIENT)
Dept: CT IMAGING | Age: 53
End: 2023-01-02
Payer: OTHER MISCELLANEOUS

## 2023-01-02 ENCOUNTER — HOSPITAL ENCOUNTER (EMERGENCY)
Age: 53
Discharge: ANOTHER HOSPITAL | End: 2023-01-02
Attending: EMERGENCY MEDICINE
Payer: OTHER MISCELLANEOUS

## 2023-01-02 VITALS
RESPIRATION RATE: 18 BRPM | OXYGEN SATURATION: 97 % | HEART RATE: 80 BPM | TEMPERATURE: 98.2 F | SYSTOLIC BLOOD PRESSURE: 202 MMHG | DIASTOLIC BLOOD PRESSURE: 87 MMHG

## 2023-01-02 DIAGNOSIS — S39.012A STRAIN OF LUMBAR REGION, INITIAL ENCOUNTER: ICD-10-CM
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
## 2023-01-02 DIAGNOSIS — Z86.39 HISTORY OF INSULIN DEPENDENT DIABETES MELLITUS: ICD-10-CM

## 2023-01-02 DIAGNOSIS — S36.029A SPLEEN HEMATOMA, INITIAL ENCOUNTER: Primary | ICD-10-CM

## 2023-01-02 LAB
A/G RATIO: 1.4 (ref 1.1–2.2)
ALBUMIN SERPL-MCNC: 3.6 G/DL (ref 3.4–5)
ALP BLD-CCNC: 57 U/L (ref 40–129)
ALT SERPL-CCNC: 34 U/L (ref 10–40)
ANION GAP SERPL CALCULATED.3IONS-SCNC: 11 MMOL/L (ref 3–16)
AST SERPL-CCNC: 41 U/L (ref 15–37)
BASOPHILS ABSOLUTE: 0 K/UL (ref 0–0.2)
BASOPHILS RELATIVE PERCENT: 0.5 %
BILIRUB SERPL-MCNC: 0.4 MG/DL (ref 0–1)
BUN BLDV-MCNC: 12 MG/DL (ref 7–20)
CALCIUM SERPL-MCNC: 8.8 MG/DL (ref 8.3–10.6)
CHLORIDE BLD-SCNC: 108 MMOL/L (ref 99–110)
CO2: 22 MMOL/L (ref 21–32)
CREAT SERPL-MCNC: 1.1 MG/DL (ref 0.9–1.3)
EOSINOPHILS ABSOLUTE: 0.1 K/UL (ref 0–0.6)
EOSINOPHILS RELATIVE PERCENT: 1.5 %
GFR SERPL CREATININE-BSD FRML MDRD: >60 ML/MIN/{1.73_M2}
GLUCOSE BLD-MCNC: 190 MG/DL (ref 70–99)
HCT VFR BLD CALC: 37.8 % (ref 40.5–52.5)
HEMOGLOBIN: 13.1 G/DL (ref 13.5–17.5)
LIPASE: 82 U/L (ref 13–60)
LYMPHOCYTES ABSOLUTE: 2.2 K/UL (ref 1–5.1)
LYMPHOCYTES RELATIVE PERCENT: 24.8 %
MCH RBC QN AUTO: 29.5 PG (ref 26–34)
MCHC RBC AUTO-ENTMCNC: 34.7 G/DL (ref 31–36)
MCV RBC AUTO: 85 FL (ref 80–100)
MONOCYTES ABSOLUTE: 0.4 K/UL (ref 0–1.3)
MONOCYTES RELATIVE PERCENT: 4.7 %
NEUTROPHILS ABSOLUTE: 5.9 K/UL (ref 1.7–7.7)
NEUTROPHILS RELATIVE PERCENT: 68.5 %
PDW BLD-RTO: 13.8 % (ref 12.4–15.4)
PLATELET # BLD: 200 K/UL (ref 135–450)
PMV BLD AUTO: 8.5 FL (ref 5–10.5)
POTASSIUM SERPL-SCNC: 4.8 MMOL/L (ref 3.5–5.1)
RBC # BLD: 4.45 M/UL (ref 4.2–5.9)
SODIUM BLD-SCNC: 141 MMOL/L (ref 136–145)
TOTAL PROTEIN: 6.1 G/DL (ref 6.4–8.2)
WBC # BLD: 8.7 K/UL (ref 4–11)

## 2023-01-02 PROCEDURE — 6360000004 HC RX CONTRAST MEDICATION: Performed by: EMERGENCY MEDICINE

## 2023-01-02 PROCEDURE — 83690 ASSAY OF LIPASE: CPT

## 2023-01-02 PROCEDURE — 71260 CT THORAX DX C+: CPT

## 2023-01-02 PROCEDURE — 3209999900 CT LUMBAR SPINE TRAUMA RECONSTRUCTION

## 2023-01-02 PROCEDURE — 2580000003 HC RX 258: Performed by: EMERGENCY MEDICINE

## 2023-01-02 PROCEDURE — 99285 EMERGENCY DEPT VISIT HI MDM: CPT

## 2023-01-02 PROCEDURE — 3209999900 CT THORACIC SPINE TRAUMA RECONSTRUCTION

## 2023-01-02 PROCEDURE — 36415 COLL VENOUS BLD VENIPUNCTURE: CPT

## 2023-01-02 PROCEDURE — 85025 COMPLETE CBC W/AUTO DIFF WBC: CPT

## 2023-01-02 PROCEDURE — 96374 THER/PROPH/DIAG INJ IV PUSH: CPT

## 2023-01-02 PROCEDURE — 80053 COMPREHEN METABOLIC PANEL: CPT

## 2023-01-02 PROCEDURE — 6360000002 HC RX W HCPCS: Performed by: EMERGENCY MEDICINE

## 2023-01-02 RX ORDER — 0.9 % SODIUM CHLORIDE 0.9 %
1000 INTRAVENOUS SOLUTION INTRAVENOUS ONCE
Status: COMPLETED | OUTPATIENT
Start: 2023-01-02 | End: 2023-01-02

## 2023-01-02 RX ORDER — METHYLPREDNISOLONE SODIUM SUCCINATE 125 MG/2ML
125 INJECTION, POWDER, LYOPHILIZED, FOR SOLUTION INTRAMUSCULAR; INTRAVENOUS ONCE
Status: COMPLETED | OUTPATIENT
Start: 2023-01-02 | End: 2023-01-02

## 2023-01-02 RX ADMIN — IOPAMIDOL 75 ML: 755 INJECTION, SOLUTION INTRAVENOUS at 17:26

## 2023-01-02 RX ADMIN — METHYLPREDNISOLONE SODIUM SUCCINATE 125 MG: 125 INJECTION, POWDER, FOR SOLUTION INTRAMUSCULAR; INTRAVENOUS at 17:01

## 2023-01-02 RX ADMIN — SODIUM CHLORIDE 1000 ML: 9 INJECTION, SOLUTION INTRAVENOUS at 16:06

## 2023-01-02 ASSESSMENT — PAIN DESCRIPTION - ORIENTATION: ORIENTATION: MID

## 2023-01-02 ASSESSMENT — PAIN DESCRIPTION - PAIN TYPE: TYPE: ACUTE PAIN

## 2023-01-02 ASSESSMENT — PAIN DESCRIPTION - DESCRIPTORS: DESCRIPTORS: DISCOMFORT

## 2023-01-02 ASSESSMENT — PAIN DESCRIPTION - FREQUENCY: FREQUENCY: CONTINUOUS

## 2023-01-02 ASSESSMENT — PAIN SCALES - GENERAL: PAINLEVEL_OUTOF10: 4

## 2023-01-02 ASSESSMENT — PAIN - FUNCTIONAL ASSESSMENT
PAIN_FUNCTIONAL_ASSESSMENT: ACTIVITIES ARE NOT PREVENTED
PAIN_FUNCTIONAL_ASSESSMENT: 0-10

## 2023-01-02 ASSESSMENT — PAIN DESCRIPTION - LOCATION: LOCATION: BACK

## 2023-01-02 ASSESSMENT — PAIN DESCRIPTION - ONSET: ONSET: ON-GOING
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
## 2023-01-03 NOTE — ED NOTES
Report to [facility] for transport to [facility] ED.      Alicia Calderon, RN  01/02/23 0282

## 2023-01-03 NOTE — ED NOTES
Report called to [address], RN at [facility] ED. Verbalized understanding of ETA for transport. The patient is alert and oriented at this time with family at bedside.       Homar Lewis RN  01/02/23 1950

## 2023-02-28 LAB
CREATININE, EXTERNAL: 1.25
HBA1C MFR BLD HPLC: 6.7 %
LDL CHOLESTEROL, EXTERNAL: 113
TOTAL CHOLESTEROL, EXTERNAL: 209

## 2023-10-03 NOTE — PROGRESS NOTES
1615 Dr Shiv Knutson at bedside talking to family     1625 levo titrated down to 0.08 at this time for map 72  1620 500 ml water bolus levaged into NG per Dr Mariia Goodman, ordered to hold suctin until at bedside. 1625 NS 1 Liter bolus infusing at 999 ml at this time  1630 Dr Mariia Goodman at bedside to suction levaged H20, clear drainage at this time 500 ml output suctioned  1630 1 unit prbc ordered per Dr Mariia Goodman. Consent singed and in chart  1635 300 suerkha/yello urine out castellanos   1635 spoke with [facility] from [address] to attempt tranfering orders. [address] at bedside. 1655 1 unit prbc infusing at this time. [address] from Endo called at this time to report they were on their way to perform EGD. Consent signed by Michael Guan  8618 blood infusing 999ml/ hr per Dr Comfort Adams egd completed. BP 90/38(49) HR 79 spo2 100% RR 12  1837 protonix off   1910 spoke with GI Dr Fidelina Booth- ordered 2 unots PRBC, 2 Platlets. Consent signed for IR.   1920 second NS 1000 ml bolus given   1915 pt to IR stat with 4 RN and Dr Irma Reina at bedside. Pt has prbc infusing. 2005 this RN back from IR, night RN in IR with pt.  Signing off at this time Clindamycin Pregnancy And Lactation Text: This medication can be used in pregnancy if certain situations. Clindamycin is also present in breast milk.

## 2024-11-20 ENCOUNTER — HOSPITAL ENCOUNTER (EMERGENCY)
Age: 54
Discharge: HOME | End: 2024-11-20
Attending: EMERGENCY MEDICINE
Payer: MEDICARE

## 2024-11-20 ENCOUNTER — APPOINTMENT (OUTPATIENT)
Dept: GENERAL RADIOLOGY | Age: 54
End: 2024-11-20
Payer: MEDICARE

## 2024-11-20 ENCOUNTER — APPOINTMENT (OUTPATIENT)
Dept: CT IMAGING | Age: 54
End: 2024-11-20
Payer: MEDICARE

## 2024-11-20 VITALS
BODY MASS INDEX: 29.8 KG/M2 | RESPIRATION RATE: 17 BRPM | WEIGHT: 220 LBS | DIASTOLIC BLOOD PRESSURE: 97 MMHG | HEIGHT: 72 IN | HEART RATE: 59 BPM | OXYGEN SATURATION: 96 % | TEMPERATURE: 97.8 F | SYSTOLIC BLOOD PRESSURE: 137 MMHG

## 2024-11-20 DIAGNOSIS — R07.9 CHEST PAIN, UNSPECIFIED TYPE: Primary | ICD-10-CM

## 2024-11-20 LAB
AMPHETAMINES UR QL SCN>1000 NG/ML: NORMAL
ANION GAP SERPL CALCULATED.3IONS-SCNC: 12 MMOL/L (ref 3–16)
BARBITURATES UR QL SCN>200 NG/ML: NORMAL
BASOPHILS # BLD: 0 K/UL (ref 0–0.2)
BASOPHILS NFR BLD: 0.4 %
BENZODIAZ UR QL SCN>200 NG/ML: NORMAL
BILIRUB UR QL STRIP.AUTO: NEGATIVE
BUN SERPL-MCNC: 12 MG/DL (ref 7–20)
CALCIUM SERPL-MCNC: 10.7 MG/DL (ref 8.3–10.6)
CANNABINOIDS UR QL SCN>50 NG/ML: NORMAL
CHLORIDE SERPL-SCNC: 98 MMOL/L (ref 99–110)
CLARITY UR: CLEAR
CO2 SERPL-SCNC: 27 MMOL/L (ref 21–32)
COCAINE UR QL SCN: NORMAL
COLOR UR: YELLOW
CREAT SERPL-MCNC: 1.3 MG/DL (ref 0.9–1.3)
DEPRECATED RDW RBC AUTO: 14.2 % (ref 12.4–15.4)
DRUG SCREEN COMMENT UR-IMP: NORMAL
EKG ATRIAL RATE: 61 BPM
EKG DIAGNOSIS: NORMAL
EKG P AXIS: 42 DEGREES
EKG P-R INTERVAL: 134 MS
EKG Q-T INTERVAL: 398 MS
EKG QRS DURATION: 92 MS
EKG QTC CALCULATION (BAZETT): 400 MS
EKG R AXIS: 16 DEGREES
EKG T AXIS: 64 DEGREES
EKG VENTRICULAR RATE: 61 BPM
EOSINOPHIL # BLD: 0.3 K/UL (ref 0–0.6)
EOSINOPHIL NFR BLD: 2.8 %
FENTANYL SCREEN, URINE: NORMAL
GFR SERPLBLD CREATININE-BSD FMLA CKD-EPI: 65 ML/MIN/{1.73_M2}
GLUCOSE BLD-MCNC: 302 MG/DL (ref 70–99)
GLUCOSE SERPL-MCNC: 382 MG/DL (ref 70–99)
GLUCOSE UR STRIP.AUTO-MCNC: >=1000 MG/DL
HCT VFR BLD AUTO: 44.9 % (ref 40.5–52.5)
HGB BLD-MCNC: 14.9 G/DL (ref 13.5–17.5)
HGB UR QL STRIP.AUTO: NEGATIVE
KETONES UR STRIP.AUTO-MCNC: NEGATIVE MG/DL
LEUKOCYTE ESTERASE UR QL STRIP.AUTO: NEGATIVE
LYMPHOCYTES # BLD: 2.5 K/UL (ref 1–5.1)
LYMPHOCYTES NFR BLD: 27.4 %
MCH RBC QN AUTO: 28.6 PG (ref 26–34)
MCHC RBC AUTO-ENTMCNC: 33.2 G/DL (ref 31–36)
MCV RBC AUTO: 86.1 FL (ref 80–100)
METHADONE UR QL SCN>300 NG/ML: NORMAL
MONOCYTES # BLD: 0.5 K/UL (ref 0–1.3)
MONOCYTES NFR BLD: 5.3 %
NEUTROPHILS # BLD: 5.8 K/UL (ref 1.7–7.7)
NEUTROPHILS NFR BLD: 64.1 %
NITRITE UR QL STRIP.AUTO: NEGATIVE
NT-PROBNP SERPL-MCNC: <36 PG/ML (ref 0–124)
OPIATES UR QL SCN>300 NG/ML: NORMAL
OXYCODONE UR QL SCN: NORMAL
PCP UR QL SCN>25 NG/ML: NORMAL
PERFORMED ON: ABNORMAL
PH UR STRIP.AUTO: 6.5 [PH] (ref 5–8)
PH UR STRIP: 6.5 [PH]
PLATELET # BLD AUTO: 257 K/UL (ref 135–450)
PMV BLD AUTO: 8.3 FL (ref 5–10.5)
POTASSIUM SERPL-SCNC: 4.3 MMOL/L (ref 3.5–5.1)
PROT UR STRIP.AUTO-MCNC: NEGATIVE MG/DL
RBC # BLD AUTO: 5.21 M/UL (ref 4.2–5.9)
SODIUM SERPL-SCNC: 137 MMOL/L (ref 136–145)
SP GR UR STRIP.AUTO: <=1.005 (ref 1–1.03)
TROPONIN, HIGH SENSITIVITY: 11 NG/L (ref 0–22)
TROPONIN, HIGH SENSITIVITY: 12 NG/L (ref 0–22)
UA COMPLETE W REFLEX CULTURE PNL UR: ABNORMAL
UA DIPSTICK W REFLEX MICRO PNL UR: ABNORMAL
URN SPEC COLLECT METH UR: ABNORMAL
UROBILINOGEN UR STRIP-ACNC: 0.2 E.U./DL
WBC # BLD AUTO: 9.1 K/UL (ref 4–11)

## 2024-11-20 PROCEDURE — 83880 ASSAY OF NATRIURETIC PEPTIDE: CPT

## 2024-11-20 PROCEDURE — 80307 DRUG TEST PRSMV CHEM ANLYZR: CPT

## 2024-11-20 PROCEDURE — 93005 ELECTROCARDIOGRAM TRACING: CPT | Performed by: EMERGENCY MEDICINE

## 2024-11-20 PROCEDURE — 71045 X-RAY EXAM CHEST 1 VIEW: CPT

## 2024-11-20 PROCEDURE — 81003 URINALYSIS AUTO W/O SCOPE: CPT

## 2024-11-20 PROCEDURE — 70450 CT HEAD/BRAIN W/O DYE: CPT

## 2024-11-20 PROCEDURE — 80048 BASIC METABOLIC PNL TOTAL CA: CPT

## 2024-11-20 PROCEDURE — 99285 EMERGENCY DEPT VISIT HI MDM: CPT

## 2024-11-20 PROCEDURE — 93010 ELECTROCARDIOGRAM REPORT: CPT | Performed by: INTERNAL MEDICINE

## 2024-11-20 PROCEDURE — 36415 COLL VENOUS BLD VENIPUNCTURE: CPT

## 2024-11-20 PROCEDURE — 85025 COMPLETE CBC W/AUTO DIFF WBC: CPT

## 2024-11-20 PROCEDURE — 84484 ASSAY OF TROPONIN QUANT: CPT

## 2024-11-20 ASSESSMENT — LIFESTYLE VARIABLES
HOW OFTEN DO YOU HAVE A DRINK CONTAINING ALCOHOL: NEVER
HOW MANY STANDARD DRINKS CONTAINING ALCOHOL DO YOU HAVE ON A TYPICAL DAY: PATIENT DOES NOT DRINK

## 2024-11-20 ASSESSMENT — PAIN SCALES - GENERAL: PAINLEVEL_OUTOF10: 10

## 2024-11-20 ASSESSMENT — PAIN - FUNCTIONAL ASSESSMENT: PAIN_FUNCTIONAL_ASSESSMENT: 0-10

## 2024-11-20 ASSESSMENT — PAIN DESCRIPTION - DESCRIPTORS: DESCRIPTORS: SHARP

## 2024-11-20 ASSESSMENT — PAIN DESCRIPTION - ORIENTATION: ORIENTATION: MID

## 2024-11-20 ASSESSMENT — PAIN DESCRIPTION - LOCATION: LOCATION: CHEST

## 2024-11-20 NOTE — ED PROVIDER NOTES
Rhythm 61  Axis is   Normal  QTc is  normal  There is no specific ST-T wave changes appreciated.  There is no clear evidence of acute ischemia or infarction.  It was compared against an EKG from 2019  New T wave inversions in V1, V2, V3 and slightly in V4 and V5 from 29.      RADIOLOGY:   Non-plain film images such as CT, Ultrasound and MRI are read by the radiologist. Plain radiographic images are visualized and preliminarily interpreted by the ED Provider with the below findings:        Interpretation per the Radiologist below, if available at the time of this note:    CT Head W/O Contrast   Final Result   No evidence of acute intracranial abnormality on a study mildly limited as   described above.         XR CHEST PORTABLE   Final Result   Slight vascular prominence may reflect early failure versus hypoinflation.   Recommend follow-up.           No results found.    No results found.    PROCEDURES   Unless otherwise noted below, none     Procedures    CRITICAL CARE TIME       EMERGENCY DEPARTMENT COURSE and DIFFERENTIAL DIAGNOSIS/MDM:   Vitals:    Vitals:    11/20/24 1524 11/20/24 1747   BP: (!) 168/106 (!) 137/97   Pulse: 61 59   Resp: 18 17   Temp: 97.8 °F (36.6 °C)    TempSrc: Oral    SpO2: 96% 96%   Weight: 99.8 kg (220 lb)    Height: 1.829 m (6')        Patient was given the following medications:  Medications - No data to display         Is this patient to be included in the SEP-1 Core Measure due to severe sepsis or septic shock?      PAST MEDICAL HISTORY      has a past medical history of Bipolar affective ([facility]), CAD (coronary artery disease) (04/2018), Depression, Diabetes mellitus ([facility]), Hashimoto disease, Hyperlipidemia, Hypertension, Lithium intoxication (06/30/2018), Schizophrenia ([facility]), and Sleep apnea.     CC/HPI Summary, DDx, ED Course, and Reassessment: Perry Garcia is a 54 y.o. male who presents to the emergency department with chest pain. He states it started this morning.  He states

## 2025-01-15 ENCOUNTER — PREP FOR PROCEDURE (OUTPATIENT)
Dept: SURGERY | Age: 55
End: 2025-01-15

## 2025-01-15 ENCOUNTER — INITIAL CONSULT (OUTPATIENT)
Dept: SURGERY | Age: 55
End: 2025-01-15
Payer: MEDICARE

## 2025-01-15 VITALS
DIASTOLIC BLOOD PRESSURE: 70 MMHG | BODY MASS INDEX: 30.48 KG/M2 | SYSTOLIC BLOOD PRESSURE: 110 MMHG | HEIGHT: 72 IN | WEIGHT: 225 LBS

## 2025-01-15 DIAGNOSIS — L72.3 SEBACEOUS CYST: ICD-10-CM

## 2025-01-15 DIAGNOSIS — L72.3 SEBACEOUS CYST: Primary | ICD-10-CM

## 2025-01-15 PROCEDURE — 99202 OFFICE O/P NEW SF 15 MIN: CPT | Performed by: SURGERY

## 2025-01-15 PROCEDURE — 3017F COLORECTAL CA SCREEN DOC REV: CPT | Performed by: SURGERY

## 2025-01-15 PROCEDURE — 3078F DIAST BP <80 MM HG: CPT | Performed by: SURGERY

## 2025-01-15 PROCEDURE — 4004F PT TOBACCO SCREEN RCVD TLK: CPT | Performed by: SURGERY

## 2025-01-15 PROCEDURE — G8417 CALC BMI ABV UP PARAM F/U: HCPCS | Performed by: SURGERY

## 2025-01-15 PROCEDURE — G8427 DOCREV CUR MEDS BY ELIG CLIN: HCPCS | Performed by: SURGERY

## 2025-01-15 PROCEDURE — 3074F SYST BP LT 130 MM HG: CPT | Performed by: SURGERY

## 2025-01-16 NOTE — PROGRESS NOTES
3. Aspirin, Ibuprofen, Advil, Naproxen, Vitamin E, fish oil and other Anti-inflammatory products should be stopped for 5 days before surgery or as directed by your physician.   4. Check with your Doctor regarding stopping Plavix, Coumadin, Lovenox, Fragmin or other blood thinners   5. Do not smoke, and do not drink any alcoholic beverages 24 hours prior to surgery.  This includes NA Beer.   6. You may brush your teeth and gargle the morning of surgery.  DO NOT SWALLOW WATER   7. You MUST make arrangements for a responsible adult to take you home after your surgery. You will not be allowed to leave alone or drive yourself home.  It is strongly suggested someone stay with you the first 24 hrs. Your surgery will be cancelled if you do not have a ride home.   8. A parent/legal guardian must accompany a child scheduled for surgery and plan to stay at the hospital until the child is discharged.  Please do not bring other children with you.   9. Please wear simple, loose fitting clothing to the hospital.  Do not bring valuables (money, credit cards, checkbooks, etc.) Do not wear any makeup (including no eye makeup) or nail polish on your fingers or toes.   10. DO NOT wear any jewelry or piercings on day of surgery.  All body piercing jewelry must be removed.   11. If you have dentures,glasses, or contacts they will be removed before going to the OR; we will provide you a container.    12. Please see your family doctor/and cardiologist for a history & physical and/or concerning medications.  Bring any test results/reports from your physician's office. Have history and labs faxed to [phone]    13. Remember to bring Blood Bank bracelet on the day of surgery.   14. If you have a Living Will and Durable Power of  for Healthcare, please bring in a copy.   15. Notify your Surgeon if you develop any illness between now and surgery  time, cough, cold, fever, sore throat,

## 2025-01-22 ENCOUNTER — HOSPITAL ENCOUNTER (OUTPATIENT)
Age: 55
Setting detail: OUTPATIENT SURGERY
Discharge: HOME | End: 2025-01-22
Attending: SURGERY | Admitting: SURGERY
Payer: MEDICARE

## 2025-01-22 VITALS
HEART RATE: 55 BPM | BODY MASS INDEX: 30.61 KG/M2 | OXYGEN SATURATION: 98 % | WEIGHT: 226 LBS | RESPIRATION RATE: 16 BRPM | HEIGHT: 72 IN | SYSTOLIC BLOOD PRESSURE: 121 MMHG | DIASTOLIC BLOOD PRESSURE: 70 MMHG | TEMPERATURE: 97.2 F

## 2025-01-22 DIAGNOSIS — L72.3 SEBACEOUS CYST: ICD-10-CM

## 2025-01-22 PROCEDURE — 11403 EXC TR-EXT B9+MARG 2.1-3CM: CPT | Performed by: SURGERY

## 2025-01-22 PROCEDURE — 7100000010 HC PHASE II RECOVERY - FIRST 15 MIN: Performed by: SURGERY

## 2025-01-22 PROCEDURE — 12032 INTMD RPR S/A/T/EXT 2.6-7.5: CPT | Performed by: SURGERY

## 2025-01-22 PROCEDURE — 3600000002 HC SURGERY LEVEL 2 BASE: Performed by: SURGERY

## 2025-01-22 PROCEDURE — 88304 TISSUE EXAM BY PATHOLOGIST: CPT

## 2025-01-22 PROCEDURE — 3600000012 HC SURGERY LEVEL 2 ADDTL 15MIN: Performed by: SURGERY

## 2025-01-22 PROCEDURE — 2500000003 HC RX 250 WO HCPCS: Performed by: SURGERY

## 2025-01-22 PROCEDURE — 2709999900 HC NON-CHARGEABLE SUPPLY: Performed by: SURGERY

## 2025-01-22 PROCEDURE — 6360000002 HC RX W HCPCS: Performed by: SURGERY

## 2025-01-22 PROCEDURE — 7100000011 HC PHASE II RECOVERY - ADDTL 15 MIN: Performed by: SURGERY

## 2025-01-22 RX ORDER — MAGNESIUM HYDROXIDE 1200 MG/15ML
LIQUID ORAL CONTINUOUS PRN
Status: COMPLETED | OUTPATIENT
Start: 2025-01-22 | End: 2025-01-22

## 2025-01-22 ASSESSMENT — PAIN SCALES - GENERAL
PAINLEVEL_OUTOF10: 0
PAINLEVEL_OUTOF10: 0

## 2025-01-22 ASSESSMENT — PAIN - FUNCTIONAL ASSESSMENT: PAIN_FUNCTIONAL_ASSESSMENT: NONE - DENIES PAIN

## 2025-01-22 NOTE — DISCHARGE INSTRUCTIONS
[facility]    Diego Arredondo M.D.   [facility] Office      [facility] Office        [facility]               [address]                [address]  Stevo Braxton M.D.              [address]           [address]          [place] [address]         [place] [address]  Smooth Maldonado M.D                         [phone]        [facility]                   Rodney Moreno M.D.          [facility]       POST-OPERATIVE INSTRUCTIONS FOLLOWING EXCISION OF A MASS    The office will call with biopsy results.  Call the office if you have not heard any results in 1 week.     You may remove your dressing tomorrow.  Leave the steri-stips in place. These will peel away in 7-10 days.     You may shower after you have removed the dressing.  Wash incision gently and pat dry. Do not rub your incision.       Watch for signs of infection:       Fever over 100.5°     Excessive warmth, or bright redness around your incision    Leakage of bloody or cloudy fluid from you incisions

## 2025-01-22 NOTE — PROGRESS NOTES
[facility]    HPI:  Patient is 55 y.o. year old male seen at request of Bradford Ramirez APRN - CNP.  He presents because of lump located on  mid back .  There has been pain at or near the lesion and a recent increase in size.  There is not  previous history of similar.  There has not been any biopsy.      Past Medical History:   Diagnosis Date    Bipolar affective (HCC)     CAD (coronary artery disease) 04/2018     of mLAD    Depression     Diabetes mellitus (HCC)     Hashimoto disease     Hyperlipidemia     Hypertension     Lithium intoxication 06/30/2018    required one time dialysis treatment for toxicity    Schizophrenia (HCC)     Sleep apnea        Past Surgical History:   Procedure Laterality Date    COLONOSCOPY N/A 01/07/2019    COLONOSCOPY performed by Adam Barnhart MD at [facility] ENDOSCOPY    CORONARY ANGIOPLASTY  05/29/2018    POBA of LAD    [place] CABG W/ARTERIAL GRAFT THREE ARTERIAL GRAFTS N/A 08/20/2018    CORONARY ARTERY BYPASS GRAFTING X3, INTERNAL MAMMARY ARTERY, SAPHENOUS VEIN GRAFT, ON PUMP performed by Rochelle Mathew MD at [facility] CVOR    UPPER GASTROINTESTINAL ENDOSCOPY N/A 10/03/2018    EGD DIAGNOSTIC ONLY performed by Bunny Bustillo MD at [facility] ENDOSCOPY       Current Outpatient Medications on File Prior to Visit   Medication Sig Dispense Refill    lisinopril (PRINIVIL;ZESTRIL) 10 MG tablet TAKE ONE TABLET BY MOUTH DAILY (Patient taking differently: 2 tablets) 90 tablet 3    metoprolol tartrate (LOPRESSOR) 50 MG tablet Take 0.5 tablets by mouth 2 times daily (Patient taking differently: Take 1 tablet by mouth 2 times daily) 60 tablet 11    aspirin 81 MG EC tablet Take 1 tablet by mouth daily 30 tablet 3    lamoTRIgine (LAMICTAL) 25 MG tablet Take 3 tablets PO daily.  Follow up with MD for further titrations. (Patient taking differently: Take 4 tablets by mouth daily) 15 tablet 0    alogliptin-metformin (KAZANO) 12.5-1000 MG TABS Take 1 capsule by mouth 2 times

## 2025-01-22 NOTE — BRIEF OP NOTE
Brief Postoperative Note      Patient: Perry Garcia  YOB: 1970  MRN: [id number]    Date of Procedure: 1/22/2025    Pre-Op Diagnosis Codes:      * Sebaceous cyst [L72.3]    Post-Op Diagnosis: Same       Procedure(s):  EXCISION CYST MID BACK    Surgeon(s):  Ignacio Scott MD    Assistant:  Surgical Assistant: Fide Keyes    Anesthesia: Local    Estimated Blood Loss (mL): Minimal    Complications: None    Specimens:   ID Type Source Tests Collected by Time Destination   1 :  Tissue   Ignacio Scott MD 1/22/2025 0920        Implants:  * No implants in log *      Drains: * No LDAs found *    Findings:  Infection Present At Time Of Surgery (PATOS) (choose all levels that have infection present):  No infection present  Other Findings: As above  This procedure was not performed to treat primary cutaneous melanoma through wide local excision    Electronically signed by IGNACIO SCOTT MD on 1/22/2025 at 10:24 AM

## 2025-01-22 NOTE — H&P
[facility] GENERAL SURGERY      The H&P was reviewed, the patient was examined, and no change has occurred in the patient's condition since the H&P was completed. The indications for the procedure were reviewed, and any questions were answered.     I updated the progress note from 1/15/2025 which is the H&P.

## 2025-01-26 NOTE — OP NOTE
[address] [address]                            OPERATIVE REPORT      PATIENT NAME: RYAN GOULD         : 1970  MED REC NO: [id number]                      ROOM: [facility]  ACCOUNT NO: [id number]                       ADMIT DATE: 2025  PROVIDER: Ignacio Scott MD      DATE OF PROCEDURE:  2025    SURGEON:  Ignacio Scott MD    OPERATION PERFORMED:  Excision of cystic mass mid back.    PREOPERATIVE DIAGNOSIS:  Cystic mass mid back.    POSTOPERATIVE DIAGNOSIS:  Cystic mass mid back.    ANESTHESIA:  Local.    COMPLICATIONS:  None.    ESTIMATED BLOOD LOSS:  Less than 50 mL.    INDICATION FOR THE OPERATION:  55-year-old male with a cystic mass in the mid back.  It was enlarging.  It was causing acute pain and skin sensation disturbance.  I recommended operative excision.  The risks and benefits were explained.  The patient understood them, accepted them, and elected to proceed.    DESCRIPTION OF OPERATION:  Patient was brought to the operating room.  He was placed in the prone position.  He was prepped and draped in usual surgical sterile fashion.  Elliptical incision was made around the cystic mass in the mid back after the infiltration of local anesthetic.  The excised diameter was 2.5 cm.  The incision length was 4.5 cm.  Full-thickness excision outside the cyst was performed.  Hemostasis was obtained.  Intermediate type closure was performed by closing both the superficial fascia and subcutaneous tissue with 3-0 Vicryl suture. 4-0 Vicryl was used to reapproximate the skin.  Benzoin, Steri-Strip bandages were placed.    DISPOSITION:  The patient tolerated the procedure without any acute complication.          IGNACIO SCOTT MD      D:  2025 12:51:30     T:  2025 19:59:04     MTP/AQS  Job #:  [id number]     Doc#:  [id number]    CC:   Bradford Ramirez CNP

## 2025-01-27 ENCOUNTER — TELEPHONE (OUTPATIENT)
Dept: SURGERY | Age: 55
End: 2025-01-27

## 2025-01-27 NOTE — TELEPHONE ENCOUNTER
----- Message from Dr. Rodney Moreno MD sent at 1/24/2025 10:10 AM EST -----  Tell the patient that the lump removed was a benign cyst.  Follow up prn.

## 2025-01-30 NOTE — TELEPHONE ENCOUNTER
I will mail letter to patient asking him to call back for results due to no response from left messages.

## 2025-03-01 ENCOUNTER — HOSPITAL ENCOUNTER (OUTPATIENT)
Age: 55
Discharge: HOME | End: 2025-03-01
Attending: INTERNAL MEDICINE
Payer: MEDICARE

## 2025-03-01 ENCOUNTER — HOSPITAL ENCOUNTER (OUTPATIENT)
Dept: ULTRASOUND IMAGING | Age: 55
Discharge: HOME | End: 2025-03-01
Attending: INTERNAL MEDICINE
Payer: MEDICARE

## 2025-03-01 DIAGNOSIS — N18.31 STAGE 3A CHRONIC KIDNEY DISEASE (HCC): ICD-10-CM

## 2025-03-01 LAB
ALBUMIN SERPL-MCNC: 4.2 G/DL (ref 3.4–5)
ANION GAP SERPL CALCULATED.3IONS-SCNC: 12 MMOL/L (ref 3–16)
BUN SERPL-MCNC: 18 MG/DL (ref 7–20)
CALCIUM SERPL-MCNC: 10 MG/DL (ref 8.3–10.6)
CHLORIDE SERPL-SCNC: 104 MMOL/L (ref 99–110)
CO2 SERPL-SCNC: 25 MMOL/L (ref 21–32)
CREAT SERPL-MCNC: 1.1 MG/DL (ref 0.9–1.3)
DEPRECATED RDW RBC AUTO: 13.8 % (ref 12.4–15.4)
GFR SERPLBLD CREATININE-BSD FMLA CKD-EPI: 80 ML/MIN/{1.73_M2}
GLUCOSE SERPL-MCNC: 215 MG/DL (ref 70–99)
HCT VFR BLD AUTO: 42.4 % (ref 40.5–52.5)
HGB BLD-MCNC: 14 G/DL (ref 13.5–17.5)
MAGNESIUM SERPL-MCNC: 1.6 MG/DL (ref 1.8–2.4)
MCH RBC QN AUTO: 28.9 PG (ref 26–34)
MCHC RBC AUTO-ENTMCNC: 32.9 G/DL (ref 31–36)
MCV RBC AUTO: 87.7 FL (ref 80–100)
PHOSPHATE SERPL-MCNC: 3.1 MG/DL (ref 2.5–4.9)
PLATELET # BLD AUTO: 211 K/UL (ref 135–450)
PMV BLD AUTO: 8.1 FL (ref 5–10.5)
POTASSIUM SERPL-SCNC: 4.6 MMOL/L (ref 3.5–5.1)
RBC # BLD AUTO: 4.83 M/UL (ref 4.2–5.9)
RPT COMMENT: NORMAL
SODIUM SERPL-SCNC: 141 MMOL/L (ref 136–145)
URATE SERPL-MCNC: 5.6 MG/DL (ref 3.5–7.2)
WBC # BLD AUTO: 8.6 K/UL (ref 4–11)

## 2025-03-01 PROCEDURE — 80069 RENAL FUNCTION PANEL: CPT

## 2025-03-01 PROCEDURE — 36415 COLL VENOUS BLD VENIPUNCTURE: CPT

## 2025-03-01 PROCEDURE — 76770 US EXAM ABDO BACK WALL COMP: CPT

## 2025-03-01 PROCEDURE — 84156 ASSAY OF PROTEIN URINE: CPT

## 2025-03-01 PROCEDURE — 83735 ASSAY OF MAGNESIUM: CPT

## 2025-03-01 PROCEDURE — 83970 ASSAY OF PARATHORMONE: CPT

## 2025-03-01 PROCEDURE — 84550 ASSAY OF BLOOD/URIC ACID: CPT

## 2025-03-01 PROCEDURE — 84155 ASSAY OF PROTEIN SERUM: CPT

## 2025-03-01 PROCEDURE — 82306 VITAMIN D 25 HYDROXY: CPT

## 2025-03-01 PROCEDURE — 83521 IG LIGHT CHAINS FREE EACH: CPT

## 2025-03-01 PROCEDURE — 82570 ASSAY OF URINE CREATININE: CPT

## 2025-03-01 PROCEDURE — 85027 COMPLETE CBC AUTOMATED: CPT

## 2025-03-01 PROCEDURE — 84165 PROTEIN E-PHORESIS SERUM: CPT

## 2025-03-02 LAB
25(OH)D3 SERPL-MCNC: 8.4 NG/ML
CREAT UR-MCNC: 207 MG/DL (ref 39–259)
PROT SERPL-MCNC: 6.9 G/DL (ref 6.4–8.2)
PROT UR-MCNC: 12.9 MG/DL
PROT/CREAT UR-RTO: 0.1 MG/DL
PTH-INTACT SERPL-MCNC: 24.7 PG/ML (ref 14–72)

## 2025-03-03 LAB
ALBUMIN SERPL ELPH-MCNC: 3.3 G/DL (ref 3.1–4.9)
ALPHA1 GLOB SERPL ELPH-MCNC: 0.3 G/DL (ref 0.2–0.4)
ALPHA2 GLOB SERPL ELPH-MCNC: 1 G/DL (ref 0.4–1.1)
B-GLOBULIN SERPL ELPH-MCNC: 1.3 G/DL (ref 0.9–1.6)
GAMMA GLOB SERPL ELPH-MCNC: 1 G/DL (ref 0.6–1.8)
PROT SERPL-MCNC: 6.9 G/DL (ref 6.4–8.2)
SPE/IFE INTERPRETATION: NORMAL

## 2025-03-04 LAB
KAPPA LC FREE SER-MCNC: 25.1 MG/L (ref 2.37–20.73)
KAPPA LC FREE/LAMBDA FREE SER: 1.24 {RATIO} (ref 0.22–1.74)
LAMBDA LC FREE SERPL-MCNC: 20.2 MG/L (ref 4.23–27.69)
RPT COMMENT: ABNORMAL

## (undated) DEVICE — MHCZ MINOR: Brand: MEDLINE INDUSTRIES, INC.

## (undated) DEVICE — 3M™ TEGADERM™ TRANSPARENT FILM DRESSING FRAME STYLE, 1626W, 4 IN X 4-3/4 IN (10 CM X 12 CM), 50/CT 4CT/CASE: Brand: 3M™ TEGADERM™

## (undated) DEVICE — GOWN,AURORA,NONREINF,RAGLAN,XXL,STERILE: Brand: MEDLINE

## (undated) DEVICE — BW-412T DISP COMBO CLEANING BRUSH: Brand: SINGLE USE COMBINATION CLEANING BRUSH

## (undated) DEVICE — SUTURE ETHBND EXCEL SZ 2-0 L36IN NONABSORBABLE GRN SH-2 X559H

## (undated) DEVICE — KIT BLWR MISTER 5P 15L W/ TBNG SET IRRIG MIST TO IMPROVE

## (undated) DEVICE — CANNULA PERF 7FR L5.5IN AORT ROOT RADPQ STD TIP W/ VENT LN

## (undated) DEVICE — SUTURE PROL SZ 6-0 L24IN NONABSORBABLE BLU L13MM C-1 3/8 8726H

## (undated) DEVICE — Device

## (undated) DEVICE — SUTURE MCRYL + SZ 4-0 L18IN ABSRB UD L19MM PS-2 3/8 CIR MCP496G

## (undated) DEVICE — CATHETER THORACENTHESIS 9 FRX20 IN EYES TOP

## (undated) DEVICE — PACK PROCEDURE SURG OPN HRT A BASIC

## (undated) DEVICE — SOLUTION IV IRRIG WATER 500ML POUR BRL ST 2F7113

## (undated) DEVICE — SUTURE PDS II SZ 0 L36IN ABSRB VLT L36MM CT-1 1/2 CIR Z346H

## (undated) DEVICE — ADHESIVE SKIN CLSR 0.7ML TOP DERMBND ADV

## (undated) DEVICE — SUTURE NONABSORBABLE MONOFILAMENT 4-0 RB-1 36 IN BLU PROLENE 8557H

## (undated) DEVICE — 3M™ TEGADERM™ TRANSPARENT FILM DRESSING FRAME STYLE, 1624W, 2-3/8 IN X 2-3/4 IN (6 CM X 7 CM), 100/CT 4CT/CASE: Brand: 3M™ TEGADERM™

## (undated) DEVICE — SUTURE VCRL SZ 3-0 L27IN ABSRB UD L26MM SH 1/2 CIR J416H

## (undated) DEVICE — GLOVE ORANGE PI 8 1/2   MSG9085

## (undated) DEVICE — PUNCH AORT DIA4MM LNG HNDL

## (undated) DEVICE — GOWN SIRUS NONREIN XL W/TWL: Brand: MEDLINE INDUSTRIES, INC.

## (undated) DEVICE — WAX SURG 2.5GM HEMSTAT BNE BEESWAX PARAFFIN ISO PALMITATE

## (undated) DEVICE — CLIP SM RED INTERN HMOCLP TITAN LIGATING

## (undated) DEVICE — CATHETER THOR L21IN DIA28FR R ANG SFT RADPQ STRP SIL

## (undated) DEVICE — Z INACTIVE USE 2641838 CLIP INT L ORNG TI TRNSVRS GRV CHEVRON SHP W/ PRECIS TIP TO

## (undated) DEVICE — TIP SUCT DIA12FR W STYL CTRL VENT DISPOSABLE FRAZ

## (undated) DEVICE — MOUTHPIECE ENDOSCP L CTRL OPN AND SIDE PORTS DISP

## (undated) DEVICE — AGENT HEMSTAT W3XL4IN OXIDIZED REGENERATED CELOS ABSRB FOR

## (undated) DEVICE — Z DISCONTINUED USE 2516375 APPLICATOR MEDICATED 3 CC CLR STRL CHLORAPREP

## (undated) DEVICE — Z TEMPORARILY DISCONTINUED NO SUB IDED DRAIN SURG BLD RECVRY PT TB FOR ATS BG OASIS

## (undated) DEVICE — SET VLV 3 PC AWS DISPOSABLE GRDIAN SCOPEVALET

## (undated) DEVICE — CANNULA PERF L15IN DIA29FR VEN 3 STG THN WALL DSGN W  VENT

## (undated) DEVICE — MEDI-VAC NON-CONDUCTIVE SUCTION TUBING: Brand: CARDINAL HEALTH

## (undated) DEVICE — SUTURE NONABSORBABLE MONOFILAMENT 7-0 BV-1 1X24 IN PROLENE 8702H

## (undated) DEVICE — 3M™ STERI-STRIP™ REINFORCED ADHESIVE SKIN CLOSURES, R1549, 1/2 IN X 2 IN (12 MM X 50 MM), 6 STRIPS/ENVELOPE: Brand: 3M™ STERI-STRIP™

## (undated) DEVICE — STERILE LATEX POWDER-FREE SURGICAL GLOVESWITH NITRILE COATING: Brand: PROTEXIS

## (undated) DEVICE — BLADE SAW W10XL54MM FOR PRI REPEAT STRNOTMY

## (undated) DEVICE — COVER LT HNDL PLAS RIG 2 PER PK

## (undated) DEVICE — CANNULA PERF AD 20FR L8.5IN ART 3/8IN CONN NVENT MTL TIP

## (undated) DEVICE — PROCEDURE KIT ENDOSCP CUST

## (undated) DEVICE — SUTURE SZ 7 L18IN NONABSORBABLE SIL CCS L48MM 1/2 CIR STRNM M655G

## (undated) DEVICE — SUTURE ETHBND EXCEL SZ 0 L18IN NONABSORBABLE GRN L36MM CT-1 CX21D

## (undated) DEVICE — CHLORAPREP 26ML ORANGE